# Patient Record
Sex: MALE | Race: WHITE | NOT HISPANIC OR LATINO | Employment: UNEMPLOYED | URBAN - METROPOLITAN AREA
[De-identification: names, ages, dates, MRNs, and addresses within clinical notes are randomized per-mention and may not be internally consistent; named-entity substitution may affect disease eponyms.]

---

## 2018-10-01 ENCOUNTER — HOSPITAL ENCOUNTER (EMERGENCY)
Facility: HOSPITAL | Age: 43
Discharge: HOME | End: 2018-10-01
Attending: FAMILY MEDICINE | Admitting: FAMILY MEDICINE
Payer: COMMERCIAL

## 2018-10-01 VITALS
RESPIRATION RATE: 20 BRPM | HEIGHT: 65 IN | HEART RATE: 93 BPM | TEMPERATURE: 96.9 F | WEIGHT: 140 LBS | DIASTOLIC BLOOD PRESSURE: 97 MMHG | OXYGEN SATURATION: 97 % | SYSTOLIC BLOOD PRESSURE: 143 MMHG | BODY MASS INDEX: 23.32 KG/M2

## 2018-10-01 DIAGNOSIS — E87.6 HYPOKALEMIA: ICD-10-CM

## 2018-10-01 DIAGNOSIS — R73.9 HYPERGLYCEMIA: Primary | ICD-10-CM

## 2018-10-01 LAB
ANION GAP SERPL CALCULATED.3IONS-SCNC: 7 MMOL/L (ref 4–13)
BASOPHILS # BLD AUTO: 0 THOUSANDS/ΜL (ref 0–0.1)
BASOPHILS NFR BLD AUTO: 1 % (ref 0–2)
BUN SERPL-MCNC: 11 MG/DL (ref 7–25)
CALCIUM SERPL-MCNC: 9.2 MG/DL (ref 8.6–10.5)
CHLORIDE SERPL-SCNC: 100 MMOL/L (ref 98–107)
CO2 SERPL-SCNC: 28 MMOL/L (ref 21–31)
CREAT SERPL-MCNC: 0.77 MG/DL (ref 0.7–1.3)
EOSINOPHIL # BLD AUTO: 0.1 THOUSAND/ΜL (ref 0–0.61)
EOSINOPHIL NFR BLD AUTO: 1 % (ref 0–5)
ERYTHROCYTE [DISTWIDTH] IN BLOOD BY AUTOMATED COUNT: 12.3 % (ref 11.5–14.5)
GFR SERPL CREATININE-BSD FRML MDRD: 112 ML/MIN/1.73SQ M
GLUCOSE SERPL-MCNC: 269 MG/DL (ref 65–140)
GLUCOSE SERPL-MCNC: 324 MG/DL (ref 65–140)
GLUCOSE SERPL-MCNC: 349 MG/DL (ref 65–99)
GLUCOSE SERPL-MCNC: >500 MG/DL (ref 65–140)
HCT VFR BLD AUTO: 45.1 % (ref 36.5–49.3)
HGB BLD-MCNC: 15.6 G/DL (ref 14–18)
LYMPHOCYTES # BLD AUTO: 2.6 THOUSANDS/ΜL (ref 0.6–4.47)
LYMPHOCYTES NFR BLD AUTO: 26 % (ref 21–51)
MCH RBC QN AUTO: 30.3 PG (ref 26–34)
MCHC RBC AUTO-ENTMCNC: 34.6 G/DL (ref 31–37)
MCV RBC AUTO: 87 FL (ref 81–99)
MONOCYTES # BLD AUTO: 1 THOUSAND/ΜL (ref 0.17–1.22)
MONOCYTES NFR BLD AUTO: 11 % (ref 2–12)
NEUTROPHILS # BLD AUTO: 6 THOUSANDS/ΜL (ref 1.4–6.5)
NEUTS SEG NFR BLD AUTO: 62 % (ref 42–75)
NRBC BLD AUTO-RTO: 0 /100 WBCS
PLATELET # BLD AUTO: 220 THOUSANDS/UL (ref 149–390)
PMV BLD AUTO: 8.6 FL (ref 8.6–11.7)
POTASSIUM SERPL-SCNC: 3.3 MMOL/L (ref 3.5–5.5)
RBC # BLD AUTO: 5.16 MILLION/UL (ref 4.3–5.9)
SODIUM SERPL-SCNC: 135 MMOL/L (ref 134–143)
WBC # BLD AUTO: 9.7 THOUSAND/UL (ref 4.8–10.8)

## 2018-10-01 PROCEDURE — 85025 COMPLETE CBC W/AUTO DIFF WBC: CPT | Performed by: FAMILY MEDICINE

## 2018-10-01 PROCEDURE — 96376 TX/PRO/DX INJ SAME DRUG ADON: CPT

## 2018-10-01 PROCEDURE — 36415 COLL VENOUS BLD VENIPUNCTURE: CPT | Performed by: FAMILY MEDICINE

## 2018-10-01 PROCEDURE — 99284 EMERGENCY DEPT VISIT MOD MDM: CPT

## 2018-10-01 PROCEDURE — 96374 THER/PROPH/DIAG INJ IV PUSH: CPT

## 2018-10-01 PROCEDURE — 80048 BASIC METABOLIC PNL TOTAL CA: CPT | Performed by: FAMILY MEDICINE

## 2018-10-01 PROCEDURE — 96361 HYDRATE IV INFUSION ADD-ON: CPT

## 2018-10-01 PROCEDURE — 82948 REAGENT STRIP/BLOOD GLUCOSE: CPT

## 2018-10-01 RX ORDER — GABAPENTIN 100 MG/1
100 CAPSULE ORAL 3 TIMES DAILY
COMMUNITY
End: 2021-03-09 | Stop reason: HOSPADM

## 2018-10-01 RX ORDER — POTASSIUM CHLORIDE 20 MEQ/1
40 TABLET, EXTENDED RELEASE ORAL ONCE
Status: COMPLETED | OUTPATIENT
Start: 2018-10-01 | End: 2018-10-01

## 2018-10-01 RX ADMIN — SODIUM CHLORIDE 1000 ML: 0.9 INJECTION, SOLUTION INTRAVENOUS at 13:18

## 2018-10-01 RX ADMIN — INSULIN HUMAN 5 UNITS: 100 INJECTION, SOLUTION PARENTERAL at 13:24

## 2018-10-01 RX ADMIN — INSULIN HUMAN 15 UNITS: 100 INJECTION, SOLUTION PARENTERAL at 12:03

## 2018-10-01 RX ADMIN — SODIUM CHLORIDE 1000 ML: 0.9 INJECTION, SOLUTION INTRAVENOUS at 12:03

## 2018-10-01 RX ADMIN — POTASSIUM CHLORIDE 40 MEQ: 1500 TABLET, EXTENDED RELEASE ORAL at 13:30

## 2018-10-01 NOTE — ED PROVIDER NOTES
History  Chief Complaint   Patient presents with    Dizziness     blood sugar >500       History provided by:  Patient and EMS personnel   used: No    Hyperglycemia - no symptoms   Severity:  Moderate  Onset quality:  Gradual  Duration:  1 week  Timing:  Constant  Progression:  Worsening  Chronicity:  Recurrent  Diabetes status:  Controlled with oral medications  Current diabetic therapy:  Metformin  Time since last antidiabetic medication:  2 hours  Context: not change in medication, not new diabetes diagnosis, not noncompliance, not recent change in diet and not recent illness    Relieved by:  Oral agents  Ineffective treatments:  Oral agents  Associated symptoms: dehydration and increased thirst    Associated symptoms: no abdominal pain, no blurred vision, no confusion, no dizziness, no fever, no increased appetite, no nausea, no vomiting and no weakness        Prior to Admission Medications   Prescriptions Last Dose Informant Patient Reported? Taking?   gabapentin (NEURONTIN) 100 mg capsule   Yes Yes   Sig: Take 100 mg by mouth 3 (three) times a day   metFORMIN (GLUCOPHAGE) 1000 MG tablet   Yes Yes   Sig: Take 1,000 mg by mouth 2 (two) times a day with meals      Facility-Administered Medications: None       Past Medical History:   Diagnosis Date    [facility]     type 2    Hypertension     Psychiatric disorder     depression       History reviewed  No pertinent surgical history  History reviewed  No pertinent family history  I have reviewed and agree with the history as documented  Social History   Substance Use Topics    Smoking status: Current Every Day Smoker     Packs/day: 0 50     Types: Cigarettes    Smokeless tobacco: Never Used    Alcohol use Yes      Comment: occasionally        Review of Systems   Constitutional: Negative for fever  HENT: Negative  Eyes: Negative for blurred vision  Respiratory: Negative      Gastrointestinal: Negative for abdominal pain, nausea and vomiting  Endocrine: Positive for polydipsia  Hyperglycemia   Genitourinary: Negative  Musculoskeletal: Negative  Neurological: Negative for dizziness and weakness  Psychiatric/Behavioral: Negative for confusion  Physical Exam  Physical Exam   Constitutional: He is oriented to person, place, and time  He appears well-developed and well-nourished  HENT:   Nose: Nose normal    Mouth/Throat: Oropharynx is clear and moist  No oropharyngeal exudate  Eyes: Pupils are equal, round, and reactive to light  Conjunctivae and EOM are normal  No scleral icterus  Neck: Normal range of motion  Neck supple  No JVD present  No tracheal deviation present  Cardiovascular: Normal rate, regular rhythm and normal heart sounds  No murmur heard  Pulmonary/Chest: Effort normal and breath sounds normal  No respiratory distress  He has no wheezes  He has no rales  Abdominal: Soft  Bowel sounds are normal  There is no tenderness  There is no guarding  Musculoskeletal: Normal range of motion  He exhibits no edema or tenderness  Neurological: He is alert and oriented to person, place, and time  No cranial nerve deficit or sensory deficit  He exhibits normal muscle tone  5/5 motor, nl sens   Skin: Skin is warm and dry  Psychiatric: He has a normal mood and affect  His behavior is normal    Nursing note and vitals reviewed        Vital Signs  ED Triage Vitals [10/01/18 1158]   Temperature Pulse Respirations Blood Pressure SpO2   98 °F (36 7 °C) (!) 113 20 147/97 97 %      Temp Source Heart Rate Source Patient Position - Orthostatic VS BP Location FiO2 (%)   Temporal Monitor Sitting Left arm --      Pain Score       No Pain           Vitals:    10/01/18 1158 10/01/18 1215   BP: 147/97    Pulse: (!) 113 102   Patient Position - Orthostatic VS: Sitting        Visual Acuity  Visual Acuity      Most Recent Value   L Pupil Size (mm)  3   R Pupil Size (mm)  3          ED Medications  Medications   sodium chloride 0 9 % bolus 1,000 mL (1,000 mL Intravenous New Bag 10/1/18 1318)   insulin regular (HumuLIN R,NovoLIN R) injection 15 Units (15 Units Intravenous Given 10/1/18 1203)   sodium chloride 0 9 % bolus 1,000 mL (0 mL Intravenous Stopped 10/1/18 1257)   insulin regular (HumuLIN R,NovoLIN R) injection 5 Units (5 Units Intravenous Given 10/1/18 1324)   potassium chloride (K-DUR,KLOR-CON) CR tablet 40 mEq (40 mEq Oral Given 10/1/18 1330)       Diagnostic Studies  Results Reviewed     Procedure Component Value Units Date/Time    Fingerstick Glucose (POCT) [[id number]]  (Abnormal) Collected:  10/01/18 1358    Lab Status:  Final result Updated:  10/01/18 1359     POC Glucose 269 (H) mg/dl     Basic metabolic panel [[id number]]  (Abnormal) Collected:  10/01/18 1253    Lab Status:  Final result Specimen:  Blood from Arm, Left Updated:  10/01/18 1317     Sodium 135 mmol/L      Potassium 3 3 (L) mmol/L      Chloride 100 mmol/L      CO2 28 mmol/L      ANION GAP 7 mmol/L      BUN 11 mg/dL      Creatinine 0 77 mg/dL      Glucose 349 (H) mg/dL      Calcium 9 2 mg/dL      eGFR 112 ml/min/1 73sq m     Narrative:       [address] [facility] Kidney Disease Education Program recommendations are as follows:  GFR calculation is accurate only with a steady state creatinine  Chronic Kidney disease less than 60 ml/min/1 73 sq  meters  Kidney failure less than 15 ml/min/1 73 sq  meters      CBC and differential [[id number]] Collected:  10/01/18 1253    Lab Status:  Final result Specimen:  Blood from Arm, Left Updated:  10/01/18 1316     WBC 9 70 Thousand/uL      RBC 5 16 Million/uL      Hemoglobin 15 6 g/dL      Hematocrit 45 1 %      MCV 87 fL      MCH 30 3 pg      MCHC 34 6 g/dL      RDW 12 3 %      MPV 8 6 fL      Platelets 119 Thousands/uL      nRBC 0 /100 WBCs      Neutrophils Relative 62 %      Lymphocytes Relative 26 %      Monocytes Relative 11 %      Eosinophils Relative 1 %      Basophils Relative 1 %      Neutrophils Absolute 6 00 Thousands/µL      Lymphocytes Absolute 2 60 Thousands/µL      Monocytes Absolute 1 00 Thousand/µL      Eosinophils Absolute 0 10 Thousand/µL      Basophils Absolute 0 00 Thousands/µL     Fingerstick Glucose (POCT) [[id number]]  (Abnormal) Collected:  10/01/18 1247    Lab Status:  Final result Updated:  10/01/18 1248     POC Glucose 324 (H) mg/dl     Fingerstick Glucose (POCT) [[id number]]  (Abnormal) Collected:  10/01/18 1152    Lab Status:  Final result Updated:  10/01/18 1153     POC Glucose >500 (HH) mg/dl                  No orders to display              Procedures  Procedures       Phone Contacts  ED Phone Contact    ED Course      patient advised to follow up with his PCP treat just his diabetic medication  Increase fluid intake and watch your diet and exercise  MDM  CritCare Time    Disposition  Final diagnoses:   Hyperglycemia   Hypokalemia     Time reflects when diagnosis was documented in both MDM as applicable and the Disposition within this note     Time User Action Codes Description Comment    10/1/2018  1:54 PM Jaiden Garcia [R73 9] Hyperglycemia     10/1/2018  1:54 PM Jaiden Garcia [E87 6] Hypokalemia       ED Disposition     ED Disposition Condition Comment    Discharge  Peter Arellano discharge to home/self care  Condition at discharge: Stable        Follow-up Information     Follow up With Specialties Details Why [address], DO Family Medicine In 2 days If symptoms worsen [address] [facility]            Patient's Medications   Discharge Prescriptions    No medications on file     No discharge procedures on file      ED Provider  Electronically Signed by           Elsy Jaime MD  10/01/18 8086

## 2018-10-01 NOTE — ED TRIAGE NOTES
Pt states that he has had dizziness, tingling in distal extremities, lightheadedness, Blood glucose level at home was 530, EMS was 549

## 2018-10-01 NOTE — DISCHARGE INSTRUCTIONS
Hypokalemia   WHAT YOU NEED TO KNOW:   Hypokalemia is a low level of potassium in your blood  Potassium helps control how your muscles, heart, and digestive system work  Hypokalemia occurs when your body loses too much potassium or does not absorb enough from food  DISCHARGE INSTRUCTIONS:   Seek care immediately if:   · You cannot move your arm or leg  · You have a fast or irregular heartbeat  · You are too tired or weak to stand up  Contact your healthcare provider if:   · You are vomiting, or you have diarrhea  · You have numbness or tingling in your arms or legs  · Your symptoms do not go away or they get worse  · You have questions or concerns about your condition or care  Medicines:   · Potassium  will be given to bring your potassium levels back to normal     · Take your medicine as directed  Contact your healthcare provider if you think your medicine is not helping or if you have side effects  Tell him of her if you are allergic to any medicine  Keep a list of the medicines, vitamins, and herbs you take  Include the amounts, and when and why you take them  Bring the list or the pill bottles to follow-up visits  Carry your medicine list with you in case of an emergency  Eat foods that are high in potassium:  Foods that are high in potassium include bananas, oranges, tomatoes, potatoes, and avocado  [place] beans, turkey, salmon, lean beef, yogurt, and milk are also high in potassium  Ask your healthcare provider or dietitian for more information about foods that are high in potassium  Follow up with your healthcare provider as directed:  Write down your questions so you remember to ask them during your visits  © 2017 [facility] Information is for End User's use only and may not be sold, redistributed or otherwise used for commercial purposes   All illustrations and images included in CareNotes® are the copyrighted property of A D A M , Inc  or [facility] [employer]  The above information is an  only  It is not intended as medical advice for individual conditions or treatments  Talk to your doctor, nurse or pharmacist before following any medical regimen to see if it is safe and effective for you  Hyperosmolar Hyperglycemic State   WHAT YOU NEED TO KNOW:   Hyperosmolar hyperglycemic state (HHS) is a serious medical condition that develops if you have diabetes and your blood sugar levels get very high  Your body gets rid of the extra sugar through your urine  This leads to severe dehydration  You can develop HHS at any age and whether you have type 1 or type 2 diabetes  DISCHARGE INSTRUCTIONS:   Medicines:   · Insulin:  You may need to take insulin if you have type 2 diabetes that cannot be controlled with diet, exercise, or other diabetes medicine  You may need 1 or more doses of insulin every day to decrease the amount of sugar in your blood  Ask your healthcare provider for more information about insulin  · Take your medicine as directed  Contact your healthcare provider if you think your medicine is not helping or if you have side effects  Tell him of her if you are allergic to any medicine  Keep a list of the medicines, vitamins, and herbs you take  Include the amounts, and when and why you take them  Bring the list or the pill bottles to follow-up visits  Carry your medicine list with you in case of an emergency  Follow up with your diabetes specialist or healthcare provider as directed:  Write down your questions so you remember to ask them during your visits  Prevent HHS:   · Check your blood sugar level regularly: You will need to check your blood sugar levels at least 3 times each day if you use an insulin pump or take multiple doses of insulin  Ask your healthcare provider for information on how to check your blood sugar level with a glucose meter   He will tell you what your target level should be and how often you should check            · Take your insulin or diabetes medicine: Take your insulin or diabetes medicines regularly and in the amount prescribed by your healthcare provider  This will help you to control your blood sugar levels  Tell your healthcare provider if the medicines are causing side effects or are not working well  Do not stop taking your insulin or medicines before talking to your healthcare provider  · Get help from others:  Older people are at increased risk of HHS  Have someone visit you regularly if you live alone  The visitor should watch for signs and symptoms of high blood sugar  The visitor should also remind you to drink enough liquids  It may be helpful to write down the amount of liquids you drink each day  · Prepare for sick days: Your blood sugar levels increase when you are sick and it can be hard to keep them under control  It is important to plan for sick days so that you can keep your blood sugar levels from getting too high  Talk to your healthcare provider about a sick day plan that will work best for you  Your healthcare provider may suggest some of the following:    ¨ Check your blood sugar more often than usual:  You may need to check your blood sugar level at least 4 times each day if you have type 2 diabetes  You may need to check even more often if you have type 1 diabetes  ¨ Check for ketones: You can check for ketones in your urine or blood at home  Ketone test kits are sold in pharmacies and some stores  Ask your healthcare provider which type of ketone testing is best for you  Your healthcare provider will tell you when and how often to check ketones  ¨ Take your insulin or diabetes medicine as directed: Take your insulin and diabetes medicine, even if you do not feel well and are eating less than usual  They help to keep your blood sugar under control   Talk to your healthcare provider before you make any changes to your dose of insulin or diabetes medicine  ¨ Continue your normal meal plan if you can:  Eat your regular meals and drink plenty of calorie-free drinks such as water and diet drinks  If you cannot continue your meal plan, eat other foods that are easier for your body to digest  These foods include apple sauce, gelatin, crackers, soup, pudding, and yogurt  If you cannot eat these foods, drink liquids with calories in them instead  Some liquids that have calories include juice, broth, and regular soft drinks  Medical alert identification:  Wear medical alert jewelry or carry a card that says you have diabetes  Ask your healthcare provider where you can get these items  For support and more information:   · American Diabetes Association  [address] , [address] [place] [address]  Phone: 7- 762 - 677-5045  Web Address: https://[employer]/  org   Contact your healthcare provider if:   · Your blood sugar levels are higher than you were told they should be  · You have blurred vision  · You are urinating more often than usual     · You are more thirsty than usual     · You have a fever  Return to the emergency department if:   · You are more drowsy than usual     · You begin to breathe fast or are short of breath  · You become weak and confused  · You have a seizure  © 2017 [address] [employer]  Information is for End User's use only and may not be sold, redistributed or otherwise used for commercial purposes  All illustrations and images included in CareNotes® are the copyrighted property of A D A M , Inc  or [employer]  The above information is an  only  It is not intended as medical advice for individual conditions or treatments  Talk to your doctor, nurse or pharmacist before following any medical regimen to see if it is safe and effective for you

## 2019-11-15 ENCOUNTER — OFFICE VISIT (OUTPATIENT)
Dept: URGENT CARE | Facility: CLINIC | Age: 44
End: 2019-11-15
Payer: COMMERCIAL

## 2019-11-15 VITALS
HEART RATE: 92 BPM | HEIGHT: 65 IN | RESPIRATION RATE: 18 BRPM | DIASTOLIC BLOOD PRESSURE: 83 MMHG | WEIGHT: 130 LBS | OXYGEN SATURATION: 99 % | BODY MASS INDEX: 21.66 KG/M2 | TEMPERATURE: 98.2 F | SYSTOLIC BLOOD PRESSURE: 153 MMHG

## 2019-11-15 DIAGNOSIS — L03.116 CELLULITIS OF LEFT HEEL: Primary | ICD-10-CM

## 2019-11-15 DIAGNOSIS — E11.42 DIABETIC POLYNEUROPATHY ASSOCIATED WITH TYPE 2 DIABETES MELLITUS (HCC): ICD-10-CM

## 2019-11-15 DIAGNOSIS — I89.1 ASCENDING LYMPHANGITIS: ICD-10-CM

## 2019-11-15 PROCEDURE — G0381 LEV 2 HOSP TYPE B ED VISIT: HCPCS | Performed by: PHYSICIAN ASSISTANT

## 2019-11-15 PROCEDURE — 99282 EMERGENCY DEPT VISIT SF MDM: CPT | Performed by: PHYSICIAN ASSISTANT

## 2019-11-15 PROCEDURE — 99202 OFFICE O/P NEW SF 15 MIN: CPT | Performed by: PHYSICIAN ASSISTANT

## 2019-11-15 RX ORDER — INSULIN GLARGINE 100 [IU]/ML
INJECTION, SOLUTION SUBCUTANEOUS
COMMUNITY
Start: 2019-10-31 | End: 2021-03-09 | Stop reason: HOSPADM

## 2019-11-15 RX ORDER — GABAPENTIN 600 MG/1
TABLET ORAL
COMMUNITY
Start: 2019-10-26 | End: 2021-03-09 | Stop reason: HOSPADM

## 2019-11-15 RX ORDER — BLOOD SUGAR DIAGNOSTIC
STRIP MISCELLANEOUS
COMMUNITY
Start: 2019-10-31 | End: 2021-03-09 | Stop reason: HOSPADM

## 2019-11-15 RX ORDER — ATORVASTATIN CALCIUM 20 MG/1
TABLET, FILM COATED ORAL
COMMUNITY
Start: 2019-10-26 | End: 2021-03-09 | Stop reason: HOSPADM

## 2019-11-15 RX ORDER — PEN NEEDLE, DIABETIC 31 GX5/16"
NEEDLE, DISPOSABLE MISCELLANEOUS
COMMUNITY
Start: 2019-10-22

## 2019-11-15 RX ORDER — BLOOD-GLUCOSE METER
EACH MISCELLANEOUS
COMMUNITY
Start: 2019-10-31

## 2019-11-15 RX ORDER — GLIMEPIRIDE 2 MG/1
TABLET ORAL
COMMUNITY
Start: 2019-10-26 | End: 2021-03-09 | Stop reason: HOSPADM

## 2019-11-15 RX ORDER — INSULIN DETEMIR 100 [IU]/ML
INJECTION, SOLUTION SUBCUTANEOUS
COMMUNITY
Start: 2019-10-21 | End: 2021-03-09 | Stop reason: HOSPADM

## 2019-11-15 RX ORDER — PEN NEEDLE, DIABETIC 31 GX5/16"
NEEDLE, DISPOSABLE MISCELLANEOUS
COMMUNITY
Start: 2019-10-23

## 2019-11-15 RX ORDER — CITALOPRAM 20 MG/1
TABLET ORAL
COMMUNITY
Start: 2019-10-26

## 2019-11-15 NOTE — PATIENT INSTRUCTIONS
Due to patient's presentation and poorly controlled diabetes recommend further evaluation at the emergency room    Patient choosing to go to [facility]

## 2019-11-15 NOTE — PROGRESS NOTES
[address]    NAME: Ti Yo is a 38 y o  male  : 1975    MRN: [id number]  DATE: November 15, 2019  TIME: 4:06 PM    Assessment and Plan   Cellulitis of left heel [L03 [id number]]  1  Cellulitis of left heel  Transfer to other facility   2  Ascending lymphangitis  Transfer to other facility   3  Diabetic polyneuropathy associated with [facility] ([facility]  Transfer to other facility       Patient Instructions   Patient Instructions   Due to patient's presentation and poorly controlled diabetes recommend further evaluation at the emergency room  Patient choosing to go to [facility]       Chief Complaint     Chief Complaint   Patient presents with   Beni Clayton     Started two nights ago with blister to left heel area  Since then, increased pain to area and now traveling up left leg all the way to groin area       History of Present Illness   Ti Yo presents to the clinic c/o  43-year-old male with pain, drainage left heel that he noted yesterday morning  Pain is now going up his leg into his groin area  He has a red streak on his leg  Denies any acute fever or chills  He has recently been started on insulin due to poorly controlled diabetes  Said his most recent A1c was over 14  He has diabetic neuropathy and cannot feel his feet very well  He stopped in at his primary care doctor's office today but they did not have an appointment and recommended he be seen at a walk-in clinic  Review of Systems   Review of Systems   Constitutional: Positive for activity change  Negative for appetite change, chills and fever  Respiratory: Negative  Cardiovascular: Negative  Musculoskeletal: Positive for joint swelling  Pain left heel radiating up leg   Skin: Positive for color change and wound         Current Medications     Long-Term Medications   Medication Sig Dispense Refill    atorvastatin (LIPITOR) 20 mg tablet       B-D UF III MINI PEN NEEDLES 31G X 5 MM MISC       B-D ULTRAFINE III SHORT PEN 31G X 8 MM MISC       BASAGLAR KWIKPEN 100 units/mL injection pen       citalopram (CeleXA) 20 mg tablet       gabapentin (NEURONTIN) 600 MG tablet       glimepiride (AMARYL) 2 mg tablet       LEVEMIR FLEXTOUCH 100 units/mL injection pen       metFORMIN (GLUCOPHAGE) 1000 MG tablet Take 1,000 mg by mouth 2 (two) times a day with meals      gabapentin (NEURONTIN) 100 mg capsule Take 100 mg by mouth 3 (three) times a day         Current Allergies     Allergies as of 11/15/2019 - Reviewed 11/15/2019   Allergen Reaction Noted    Penicillins            The following portions of the patient's history were reviewed and updated as appropriate: allergies, current medications, past family history, past medical history, past social history, past surgical history and problem list   Past Medical History:   Diagnosis Date    [facility] )     type 2    Hypertension     Psychiatric disorder     depression     History reviewed  No pertinent surgical history  Family History   Problem Relation Age of Onset    Diabetes Mother     Diabetes Maternal Grandmother        Objective   /83 (BP Location: Right arm, Patient Position: Sitting)   Pulse 92   Temp 98 2 °F (36 8 °C) (Tympanic)   Resp 18   Ht 5' 5" (1 651 m)   Wt 59 kg (130 lb)   SpO2 99%   BMI 21 63 kg/m²   No LMP for male patient  Physical Exam     Physical Exam   Constitutional: He is oriented to person, place, and time  He appears well-developed and well-nourished  No distress  Cardiovascular: Normal rate and regular rhythm  Pulmonary/Chest: Effort normal    Neurological: He is alert and oriented to person, place, and time  A sensory deficit is present  Skin: Skin is warm and dry  He is not diaphoretic  Ulceration posterior aspect left heel with surrounding redness  Trained due date noted  No obvious exudate  Redness swelling tenderness extending up leg into thigh     Psychiatric: He has a normal mood and affect  Nursing note and vitals reviewed

## 2019-12-27 ENCOUNTER — OFFICE VISIT (OUTPATIENT)
Dept: URGENT CARE | Facility: CLINIC | Age: 44
End: 2019-12-27
Payer: COMMERCIAL

## 2019-12-27 VITALS
SYSTOLIC BLOOD PRESSURE: 136 MMHG | HEART RATE: 100 BPM | RESPIRATION RATE: 18 BRPM | TEMPERATURE: 98.5 F | OXYGEN SATURATION: 98 % | DIASTOLIC BLOOD PRESSURE: 84 MMHG

## 2019-12-27 DIAGNOSIS — S61.310A LACERATION OF RIGHT INDEX FINGER WITHOUT FOREIGN BODY WITH DAMAGE TO NAIL, INITIAL ENCOUNTER: Primary | ICD-10-CM

## 2019-12-27 PROCEDURE — 99212 OFFICE O/P EST SF 10 MIN: CPT | Performed by: PHYSICIAN ASSISTANT

## 2019-12-27 PROCEDURE — 90715 TDAP VACCINE 7 YRS/> IM: CPT

## 2019-12-27 PROCEDURE — G0381 LEV 2 HOSP TYPE B ED VISIT: HCPCS | Performed by: PHYSICIAN ASSISTANT

## 2019-12-27 PROCEDURE — 99282 EMERGENCY DEPT VISIT SF MDM: CPT | Performed by: PHYSICIAN ASSISTANT

## 2019-12-27 PROCEDURE — 90471 IMMUNIZATION ADMIN: CPT | Performed by: PHYSICIAN ASSISTANT

## 2019-12-27 PROCEDURE — 12001 RPR S/N/AX/GEN/TRNK 2.5CM/<: CPT | Performed by: PHYSICIAN ASSISTANT

## 2019-12-27 RX ORDER — LIDOCAINE HYDROCHLORIDE 10 MG/ML
4 INJECTION, SOLUTION EPIDURAL; INFILTRATION; INTRACAUDAL; PERINEURAL ONCE
Status: DISCONTINUED | OUTPATIENT
Start: 2019-12-27 | End: 2021-03-09 | Stop reason: HOSPADM

## 2019-12-27 NOTE — PROGRESS NOTES
[facility]    NAME: Zachary Piper is a 38 y o  male  : 1975    MRN: [id number]  DATE: 2019  TIME: 6:32 PM    Assessment and Plan   Laceration of right index finger without foreign body with damage to nail, initial encounter [S61 310A]  1  Laceration of right index finger without foreign body with damage to nail, initial encounter  TDAP VACCINE GREATER THAN OR EQUAL TO 6YO IM    Laceration repair    Splint    lidocaine (PF) (XYLOCAINE-MPF) 1 % injection 4 mL     Nurse administered Tdap vaccine  Finger splint given to patient to use for protection as needed  It was not placed on the patient at the time of the visit  Nurse did apply bulky dressing to patient's right index finger  Patient Instructions     Patient Instructions   1  Keep initial dressing on and dry for approx  24 hours  If bleeds through, add additional dressing over initial dressing; elevate wound site if able and apply cold compress to lessen bleeding  2   After approx  24 hours may remove initial dressing  If dressing is stuck to wound, you may moisten dressing and gently work it off  Avoid ripping dressing off  3   Avoid submersing wound in water (tub, sink, pool, ocean, lake)  May shower and get area wet after 24 hours  4  Gently wash area with plain water and small amount of soap  Do not use peroxide for cleansing  After cleaning, pat dry and apply small amount of topical antibiotic ointment over wound site and clean dressing  5  Change dressing daily (also change dressing if it becomes soiled or wet )    6  May leave wound open to air after 48 hours if not longer oozing and when relaxing at home  May want to keep finger nail covered just due to help avoiding catching and snacking on anything that might cause further injury  7  If you develop any pain, swelling at wound site, you may try to elevate site if possble and use cold compresses over area    If concerned about possible infection (see below), seek further medical evaluation  8  Call your PCP today or tomorrow (or as soon as possible) to make appoint to have sutures removed in 9-10 days  9  Watch for signs of infection which would include increased pain at wound site, swelling, redness and purulent drainage  Seek further evaluation as soon as possible  Chief Complaint     Chief Complaint   Patient presents with    Finger Laceration     Laceration to right index finger happening approx 1300 today while removing nails from radiator  Last tetanus unknown  History of Present Illness   Shailesh Mcmillan presents to the clinic c/o  67-year-old right-hand-dominant male comes in with laceration distal aspect right index finger and fingernail  Was removing nails with small new crowbar and lacerated finger  This happened around 11:00 a m  this morning  Patient is diabetic  He does have some neuropathy in his feet  Possibly in his fingers  He is a smoker  Unknown last tetanus vaccine  Review of Systems   Review of Systems   Constitutional: Negative  Skin: Positive for wound         Current Medications     Long-Term Medications   Medication Sig Dispense Refill    atorvastatin (LIPITOR) 20 mg tablet       BASAGLAR KWIKPEN 100 units/mL injection pen       citalopram (CeleXA) 20 mg tablet       gabapentin (NEURONTIN) 100 mg capsule Take 100 mg by mouth 3 (three) times a day      glimepiride (AMARYL) 2 mg tablet       metFORMIN (GLUCOPHAGE) 1000 MG tablet Take 1,000 mg by mouth 2 (two) times a day with meals      B-D UF III MINI PEN NEEDLES 31G X 5 MM MISC       B-D ULTRAFINE III SHORT PEN 31G X 8 MM MISC       gabapentin (NEURONTIN) 600 MG tablet       LEVEMIR FLEXTOUCH 100 units/mL injection pen          Current Allergies     Allergies as of 12/27/2019 - Reviewed 12/27/2019   Allergen Reaction Noted    Penicillins            The following portions of the patient's history were reviewed and updated as appropriate: allergies, current medications, past family history, past medical history, past social history, past surgical history and problem list   Past Medical History:   Diagnosis Date    Diabetes mellitus ([facility] [address] )     type 2    Hypertension     Psychiatric disorder     depression     History reviewed  No pertinent surgical history  Family History   Problem Relation Age of Onset    Diabetes Mother     Diabetes Maternal Grandmother        Objective   /84 (BP Location: Left arm, Patient Position: Sitting)   Pulse 100   Temp 98 5 °F (36 9 °C) (Tympanic Core)   Resp 18   SpO2 98%   No LMP for male patient  Physical Exam     Physical Exam   Constitutional: He is oriented to person, place, and time  He appears well-developed and well-nourished  No distress  Cardiovascular: Normal rate  Pulmonary/Chest: Effort normal    Neurological: He is alert and oriented to person, place, and time  Skin: He is not diaphoretic  Wound distal aspect right index finger over tip and slightly involving distal aspect finger nail  Linear laceration total length 1 cm   Psychiatric: He has a normal mood and affect  Nursing note and vitals reviewed  Laceration repair  Date/Time: 12/27/2019 6:21 PM  Performed by: Natalia Shirley PA-C  Authorized by: Natalia Shirley PA-C   Consent: Verbal consent obtained    Consent given by: patient  Patient understanding: patient states understanding of the procedure being performed  Patient consent: the patient's understanding of the procedure matches consent given  Patient identity confirmed: verbally with patient  Body area: upper extremity  Location details: right index finger  Laceration length: 1 cm  Tendon involvement: none  Nerve involvement: none  Vascular damage: no  Anesthesia: digital block    Anesthesia:  Local Anesthetic: lidocaine 1% without epinephrine  Anesthetic total: 4 mL    Sedation:  Patient sedated: no        Procedure Details:  Preparation: Patient was prepped and draped in the usual sterile fashion    Irrigation solution: saline  Amount of cleaning: standard  Debridement: none  Degree of undermining: none  Skin closure: 4-0 nylon, Ethilon and glue  Number of sutures: 3  Technique: simple  Approximation: close  Dressing: antibiotic ointment, pressure dressing and gauze roll  Patient tolerance: Patient tolerated the procedure well with no immediate complications  Comments: Finger nail glued

## 2019-12-27 NOTE — PATIENT INSTRUCTIONS
1  Keep initial dressing on and dry for approx  24 hours  If bleeds through, add additional dressing over initial dressing; elevate wound site if able and apply cold compress to lessen bleeding  2   After approx  24 hours may remove initial dressing  If dressing is stuck to wound, you may moisten dressing and gently work it off  Avoid ripping dressing off  3   Avoid submersing wound in water (tub, sink, pool, ocean, lake)  May shower and get area wet after 24 hours  4  Gently wash area with plain water and small amount of soap  Do not use peroxide for cleansing  After cleaning, pat dry and apply small amount of topical antibiotic ointment over wound site and clean dressing  5  Change dressing daily (also change dressing if it becomes soiled or wet )    6  May leave wound open to air after 48 hours if not longer oozing and when relaxing at home  May want to keep finger nail covered just due to help avoiding catching and snacking on anything that might cause further injury  7  If you develop any pain, swelling at wound site, you may try to elevate site if possble and use cold compresses over area  If concerned about possible infection (see below), seek further medical evaluation  8  Call your PCP today or tomorrow (or as soon as possible) to make appoint to have sutures removed in 9-10 days  9  Watch for signs of infection which would include increased pain at wound site, swelling, redness and purulent drainage  Seek further evaluation as soon as possible

## 2021-02-28 ENCOUNTER — APPOINTMENT (EMERGENCY)
Dept: RADIOLOGY | Facility: HOSPITAL | Age: 46
DRG: 710 | End: 2021-02-28
Payer: COMMERCIAL

## 2021-02-28 ENCOUNTER — HOSPITAL ENCOUNTER (INPATIENT)
Facility: HOSPITAL | Age: 46
LOS: 9 days | Discharge: HOME | DRG: 710 | End: 2021-03-09
Attending: EMERGENCY MEDICINE | Admitting: INTERNAL MEDICINE
Payer: COMMERCIAL

## 2021-02-28 DIAGNOSIS — N15.1 RENAL ABSCESS: ICD-10-CM

## 2021-02-28 DIAGNOSIS — E11.65 TYPE 2 DIABETES MELLITUS WITH HYPERGLYCEMIA, WITH LONG-TERM CURRENT USE OF INSULIN (HCC): ICD-10-CM

## 2021-02-28 DIAGNOSIS — L02.212 ABSCESS OF SCAPULAR REGION: ICD-10-CM

## 2021-02-28 DIAGNOSIS — A41.9 SEPSIS (HCC): ICD-10-CM

## 2021-02-28 DIAGNOSIS — N12 PYELONEPHRITIS: ICD-10-CM

## 2021-02-28 DIAGNOSIS — E11.10 DKA (DIABETIC KETOACIDOSES): ICD-10-CM

## 2021-02-28 DIAGNOSIS — A49.01 MSSA (METHICILLIN SUSCEPTIBLE STAPHYLOCOCCUS AUREUS) INFECTION: ICD-10-CM

## 2021-02-28 DIAGNOSIS — M25.511 ACUTE PAIN OF RIGHT SHOULDER: ICD-10-CM

## 2021-02-28 DIAGNOSIS — N41.2 PROSTATE ABSCESS: ICD-10-CM

## 2021-02-28 DIAGNOSIS — Z72.0 TOBACCO ABUSE: ICD-10-CM

## 2021-02-28 DIAGNOSIS — N41.2 ABSCESS OF PROSTATE: Primary | ICD-10-CM

## 2021-02-28 DIAGNOSIS — Z79.4 TYPE 2 DIABETES MELLITUS WITH HYPERGLYCEMIA, WITH LONG-TERM CURRENT USE OF INSULIN (HCC): ICD-10-CM

## 2021-02-28 PROBLEM — E87.1 HYPONATREMIA: Status: ACTIVE | Noted: 2021-02-28

## 2021-02-28 LAB
ALBUMIN SERPL BCP-MCNC: 2.7 G/DL (ref 3.5–5)
ALP SERPL-CCNC: 148 U/L (ref 46–116)
ALT SERPL W P-5'-P-CCNC: 9 U/L (ref 12–78)
ANION GAP SERPL CALCULATED.3IONS-SCNC: 7 MMOL/L (ref 4–13)
ANION GAP SERPL CALCULATED.3IONS-SCNC: 8 MMOL/L (ref 4–13)
APTT PPP: 32 SECONDS (ref 23–37)
AST SERPL W P-5'-P-CCNC: 5 U/L (ref 5–45)
ATRIAL RATE: 118 BPM
BACTERIA UR QL AUTO: ABNORMAL /HPF
BASOPHILS # BLD MANUAL: 0 THOUSAND/UL (ref 0–0.1)
BASOPHILS NFR MAR MANUAL: 0 % (ref 0–1)
BETA-HYDROXYBUTYRATE: 1.7 MMOL/L
BILIRUB SERPL-MCNC: 0.68 MG/DL (ref 0.2–1)
BILIRUB UR QL STRIP: NEGATIVE
BUN SERPL-MCNC: 17 MG/DL (ref 5–25)
BUN SERPL-MCNC: 19 MG/DL (ref 5–25)
CALCIUM ALBUM COR SERPL-MCNC: 10.6 MG/DL (ref 8.3–10.1)
CALCIUM SERPL-MCNC: 8.4 MG/DL (ref 8.3–10.1)
CALCIUM SERPL-MCNC: 9.6 MG/DL (ref 8.3–10.1)
CHLORIDE SERPL-SCNC: 85 MMOL/L (ref 100–108)
CHLORIDE SERPL-SCNC: 90 MMOL/L (ref 100–108)
CLARITY UR: CLEAR
CO2 SERPL-SCNC: 29 MMOL/L (ref 21–32)
CO2 SERPL-SCNC: 32 MMOL/L (ref 21–32)
COLOR UR: YELLOW
CREAT SERPL-MCNC: 0.68 MG/DL (ref 0.6–1.3)
CREAT SERPL-MCNC: 0.86 MG/DL (ref 0.6–1.3)
EOSINOPHIL # BLD MANUAL: 0.54 THOUSAND/UL (ref 0–0.4)
EOSINOPHIL NFR BLD MANUAL: 2 % (ref 0–6)
ERYTHROCYTE [DISTWIDTH] IN BLOOD BY AUTOMATED COUNT: 11.5 % (ref 11.6–15.1)
FLUAV RNA RESP QL NAA+PROBE: NEGATIVE
FLUBV RNA RESP QL NAA+PROBE: NEGATIVE
GFR SERPL CREATININE-BSD FRML MDRD: 105 ML/MIN/1.73SQ M
GFR SERPL CREATININE-BSD FRML MDRD: 115 ML/MIN/1.73SQ M
GLUCOSE SERPL-MCNC: 294 MG/DL (ref 65–140)
GLUCOSE SERPL-MCNC: 319 MG/DL (ref 65–140)
GLUCOSE SERPL-MCNC: 320 MG/DL (ref 65–140)
GLUCOSE SERPL-MCNC: 334 MG/DL (ref 65–140)
GLUCOSE SERPL-MCNC: 376 MG/DL (ref 65–140)
GLUCOSE SERPL-MCNC: 547 MG/DL (ref 65–140)
GLUCOSE SERPL-MCNC: >500 MG/DL (ref 65–140)
GLUCOSE UR STRIP-MCNC: ABNORMAL MG/DL
HCT VFR BLD AUTO: 41.7 % (ref 36.5–49.3)
HGB BLD-MCNC: 14.5 G/DL (ref 12–17)
HGB UR QL STRIP.AUTO: ABNORMAL
INR PPP: 1.02 (ref 0.84–1.19)
KETONES UR STRIP-MCNC: ABNORMAL MG/DL
LACTATE SERPL-SCNC: 1.5 MMOL/L (ref 0.5–2)
LEUKOCYTE ESTERASE UR QL STRIP: ABNORMAL
LYMPHOCYTES # BLD AUTO: 0.54 THOUSAND/UL (ref 0.6–4.47)
LYMPHOCYTES # BLD AUTO: 2 % (ref 14–44)
MCH RBC QN AUTO: 28.9 PG (ref 26.8–34.3)
MCHC RBC AUTO-ENTMCNC: 34.8 G/DL (ref 31.4–37.4)
MCV RBC AUTO: 83 FL (ref 82–98)
MONOCYTES # BLD AUTO: 2.16 THOUSAND/UL (ref 0–1.22)
MONOCYTES NFR BLD: 8 % (ref 4–12)
NEUTROPHILS # BLD MANUAL: 22.14 THOUSAND/UL (ref 1.85–7.62)
NEUTS BAND NFR BLD MANUAL: 3 % (ref 0–8)
NEUTS SEG NFR BLD AUTO: 79 % (ref 43–75)
NITRITE UR QL STRIP: POSITIVE
NON-SQ EPI CELLS URNS QL MICRO: ABNORMAL /HPF
NRBC BLD AUTO-RTO: 0 /100 WBCS
P AXIS: 69 DEGREES
PH UR STRIP.AUTO: 6 [PH] (ref 4.5–8)
PLATELET # BLD AUTO: 562 THOUSANDS/UL (ref 149–390)
PLATELET BLD QL SMEAR: ABNORMAL
PMV BLD AUTO: 9 FL (ref 8.9–12.7)
POTASSIUM SERPL-SCNC: 3.9 MMOL/L (ref 3.5–5.3)
POTASSIUM SERPL-SCNC: 4.2 MMOL/L (ref 3.5–5.3)
PR INTERVAL: 138 MS
PROCALCITONIN SERPL-MCNC: 0.35 NG/ML
PROT SERPL-MCNC: 7.6 G/DL (ref 6.4–8.2)
PROT UR STRIP-MCNC: NEGATIVE MG/DL
PROTHROMBIN TIME: 13.4 SECONDS (ref 11.6–14.5)
QRS AXIS: 96 DEGREES
QRSD INTERVAL: 86 MS
QT INTERVAL: 302 MS
QTC INTERVAL: 423 MS
RBC # BLD AUTO: 5.01 MILLION/UL (ref 3.88–5.62)
RBC #/AREA URNS AUTO: ABNORMAL /HPF
RBC MORPH BLD: NORMAL
RSV RNA RESP QL NAA+PROBE: NEGATIVE
SARS-COV-2 RNA RESP QL NAA+PROBE: NEGATIVE
SODIUM SERPL-SCNC: 124 MMOL/L (ref 136–145)
SODIUM SERPL-SCNC: 127 MMOL/L (ref 136–145)
SP GR UR STRIP.AUTO: <=1.005 (ref 1–1.03)
T WAVE AXIS: 39 DEGREES
UROBILINOGEN UR QL STRIP.AUTO: 0.2 E.U./DL
VARIANT LYMPHS # BLD AUTO: 6 %
VENTRICULAR RATE: 118 BPM
WBC # BLD AUTO: 27 THOUSAND/UL (ref 4.31–10.16)
WBC #/AREA URNS AUTO: ABNORMAL /HPF
WBC CLUMPS # UR AUTO: ABNORMAL /UL

## 2021-02-28 PROCEDURE — 99223 1ST HOSP IP/OBS HIGH 75: CPT | Performed by: INTERNAL MEDICINE

## 2021-02-28 PROCEDURE — 85610 PROTHROMBIN TIME: CPT | Performed by: EMERGENCY MEDICINE

## 2021-02-28 PROCEDURE — 87040 BLOOD CULTURE FOR BACTERIA: CPT | Performed by: EMERGENCY MEDICINE

## 2021-02-28 PROCEDURE — 74177 CT ABD & PELVIS W/CONTRAST: CPT

## 2021-02-28 PROCEDURE — 93005 ELECTROCARDIOGRAM TRACING: CPT

## 2021-02-28 PROCEDURE — 87186 SC STD MICRODIL/AGAR DIL: CPT

## 2021-02-28 PROCEDURE — 36415 COLL VENOUS BLD VENIPUNCTURE: CPT | Performed by: EMERGENCY MEDICINE

## 2021-02-28 PROCEDURE — 85027 COMPLETE CBC AUTOMATED: CPT | Performed by: EMERGENCY MEDICINE

## 2021-02-28 PROCEDURE — 96367 TX/PROPH/DG ADDL SEQ IV INF: CPT

## 2021-02-28 PROCEDURE — 71045 X-RAY EXAM CHEST 1 VIEW: CPT

## 2021-02-28 PROCEDURE — 82010 KETONE BODYS QUAN: CPT | Performed by: EMERGENCY MEDICINE

## 2021-02-28 PROCEDURE — 99291 CRITICAL CARE FIRST HOUR: CPT | Performed by: EMERGENCY MEDICINE

## 2021-02-28 PROCEDURE — 84145 PROCALCITONIN (PCT): CPT | Performed by: EMERGENCY MEDICINE

## 2021-02-28 PROCEDURE — 99285 EMERGENCY DEPT VISIT HI MDM: CPT

## 2021-02-28 PROCEDURE — 81001 URINALYSIS AUTO W/SCOPE: CPT

## 2021-02-28 PROCEDURE — G1004 CDSM NDSC: HCPCS

## 2021-02-28 PROCEDURE — 96376 TX/PRO/DX INJ SAME DRUG ADON: CPT

## 2021-02-28 PROCEDURE — 80053 COMPREHEN METABOLIC PANEL: CPT | Performed by: EMERGENCY MEDICINE

## 2021-02-28 PROCEDURE — 85730 THROMBOPLASTIN TIME PARTIAL: CPT | Performed by: EMERGENCY MEDICINE

## 2021-02-28 PROCEDURE — 0241U HB NFCT DS VIR RESP RNA 4 TRGT: CPT | Performed by: EMERGENCY MEDICINE

## 2021-02-28 PROCEDURE — 93010 ELECTROCARDIOGRAM REPORT: CPT | Performed by: INTERNAL MEDICINE

## 2021-02-28 PROCEDURE — 82948 REAGENT STRIP/BLOOD GLUCOSE: CPT

## 2021-02-28 PROCEDURE — 87077 CULTURE AEROBIC IDENTIFY: CPT

## 2021-02-28 PROCEDURE — 87086 URINE CULTURE/COLONY COUNT: CPT

## 2021-02-28 PROCEDURE — 83605 ASSAY OF LACTIC ACID: CPT | Performed by: EMERGENCY MEDICINE

## 2021-02-28 PROCEDURE — 85007 BL SMEAR W/DIFF WBC COUNT: CPT | Performed by: EMERGENCY MEDICINE

## 2021-02-28 PROCEDURE — 99254 IP/OBS CNSLTJ NEW/EST MOD 60: CPT | Performed by: UROLOGY

## 2021-02-28 PROCEDURE — 80048 BASIC METABOLIC PNL TOTAL CA: CPT | Performed by: EMERGENCY MEDICINE

## 2021-02-28 PROCEDURE — 96365 THER/PROPH/DIAG IV INF INIT: CPT

## 2021-02-28 RX ORDER — HEPARIN SODIUM 5000 [USP'U]/ML
5000 INJECTION, SOLUTION INTRAVENOUS; SUBCUTANEOUS EVERY 8 HOURS SCHEDULED
Status: DISCONTINUED | OUTPATIENT
Start: 2021-02-28 | End: 2021-03-09 | Stop reason: HOSPADM

## 2021-02-28 RX ORDER — SODIUM CHLORIDE, SODIUM GLUCONATE, SODIUM ACETATE, POTASSIUM CHLORIDE, MAGNESIUM CHLORIDE, SODIUM PHOSPHATE, DIBASIC, AND POTASSIUM PHOSPHATE .53; .5; .37; .037; .03; .012; .00082 G/100ML; G/100ML; G/100ML; G/100ML; G/100ML; G/100ML; G/100ML
1000 INJECTION, SOLUTION INTRAVENOUS ONCE
Status: COMPLETED | OUTPATIENT
Start: 2021-02-28 | End: 2021-02-28

## 2021-02-28 RX ORDER — SODIUM CHLORIDE, SODIUM GLUCONATE, SODIUM ACETATE, POTASSIUM CHLORIDE, MAGNESIUM CHLORIDE, SODIUM PHOSPHATE, DIBASIC, AND POTASSIUM PHOSPHATE .53; .5; .37; .037; .03; .012; .00082 G/100ML; G/100ML; G/100ML; G/100ML; G/100ML; G/100ML; G/100ML
500 INJECTION, SOLUTION INTRAVENOUS ONCE
Status: DISCONTINUED | OUTPATIENT
Start: 2021-02-28 | End: 2021-02-28

## 2021-02-28 RX ORDER — HYDROMORPHONE HCL/PF 1 MG/ML
1 SYRINGE (ML) INJECTION EVERY 4 HOURS PRN
Status: DISCONTINUED | OUTPATIENT
Start: 2021-02-28 | End: 2021-03-04

## 2021-02-28 RX ORDER — ACETAMINOPHEN 325 MG/1
975 TABLET ORAL EVERY 8 HOURS SCHEDULED
Status: DISCONTINUED | OUTPATIENT
Start: 2021-02-28 | End: 2021-03-09 | Stop reason: HOSPADM

## 2021-02-28 RX ORDER — VANCOMYCIN HYDROCHLORIDE 1 G/200ML
20 INJECTION, SOLUTION INTRAVENOUS ONCE
Status: COMPLETED | OUTPATIENT
Start: 2021-02-28 | End: 2021-02-28

## 2021-02-28 RX ORDER — OXYCODONE HYDROCHLORIDE 5 MG/1
5 TABLET ORAL EVERY 4 HOURS PRN
Status: DISCONTINUED | OUTPATIENT
Start: 2021-02-28 | End: 2021-03-04

## 2021-02-28 RX ORDER — ACETAMINOPHEN 325 MG/1
975 TABLET ORAL ONCE
Status: COMPLETED | OUTPATIENT
Start: 2021-02-28 | End: 2021-02-28

## 2021-02-28 RX ORDER — DEXTROSE AND SODIUM CHLORIDE 5; .9 G/100ML; G/100ML
250 INJECTION, SOLUTION INTRAVENOUS CONTINUOUS
Status: DISCONTINUED | OUTPATIENT
Start: 2021-02-28 | End: 2021-02-28

## 2021-02-28 RX ORDER — POTASSIUM CHLORIDE 20 MEQ/1
40 TABLET, EXTENDED RELEASE ORAL ONCE
Status: COMPLETED | OUTPATIENT
Start: 2021-02-28 | End: 2021-02-28

## 2021-02-28 RX ORDER — SODIUM CHLORIDE 9 MG/ML
125 INJECTION, SOLUTION INTRAVENOUS CONTINUOUS
Status: DISCONTINUED | OUTPATIENT
Start: 2021-02-28 | End: 2021-03-04

## 2021-02-28 RX ADMIN — SODIUM CHLORIDE, SODIUM GLUCONATE, SODIUM ACETATE, POTASSIUM CHLORIDE, MAGNESIUM CHLORIDE, SODIUM PHOSPHATE, DIBASIC, AND POTASSIUM PHOSPHATE 1000 ML: .53; .5; .37; .037; .03; .012; .00082 INJECTION, SOLUTION INTRAVENOUS at 16:09

## 2021-02-28 RX ADMIN — POTASSIUM CHLORIDE 40 MEQ: 1500 TABLET, EXTENDED RELEASE ORAL at 13:50

## 2021-02-28 RX ADMIN — INSULIN HUMAN 10 UNITS: 100 INJECTION, SOLUTION PARENTERAL at 13:51

## 2021-02-28 RX ADMIN — SODIUM CHLORIDE, SODIUM GLUCONATE, SODIUM ACETATE, POTASSIUM CHLORIDE, MAGNESIUM CHLORIDE, SODIUM PHOSPHATE, DIBASIC, AND POTASSIUM PHOSPHATE 1000 ML: .53; .5; .37; .037; .03; .012; .00082 INJECTION, SOLUTION INTRAVENOUS at 12:40

## 2021-02-28 RX ADMIN — SODIUM CHLORIDE 5.2 UNITS/HR: 9 INJECTION, SOLUTION INTRAVENOUS at 14:16

## 2021-02-28 RX ADMIN — SODIUM CHLORIDE 125 ML/HR: 0.9 INJECTION, SOLUTION INTRAVENOUS at 18:21

## 2021-02-28 RX ADMIN — IOHEXOL 100 ML: 350 INJECTION, SOLUTION INTRAVENOUS at 14:06

## 2021-02-28 RX ADMIN — HEPARIN SODIUM 5000 UNITS: 5000 INJECTION INTRAVENOUS; SUBCUTANEOUS at 22:36

## 2021-02-28 RX ADMIN — OXYCODONE HYDROCHLORIDE 5 MG: 5 TABLET ORAL at 20:30

## 2021-02-28 RX ADMIN — ACETAMINOPHEN 975 MG: 325 TABLET ORAL at 22:36

## 2021-02-28 RX ADMIN — CEFTRIAXONE SODIUM 2000 MG: 10 INJECTION, POWDER, FOR SOLUTION INTRAVENOUS at 13:51

## 2021-02-28 RX ADMIN — SODIUM CHLORIDE 8 UNITS/HR: 9 INJECTION, SOLUTION INTRAVENOUS at 20:21

## 2021-02-28 RX ADMIN — VANCOMYCIN HYDROCHLORIDE 1000 MG: 1 INJECTION, SOLUTION INTRAVENOUS at 16:28

## 2021-02-28 RX ADMIN — ACETAMINOPHEN 975 MG: 325 TABLET, FILM COATED ORAL at 12:40

## 2021-02-28 RX ADMIN — OXYCODONE HYDROCHLORIDE 5 MG: 5 TABLET ORAL at 16:27

## 2021-02-28 NOTE — SEPSIS NOTE
Sepsis Note   Sarah Kruger 40 y o  male MRN: [id number]  Unit/Bed#: ED 02 Encounter: [id number]      qSOFA     Row Name 02/28/21 1500 02/28/21 1415 02/28/21 1300 02/28/21 1245 02/28/21 1205    Altered mental status GCS < 15  --  --  --  --  0    Respiratory Rate > / =22  1  0  0  1  --    Systolic BP < / =137  0  --  0  0  --    Q Sofa Score  1  0  0  1  0    Row Name 02/28/21 1150                Altered mental status GCS < 15  --        Respiratory Rate > / =47  0        Systolic BP < / =629  0        Q Sofa Score  0            Initial Sepsis Screening     Row Name 02/28/21 1200                Is the patient's history suggestive of a new or worsening infection? (!) Yes (Proceed)  -MARY        Suspected source of infection  urinary tract infection  -MARY        Are two or more of the following signs & symptoms of infection both present and new to the patient? (!) Yes (Proceed)  -MARY        Indicate SIRS criteria  Hyperthemia > 38 3C (100 9F); Tachycardia > 90 bpm  -MARY        If the answer is yes to both questions, suspicion of sepsis is present  --        If severe sepsis is present AND tissue hypoperfusion perists in the hour after fluid resuscitation or lactate > 4, the patient meets criteria for SEPTIC SHOCK  --        Are any of the following organ dysfunction criteria present within 6 hours of suspected infection and SIRS criteria that are NOT considered to be chronic conditions?   No  -MARY        Organ dysfunction  --        Date of presentation of severe sepsis  --        Time of presentation of severe sepsis  --        Tissue hypoperfusion persists in the hour after crystalloid fluid administration, evidenced, by either:  --        Was hypotension present within one hour of the conclusion of crystalloid fluid administration?  --        Date of presentation of septic shock  --        Time of presentation of septic shock  --          User Key  (r) = Recorded By, (t) = Taken By, (c) = Cosigned By    Initials Name Provider Type    Mary Davis MD Resident               Default Flowsheet Data (last 720 hours)      Sepsis Reassess     Row Name 02/28/21 1142                   Repeat Volume Status and Tissue Perfusion Assessment Performed    Repeat Volume Status and Tissue Perfusion Assessment Performed  Yes  -MARY           Volume Status and Tissue Perfusion Post Fluid Resuscitation * Must Document All *    Vital Signs Reviewed (HR, RR, BP, T)  --        Shock Index Reviewed  --        Arterial Oxygen Saturation Reviewed (POx, SaO2 or SpO2)  --        Cardio  --        Pulmonary  --        Capillary Refill  --        Peripheral Pulses  --        Skin  --        Urine output assessed  --           *OR*   Intensive Monitoring- Must Document One of the Following Four *:    Vital Signs Reviewed  Yes  -MARY        * Central Venous Pressure (CVP or RAP)  --        * Central Venous Oxygen (SVO2, ScvO2 or Oxygen saturation via central catheter)  --        * Bedside Cardiovascular US in IVC diameter and % collapse  --        * Passive Leg Raise OR Crystalloid Challenge  Passive leg exam  -MARY        Passive leg exam  Negative  -MARY        Crystalloid fluid challenge completed  --          User Key  (r) = Recorded By, (t) = Taken By, (c) = Cosigned By    [address] Name Provider Type    Mary Davis MD Resident

## 2021-02-28 NOTE — ASSESSMENT & PLAN NOTE
Secondary to above and decreasing oral intake  Corrected sodium for hyperglycemia is 134 7  Continue with IV fluid for hydration  Continue to monitor

## 2021-02-28 NOTE — ASSESSMENT & PLAN NOTE
POA, secondary to above  Patient presented with fever, tachycardia, leukocytosis  Continue with IV antibiotic and IV fluid  Continue with above treatment  Monitor

## 2021-02-28 NOTE — H&P
H&P- Maria Antonia Santiago 1975, 40 y o  male MRN: [id number]    Unit/Bed#: ED 02 Encounter: [id number]    Primary Care Provider: Brittnee Stapleton DO   Date and time admitted to hospital: 2/28/2021 11:44 AM        * Pyelonephritis  Assessment & Plan  Patient with underlying poorly-controlled diabetes, poor insulin compliance, presented with bilateral back pain, fatigue, dysuria, and hematuria    CT scan with Bilateral pyelonephritis with suspected superinfected infected cyst exophytic from the left lower pole measuring 4 9 x 2 5 cm  Small enhancing abscesses in both lobes of the prostate gland with adjacent cystitis, findings consistent with complicated prostatitis with secondary ascending infection      Abnormal urinalysis  Start broad-spectrum IV antibiotic  Urology is planning for cystoscopy tomorrow  Follow on urine and blood cultures  Pain control    Prostate abscess  Assessment & Plan  Management as above    Sepsis ([facility] [address] )  Assessment & Plan  POA, secondary to above  Patient presented with fever, tachycardia, leukocytosis  Continue with IV antibiotic and IV fluid  Continue with above treatment  Monitor    Hyponatremia  Assessment & Plan  Secondary to above and decreasing oral intake  Corrected sodium for hyperglycemia is 134 7  Continue with IV fluid for hydration  Continue to monitor    Tobacco abuse  Assessment & Plan  Patient refusing nicotine patch    Type 2 diabetes mellitus with hyperglycemia, with long-term current use of insulin ([facility])  Assessment & Plan  No results found for: HGBA1C    Recent Labs     02/28/21  1150   POCGLU >500*       Blood Sugar Average: Last 72 hrs:     Poorly-controlled  Noncompliance  Continue with insulin drip for now  Check A1c  Consult endocrine for further management    VTE Prophylaxis: Heparin  / sequential compression device   Code Status: full code  POLST: There is no POLST form on file for this patient (pre-hospital)      Anticipated Length of Stay:  Patient will be admitted on an Inpatient basis with an anticipated length of stay of  >2 midnights  Justification for Hospital Stay:  Above    Total Time for Visit, including Counseling / Coordination of Care: 1 hour  Greater than 50% of this total time spent on direct patient counseling and coordination of care  Chief Complaint:     Back pain    History of Present Illness    Ba Simon is a 40 y o  male who presents with bilateral back pain and abdominal pain for the past couple of weeks  Patient reports dysuria, urinary frequency weakness,  fatigue and hematuria with clot  He also reported fever last night  No nausea vomiting or diarrhea  Poor oral intake    Patient with underlying history of diabetes on insulin, with poor control and poor compliance with insulin    He was evaluated in the emergency room he found to be febrile with tachycardia and significant leukocytosis and hyperglycemia with blood sugar 547  Hyponatremia  Abdominal CT scan with bilateral pyelonephritis and prostate abscess  Abnormal urinalysis  Urology is planning for cystoscopy tomorrow    Review of Systems:    Review of Systems   Constitutional: Positive for appetite change, fatigue and fever  Negative for chills  HENT: Negative for sore throat  Respiratory: Negative for cough, chest tightness and shortness of breath  Cardiovascular: Negative for chest pain, palpitations and leg swelling  Gastrointestinal: Positive for abdominal pain  Negative for blood in stool, diarrhea, nausea and vomiting  Endocrine: Negative for polyuria  Genitourinary: Positive for dysuria, frequency and hematuria  Negative for difficulty urinating  Musculoskeletal: Positive for back pain  Neurological: Positive for weakness  Negative for dizziness, speech difficulty and headaches  All other systems reviewed and are negative        Past Medical and Surgical History:     Past Medical History:   Diagnosis Date    Diabetes mellitus ([facility] [address] )     type 2    Hypertension     Psychiatric disorder     depression       History reviewed  No pertinent surgical history  Meds/Allergies:    Prior to Admission medications    Medication Sig Start Date End Date Taking? Authorizing Provider   Jose Juan Harbert 100 units/mL injection pen  10/31/19  Yes Historical Provider, MD   citalopram (CeleXA) 20 mg tablet  10/26/19  Yes Historical Provider, MD   gabapentin (NEURONTIN) 100 mg capsule Take 100 mg by mouth 3 (three) times a day   Yes Historical Provider, MD   metFORMIN (GLUCOPHAGE) 1000 MG tablet Take 1,000 mg by mouth 2 (two) times a day with meals   Yes Historical Provider, MD   ACCU-CHEK GUIDE test strip  10/31/19   Historical Provider, MD   atorvastatin (LIPITOR) 20 mg tablet  10/26/19   Historical Provider, MD CHIN UF III MINI PEN NEEDLES 31G X 5 MM MISC  10/23/19   Historical Provider, MD CHIN ULTRAFINE III SHORT PEN 31G X 8 MM MISC  10/22/19   Historical Provider, MD   Blood Glucose Monitoring Suppl (ACCU-CHEK GUIDE ME) w/Device KIT  10/31/19   Historical Provider, MD   gabapentin (NEURONTIN) 600 MG tablet  10/26/19   Historical Provider, MD   glimepiride (AMARYL) 2 mg tablet  10/26/19   Historical Provider, MD   LEVEMIR FLEXTOUCH 100 units/mL injection pen  10/21/19   Historical Provider, MD     I have reviewed home medications with patient personally  Allergies:    Allergies   Allergen Reactions    Penicillins        Social History:     Marital Status: Single     Substance Use History:   Social History     Substance and Sexual Activity   Alcohol Use Not Currently    Comment: occasionally     Social History     Tobacco Use   Smoking Status Current Every Day Smoker    Packs/day: 0 50    Types: Cigarettes   Smokeless Tobacco Never Used     Social History     Substance and Sexual Activity   Drug Use No       Family History:      Family History   Problem Relation Age of Onset    Diabetes Mother     Diabetes Maternal Grandmother      Physical Exam:     Vitals:   Blood Pressure: 129/77 (02/28/21 1500)  Pulse: 103 (02/28/21 1500)  Temperature: 98 8 °F (37 1 °C) (02/28/21 1415)  Temp Source: Oral (02/28/21 1415)  Respirations: (!) 23 (02/28/21 1500)  Height: 5' 5" (165 1 cm) (02/28/21 1150)  Weight - Scale: 52 2 kg (115 lb) (02/28/21 1150)  SpO2: 98 % (02/28/21 1500)    Physical Exam  Vitals signs reviewed  Constitutional:       Appearance: Normal appearance  He is ill-appearing  HENT:      Head: Normocephalic and atraumatic  Mouth/Throat:      Mouth: Mucous membranes are dry  Pharynx: No oropharyngeal exudate  Eyes:      General: No scleral icterus  Extraocular Movements: Extraocular movements intact  Neck:      Musculoskeletal: Normal range of motion and neck supple  Cardiovascular:      Rate and Rhythm: Normal rate and regular rhythm  Pulses: Normal pulses  Heart sounds: Normal heart sounds  No murmur  Pulmonary:      Effort: Pulmonary effort is normal  No respiratory distress  Breath sounds: Normal breath sounds  No wheezing  Abdominal:      General: Bowel sounds are normal  There is no distension  Palpations: Abdomen is soft  Tenderness: There is abdominal tenderness  There is right CVA tenderness and left CVA tenderness  There is no guarding or rebound  Musculoskeletal: Normal range of motion  Right lower leg: No edema  Left lower leg: No edema  Skin:     General: Skin is warm and dry  Findings: No rash  Neurological:      General: No focal deficit present  Mental Status: He is alert and oriented to person, place, and time  Cranial Nerves: No cranial nerve deficit  Psychiatric:         Mood and Affect: Mood normal              Additional Data:     Lab Results: I have personally reviewed pertinent reports        Results from last 7 days   Lab Units 02/28/21  1234   WBC Thousand/uL 27 00*   HEMOGLOBIN g/dL 14 5   HEMATOCRIT % 41 7   PLATELETS Thousands/uL 562*   BANDS PCT % 3   LYMPHO PCT % 2*   MONO PCT % 8   EOS PCT % 2     Results from last 7 days   Lab Units 02/28/21  1234   SODIUM mmol/L 124*   POTASSIUM mmol/L 4 2   CHLORIDE mmol/L 85*   CO2 mmol/L 32   BUN mg/dL 19   CREATININE mg/dL 0 86   ANION GAP mmol/L 7   CALCIUM mg/dL 9 6   ALBUMIN g/dL 2 7*   TOTAL BILIRUBIN mg/dL 0 68   ALK PHOS U/L 148*   ALT U/L 9*   AST U/L 5   GLUCOSE RANDOM mg/dL 547*     Results from last 7 days   Lab Units 02/28/21  1234   INR  1 02     Results from last 7 days   Lab Units 02/28/21  1610 02/28/21  1150   POC GLUCOSE mg/dl 334* >500*         Results from last 7 days   Lab Units 02/28/21  1234   LACTIC ACID mmol/L 1 5   PROCALCITONIN ng/ml 0 35*       Imaging: I have personally reviewed pertinent reports  CT abdomen pelvis with contrast   Final Result by Nancy Yepez MD (02/28 1191)         1  Bilateral pyelonephritis with suspected superinfected infected cyst exophytic from the left lower pole measuring 4 9 x 2 5 cm    2   Small enhancing abscesses in both lobes of the prostate gland with adjacent cystitis, findings consistent with complicated prostatitis with secondary ascending infection  I personally discussed this study with NELSON METZGER on 2/28/2021 at 2:47 PM                      Workstation performed: [id number]         XR chest portable   Final Result by Henok Chance DO (02/28 1435)      No acute cardiopulmonary disease  Workstation performed: [id number]             EKG, Pathology, and Other Studies Reviewed on Admission:   · yes    Allscripts / Epic Records Reviewed: Yes     ** Please Note: This note has been constructed using a voice recognition system   **

## 2021-02-28 NOTE — ASSESSMENT & PLAN NOTE
Patient with underlying poorly-controlled diabetes, poor insulin compliance, presented with bilateral back pain, fatigue, dysuria, and hematuria    CT scan with Bilateral pyelonephritis with suspected superinfected infected cyst exophytic from the left lower pole measuring 4 9 x 2 5 cm  Small enhancing abscesses in both lobes of the prostate gland with adjacent cystitis, findings consistent with complicated prostatitis with secondary ascending infection      Abnormal urinalysis  Start broad-spectrum IV antibiotic  Urology is planning for cystoscopy tomorrow  Follow on urine and blood cultures  Pain control

## 2021-02-28 NOTE — ED NOTES
Pt states he hasn't been out of the house in 3 weeks due to worsening depression  Hasn't been able to refill medications either  Pt states "I just get so depressed and anxious I stop taking my meds, I get sick of it"        Sheridan Newton, TRUPTI  02/28/21 0212

## 2021-02-28 NOTE — QUICK NOTE
Contacted by ED regarding patient  Presented with uncontrolled Diabetic in DKA with sepsis 2/2 to prostate abscesses and bl pyelonephritis  Recommend managing DKA to optimize patient for cysto/TURP un salima of abscesses  Plan tentatively tomorrow  Would recommend broadening antibiotics to Cefepime/Vanc  Check PVR to ensure patient is not in urinary retention given above  Formal consult to follow tomorrow 3/1/21

## 2021-02-28 NOTE — SEPSIS NOTE
Sepsis Note   Brittanie Officer 40 y o  male MRN: [id number]  Unit/Bed#: ED 02 Encounter: [id number]      qSOFA     Row Name 02/28/21 1300 02/28/21 1245 02/28/21 1205 02/28/21 1150       Altered mental status GCS < 15  --  --  0  --     Respiratory Rate > / =22  0  1  --  0     Systolic BP < / =375  0  0  --  0     Q Sofa Score  0  1  0  0         Initial Sepsis Screening     Row Name 02/28/21 1200                Is the patient's history suggestive of a new or worsening infection? (!) Yes (Proceed)  -MARY        Suspected source of infection  urinary tract infection  -MARY        Are two or more of the following signs & symptoms of infection both present and new to the patient? (!) Yes (Proceed)  -MARY        Indicate SIRS criteria  Hyperthemia > 38 3C (100 9F); Tachycardia > 90 bpm  -MARY        If the answer is yes to both questions, suspicion of sepsis is present  --        If severe sepsis is present AND tissue hypoperfusion perists in the hour after fluid resuscitation or lactate > 4, the patient meets criteria for SEPTIC SHOCK  --        Are any of the following organ dysfunction criteria present within 6 hours of suspected infection and SIRS criteria that are NOT considered to be chronic conditions?   No  -MARY        Organ dysfunction  --        Date of presentation of severe sepsis  --        Time of presentation of severe sepsis  --        Tissue hypoperfusion persists in the hour after crystalloid fluid administration, evidenced, by either:  --        Was hypotension present within one hour of the conclusion of crystalloid fluid administration?  --        Date of presentation of septic shock  --        Time of presentation of septic shock  --          User Key  (r) = Recorded By, (t) = Taken By, (c) = Cosigned By    [address] Name Provider Stephon Yap MD Resident

## 2021-02-28 NOTE — ED ATTENDING ATTESTATION
2/28/2021  I, Braeden Jeong DO, saw and evaluated the patient  I have discussed the patient with the resident/non-physician practitioner and agree with the resident's/non-physician practitioner's findings, Plan of Care, and MDM as documented in the resident's/non-physician practitioner's note, except where noted  All available labs and Radiology studies were reviewed  I was present for key portions of any procedure(s) performed by the resident/non-physician practitioner and I was immediately available to provide assistance  At this point I agree with the current assessment done in the Emergency Department  I have conducted an independent evaluation of this patient a history and physical is as follows:    Patient presents for ongoing diffuse pain in his back and abdomen, myalgias, chills, or hematuria with clots, urinary frequency, fatigue yet insomnia in general malaise  Symptoms have been ongoing for at least 2 weeks, recently worsening  He has a h/o diabetes but has not been taking his medications as prescribed  He has been unaware if he has a fever but has felt chills and has taken ibuprofen for that  He ibuprofen has not helped his pains  Reports feeling general despair and depression but no discrete SI  No known h/o DKA  No recent travel or similar sick contacts  ROS: Denies HA, CP, SOB, n/v/d  12 system ROS o/w negative  PE:  Moderate distress, ill-appearing, alert; PERRL, EOMI; mucous membranes moderately dry, no posterior oropharyngeal exudate, edema or erythema; HRR, tachycardic, no murmur, monitor shows sinus tachycardia at 125 bpm; lungs CTA w/o w/r/r, POx 99% on RA (nl); abdomen s/nd, diffusely TTP without r/g/r, nl BS in all 4 quadrant; diffuse back tenderness except in the midline, (+) CVA TTP bilaterally; (-) LE edema or calf TTP, FROM extremities x4; skin p/w/d; CNs GI/NF, oriented       DDx:  Fever/back pain/abdominal pain/hematuria - ureteral stone/infected stone, UTI/pyelonephritis, sepsis, DKA, doubt meningitis or transverse myelitis  A/P: Will check septic and diabetic workup, CT abdomen and pelvis, treat symptoms, reevaluate for further work up and admit  ED Course         Critical Care Time  CriticalCare Time  Performed by: Sun Zimmer DO  Authorized by:  Sun Zimmer DO     Critical care provider statement:     Critical care time (minutes):  30    Critical care time was exclusive of:  Separately billable procedures and treating other patients and teaching time    Critical care was necessary to treat or prevent imminent or life-threatening deterioration of the following conditions:  Sepsis and endocrine crisis    Critical care was time spent personally by me on the following activities:  Obtaining history from patient or surrogate, development of treatment plan with patient or surrogate, discussions with consultants, evaluation of patient's response to treatment, examination of patient, ordering and performing treatments and interventions, ordering and review of laboratory studies, ordering and review of radiographic studies, re-evaluation of patient's condition and review of old charts    I assumed direction of critical care for this patient from another provider in my specialty: no

## 2021-02-28 NOTE — ASSESSMENT & PLAN NOTE
No results found for: HGBA1C    Recent Labs     02/28/21  1150   POCGLU >500*       Blood Sugar Average: Last 72 hrs:     Poorly-controlled  Noncompliance  Continue with insulin drip for now  Check A1c  Consult endocrine for further management

## 2021-02-28 NOTE — ED PROVIDER NOTES
Final Diagnosis:  1  Abscess of prostate    2  DKA (diabetic ketoacidoses) ([facility] )    3  Sepsis ([facility] )    4  Pyelonephritis        Chief Complaint   Patient presents with    Back Pain     Pt c/o diffuse back pain, increased urination, fatigue and insomnia x2 weeks  HPI  History of diabetes and psychiatric presents with 1 month of uncontrolled blood sugar in the setting depression  No suicidality but caring for herself  Today he presents because he has worsening abdominal pain  One week ago he had blood clots in his urine  Currently is having no trouble urinating  He is not having suprapubic pain that radiates into bilateral flanks  His glucose being greater than 500 on multiple measurements  He is tachycardic to about 120  Nontoxic appearing  He is fatigued he is cachectic  He has a fever      - No language barrier    - History obtained from patient  - There are no limitations to the history obtained  - Previous charting underwent limited review with attention to labs, ekgs, and prior imaging  PMH:   has a past medical history of Diabetes mellitus ([facility] ), Hypertension, and Psychiatric disorder  PSH:   has no past surgical history on file  Social History:  Presents with his daughter who is tearful about his condition  ROS:  Review of Systems   Constitutional: Positive for fatigue  Negative for activity change, chills, diaphoresis and fever  HENT: Negative for congestion, postnasal drip, rhinorrhea, sneezing, sore throat and trouble swallowing  Eyes: Negative for visual disturbance  Respiratory: Negative for cough, chest tightness, shortness of breath and wheezing  Cardiovascular: Negative for chest pain and leg swelling  Gastrointestinal: Positive for abdominal pain and nausea  Negative for abdominal distention, blood in stool, constipation, diarrhea and vomiting  Genitourinary: Negative for decreased urine volume, dysuria, flank pain, frequency, hematuria and urgency  Skin: Negative for color change and rash  Neurological: Negative for syncope, weakness, light-headedness and headaches  Psychiatric/Behavioral: Positive for dysphoric mood  Negative for confusion and sleep disturbance  All other systems reviewed and are negative  PE:   Vitals:    02/28/21 1245 02/28/21 1300 02/28/21 1415 02/28/21 1500   BP: 164/90 150/81  129/77   BP Location: Left arm Left arm  Left arm   Pulse: (!) 122 (!) 120 (!) 107 103   Resp: (!) 24 16 21 (!) 23   Temp:   98 8 °F (37 1 °C)    TempSrc:   Oral    SpO2: 97% 97% 97% 98%   Weight:       Height:         Vitals reviewed by me  Physical Exam  Vitals signs and nursing note reviewed  Constitutional:       General: He is not in acute distress  Appearance: He is well-developed  He is not diaphoretic  HENT:      Head: Normocephalic and atraumatic  Nose: Nose normal    Eyes:      General: No scleral icterus  Conjunctiva/sclera: Conjunctivae normal       Pupils: Pupils are equal, round, and reactive to light  Neck:      Musculoskeletal: Normal range of motion and neck supple  Trachea: No tracheal deviation  Cardiovascular:      Rate and Rhythm: Regular rhythm  Tachycardia present  Heart sounds: Normal heart sounds  No murmur  Pulmonary:      Effort: Pulmonary effort is normal  No respiratory distress  Breath sounds: Normal breath sounds  No stridor  No wheezing  Abdominal:      General: Bowel sounds are normal  There is no distension  Palpations: Abdomen is soft  Tenderness: There is abdominal tenderness  There is right CVA tenderness, left CVA tenderness and guarding  Musculoskeletal: Normal range of motion  General: No tenderness or deformity  Skin:     General: Skin is warm and dry  Capillary Refill: Capillary refill takes less than 2 seconds  Neurological:      Mental Status: He is alert and oriented to person, place, and time        Cranial Nerves: No cranial nerve deficit  Sensory: No sensory deficit  Motor: No abnormal muscle tone  Psychiatric:         Behavior: Behavior normal          Thought Content: Thought content normal          Judgment: Judgment normal           A:  - Nursing note reviewed  Differential includes but not limited to UTI, pyelo, sepsis  Will give ceftriaxone and CT given abd tenderness  Blood cultures, urine cultures, lactic acid, fluid    See sepsis notes    DKA - give 10 regular insulin - set up insulin infusion  2L fluid bolus, potassium repletion  urology consult  MICU consult                  CT abdomen pelvis with contrast   Final Result         1  Bilateral pyelonephritis with suspected superinfected infected cyst exophytic from the left lower pole measuring 4 9 x 2 5 cm    2   Small enhancing abscesses in both lobes of the prostate gland with adjacent cystitis, findings consistent with complicated prostatitis with secondary ascending infection  I personally discussed this study with NELSON METZGER on 2/28/2021 at 2:47 PM                      Workstation performed: [id number]         XR chest portable   Final Result      No acute cardiopulmonary disease  Workstation performed: [id number]           Orders Placed This Encounter   Procedures    Critical Care    Blood culture #1    Blood culture #2    COVID19, Influenza A/B, RSV PCR, [facility]    Urine culture    XR chest portable    CT abdomen pelvis with contrast    CBC and differential    Comprehensive metabolic panel    Lactic acid    Procalcitonin with AM Reflex    Protime-INR    APTT    Beta Hydroxybutyrate    Urine Microscopic    BMP Q8 hours X 3 (Hyponatremia monitoring)    Procalcitonin Reflex    Diet NPO    Urine dip analyzer    Insert peripheral IV    Measure post void residual    Nursing communication Continue IV as ordered     Gill Rose Notify admitting physician    Notify admitting physician on arrival    Contact and Airborne isolation status    ECG 12 lead    Inpatient Admission     Labs Reviewed   CBC AND DIFFERENTIAL - Abnormal       Result Value Ref Range Status    WBC 27 00 (*) 4 31 - 10 16 Thousand/uL Final    RBC 5 01  3 88 - 5 62 Million/uL Final    Hemoglobin 14 5  12 0 - 17 0 g/dL Final    Hematocrit 41 7  36 5 - 49 3 % Final    MCV 83  82 - 98 fL Final    MCH 28 9  26 8 - 34 3 pg Final    MCHC 34 8  31 4 - 37 4 g/dL Final    RDW 11 5 (*) 11 6 - 15 1 % Final    MPV 9 0  8 9 - 12 7 fL Final    Platelets 175 (*) 186 - 390 Thousands/uL Final    nRBC 0  /100 WBCs Final    Comment: This is an appended report  These results have been appended to a previously preliminary verified report  Narrative: This is an appended report  These results have been appended to a previously verified report  COMPREHENSIVE METABOLIC PANEL - Abnormal    Sodium 124 (*) 136 - 145 mmol/L Final    Potassium 4 2  3 5 - 5 3 mmol/L Final    Chloride 85 (*) 100 - 108 mmol/L Final    CO2 32  21 - 32 mmol/L Final    ANION GAP 7  4 - 13 mmol/L Final    BUN 19  5 - 25 mg/dL Final    Creatinine 0 86  0 60 - 1 30 mg/dL Final    Comment: Standardized to IDMS reference method    Glucose 547 (*) 65 - 140 mg/dL Final    Comment: If the patient is fasting, the ADA then defines impaired fasting glucose as > 100 mg/dL and diabetes as > or equal to 123 mg/dL  Specimen collection should occur prior to Sulfasalazine administration due to the potential for falsely depressed results  Specimen collection should occur prior to Sulfapyridine administration due to the potential for falsely elevated results  Calcium 9 6  8 3 - 10 1 mg/dL Final    Corrected Calcium 10 6 (*) 8 3 - 10 1 mg/dL Final    AST 5  5 - 45 U/L Final    Comment: Specimen collection should occur prior to Sulfasalazine administration due to the potential for falsely depressed results       ALT 9 (*) 12 - 78 U/L Final    Comment: Specimen collection should occur prior to Sulfasalazine and/or Sulfapyridine administration due to the potential for falsely depressed results  Alkaline Phosphatase 148 (*) 46 - 116 U/L Final    Total Protein 7 6  6 4 - 8 2 g/dL Final    Albumin 2 7 (*) 3 5 - 5 0 g/dL Final    Total Bilirubin 0 68  0 20 - 1 00 mg/dL Final    Comment: Use of this assay is not recommended for patients undergoing treatment with eltrombopag due to the potential for falsely elevated results  eGFR 105  ml/min/1 73sq m Final    Narrative:     [place] guidelines for Chronic Kidney Disease (CKD):     Stage 1 with normal or high GFR (GFR > 90 mL/min/1 73 square meters)    Stage 2 Mild CKD (GFR = 60-89 mL/min/1 73 square meters)    Stage 3A Moderate CKD (GFR = 45-59 mL/min/1 73 square meters)    Stage 3B Moderate CKD (GFR = 30-44 mL/min/1 73 square meters)    Stage 4 Severe CKD (GFR = 15-29 mL/min/1 73 square meters)    Stage 5 End Stage CKD (GFR <15 mL/min/1 73 square meters)  Note: GFR calculation is accurate only with a steady state creatinine   PROCALCITONIN TEST - Abnormal    Procalcitonin 0 35 (*) <=0 25 ng/ml Final    Comment: Suspected Lower Respiratory Tract Infection (LRTI):  - LESS than or EQUAL to 0 25 ng/mL:   low likelihood for bacterial LRTI; antibiotics DISCOURAGED   - GREATER than 0 25 ng/mL:   increased likelihood for bacterial LRTI; antibiotics ENCOURAGED  Suspected Sepsis:  - Strongly consider initiating antibiotics in ALL UNSTABLE patients  - LESS than or EQUAL to 0 5 ng/mL:   low likelihood for bacterial sepsis; antibiotics DISCOURAGED   - GREATER than 0 5 ng/mL:   increased likelihood for bacterial sepsis; antibiotics ENCOURAGED   - GREATER than 2 ng/mL:   high risk for severe sepsis / septic shock; antibiotics strongly ENCOURAGED  Decisions on antibiotic use should not be based solely on Procalcitonin (PCT) levels  If PCT is low but uncertainty exists with stopping antibiotics, repeat PCT in 6-24 hours to confirm the low level   If antibiotics are administered (regardless if initial PCT was high or low), repeat PCT every 1-2 days to consider early antibiotic cessation (when GREATER than 80% decrease from the peak OR when PCT drops below designated cutoffs, whichever comes first), so long as the infection is NOT one that typically requires prolonged treatment durations (e g , bone/joint infections, endocarditis, Staph  aureus bacteremia)      Situations of FALSE-POSITIVE Procalcitonin values:  1) Newborns < 67 hours old  2) Massive stress from severe trauma / burns, major surgery, acute pancreatitis, cardiogenic / hemorrhagic shock, sickle cell crisis, or other organ perfusion abnormalities  3) Malaria and some Candidal infections  4) Treatment with agents that stimulate cytokines (e g , OKT3, anti-lymphocyte globulins, alemtuzumab, IL-2, granulocyte transfusion [NOT GCSFs])  5) Chronic renal disease causes elevated baseline levels (consider GREATER than 0 75 ng/mL as an abnormal cut-off); initiating HD/CRRT may cause transient decreases  6) Paraneoplastic syndromes from medullary thyroid or SCLC, some forms of vasculitis, and acute [id number] disease    Situations of FALSE-NEGATIVE Procalcitonin values:  1) Too early in clinical course for PCT to have reached its peak (may repeat in 6-24 hours to confirm low level)  2) Localized infection WITHOUT systemic (SIRS / sepsis) response (e g , an abscess, osteomyelitis, cystitis)  3) Mycobacteria (e g , Tuberculosis, MAC)  4) Cystic fibrosis exacerbations     BETA HYDROXYBUTYRATE - Abnormal    BETA-HYDROXYBUTYRATE 1 7 (*) <0 6 mmol/L Final   URINE MICROSCOPIC - Abnormal    RBC, UA None Seen  None Seen, 2-4 /hpf Final    WBC, UA 30-50 (*) None Seen, 2-4 /hpf Final    Epithelial Cells None Seen  None Seen, Occasional /hpf Final    Bacteria, UA Moderate (*) None Seen, Occasional /hpf Final    WBC Clumps Clumped WBC's seen   Final   POCT GLUCOSE - Abnormal    POC Glucose >500 (*) 65 - 140 mg/dl Final Comment: CRITICAL VALUE NOTED   URINE MACROSCOPIC, POC - Abnormal    Color, UA Yellow   Final    Clarity, UA Clear   Final    pH, UA 6 0  4 5 - 8 0 Final    Leukocytes, UA Trace (*) Negative Final    Nitrite, UA Positive (*) Negative Final    Protein, UA Negative  Negative mg/dl Final    Glucose, UA >=1000 (1%) (*) Negative mg/dl Final    Ketones, UA 40 (2+) (*) Negative mg/dl Final    Urobilinogen, UA 0 2  0 2, 1 0 E U /dl E U /dl Final    Bilirubin, UA Negative  Negative Final    Blood, UA Small (*) Negative Final    Specific Gravity, UA <=1 005  1 003 - 1 030 Final    Narrative:     CLINITEK RESULT   MANUAL DIFFERENTIAL(PHLEBS DO NOT ORDER) - Abnormal    Segmented % 79 (*) 43 - 75 % Final    Bands % 3  0 - 8 % Final    Lymphocytes % 2 (*) 14 - 44 % Final    Monocytes % 8  4 - 12 % Final    Eosinophils, % 2  0 - 6 % Final    Basophils % 0  0 - 1 % Final    Atypical Lymphocytes % 6 (*) <=0 % Final    Absolute Neutrophils 22 14 (*) 1 85 - 7 62 Thousand/uL Final    Lymphocytes Absolute 0 54 (*) 0 60 - 4 47 Thousand/uL Final    Monocytes Absolute 2 16 (*) 0 00 - 1 22 Thousand/uL Final    Eosinophils Absolute 0 54 (*) 0 00 - 0 40 Thousand/uL Final    Basophils Absolute 0 00  0 00 - 0 10 Thousand/uL Final    Total Counted     Final    RBC Morphology Normal   Final    Platelet Estimate Increased (*) Adequate Final   COVID19, INFLUENZA A/B, RSV PCR, [facility] - Normal    SARS-CoV-2 Negative  Negative Final    INFLUENZA A PCR Negative  Negative Final    INFLUENZA B PCR Negative  Negative Final    RSV PCR Negative  Negative Final    Narrative: This test has been authorized by FDA under an EUA (Emergency Use Assay) for use by authorized laboratories  Clinical caution and judgement should be used with the interpretation of these results with consideration of the clinical impression and other laboratory testing    Testing reported as "Positive" or "Negative" has been proven to be accurate according to standard laboratory validation requirements  All testing is performed with control materials showing appropriate reactivity at standard intervals  LACTIC ACID, PLASMA - Normal    LACTIC ACID 1 5  0 5 - 2 0 mmol/L Final    Narrative:     Result may be elevated if tourniquet was used during collection  PROTIME-INR - Normal    Protime 13 4  11 6 - 14 5 seconds Final    INR 1 02  0 84 - 1 19 Final   APTT - Normal    PTT 32  23 - 37 seconds Final    Comment: Therapeutic Heparin Range =  60-90 seconds   BLOOD CULTURE   BLOOD CULTURE   URINE CULTURE   BASIC METABOLIC PANEL   PROCALCITONIN REFLEX       Final Diagnosis:  1  Abscess of prostate    2  DKA (diabetic ketoacidoses) ([facility] )    3  Sepsis ([facility] )    4   Pyelonephritis        P:  - broad abx  F/u cultures inpatient  Urology to OR tomorrow  NPO at midnight  Admitted to [address] [place] [address]   dextrose 5 % and sodium chloride 0 9 % infusion (0 mL/hr Intravenous Hold 2/28/21 1419)   insulin regular (HumuLIN R,NovoLIN R) 1 Units/mL in sodium chloride 0 9 % 100 mL infusion (5 2 Units/hr Intravenous New Bag 2/28/21 1416)   multi-electrolyte (ISOLYTE-S PH 7 4) bolus 1,000 mL (has no administration in time range)   vancomycin (VANCOCIN) IVPB (premix in dextrose) 1,000 mg 200 mL (has no administration in time range)   cefTRIAXone (ROCEPHIN) 2,000 mg in dextrose 5 % 50 mL IVPB (0 mg Intravenous Stopped 2/28/21 1419)   multi-electrolyte (ISOLYTE-S PH 7 4) bolus 1,000 mL (0 mL Intravenous Stopped 2/28/21 1354)   acetaminophen (TYLENOL) tablet 975 mg (975 mg Oral Given 2/28/21 1240)   insulin regular (HumuLIN R,NovoLIN R) injection 10 Units (10 Units Intravenous Given 2/28/21 1351)   potassium chloride (K-DUR,KLOR-CON) CR tablet 40 mEq (40 mEq Oral Given 2/28/21 1350)   iohexol (OMNIPAQUE) 350 MG/ML injection (MULTI-DOSE) 100 mL (100 mL Intravenous Given 2/28/21 1406)     Time reflects when diagnosis was documented in both MDM as applicable and the Disposition within this note     Time User Action Codes Description Comment    2/28/2021  3:21 PM Christal Campos Add [N41 2] Abscess of prostate     2/28/2021  3:58 PM Annemarie Carlos Add [E11 10] DKA (diabetic ketoacidoses) ([facility])     2/28/2021  3:58 PM Annemarie Gonzalez Add [A41 9] Sepsis ([facility])     2/28/2021  3:58 PM Annemarie Gonzalez Add [N12] Pyelonephritis       ED Disposition     ED Disposition Condition Date/Time Comment    Admit Stable Sun Feb 28, 2021  3:58 PM Case was discussed with MARRY and the patient's admission status was agreed to be Admission Status: inpatient status to the service of Dr Nighat Roberts   Follow-up Information    None       Patient's Medications   Discharge Prescriptions    No medications on file     No discharge procedures on file  Prior to Admission Medications   Prescriptions Last Dose Informant Patient Reported? Taking? ACCU-CHEK GUIDE test strip   Yes No   B-D UF III MINI PEN NEEDLES 31G X 5 MM MISC   Yes No   B-D ULTRAFINE III SHORT PEN 31G X 8 MM MISC   Yes No   BASAGLAR KWIKPEN 100 units/mL injection pen Past Week at Unknown time  Yes Yes   Blood Glucose Monitoring Suppl (ACCU-CHEK GUIDE ME) w/Device KIT   Yes No   LEVEMIR FLEXTOUCH 100 units/mL injection pen   Yes No   atorvastatin (LIPITOR) 20 mg tablet   Yes No   citalopram (CeleXA) 20 mg tablet Past Week at Unknown time  Yes Yes   gabapentin (NEURONTIN) 100 mg capsule Past Week at Unknown time  Yes Yes   Sig: Take 100 mg by mouth 3 (three) times a day   gabapentin (NEURONTIN) 600 MG tablet   Yes No   glimepiride (AMARYL) 2 mg tablet   Yes No   metFORMIN (GLUCOPHAGE) 1000 MG tablet Past Week at Unknown time  Yes Yes   Sig: Take 1,000 mg by mouth 2 (two) times a day with meals      Facility-Administered Medications Last Administration Doses Remaining   lidocaine (PF) (XYLOCAINE-MPF) 1 % injection 4 mL None recorded 1          Portions of the record may have been created with voice recognition software   Occasional wrong word or "sound a like" substitutions may have occurred due to the inherent limitations of voice recognition software  Read the chart carefully and recognize, using context, where substitutions have occurred      Electronically signed by:  Trinity Bai, PGY 2, MD Luca Carter MD  02/28/21 [id number]

## 2021-03-01 ENCOUNTER — ANESTHESIA EVENT (INPATIENT)
Dept: PERIOP | Facility: HOSPITAL | Age: 46
DRG: 710 | End: 2021-03-01
Payer: COMMERCIAL

## 2021-03-01 ENCOUNTER — ANESTHESIA (INPATIENT)
Dept: PERIOP | Facility: HOSPITAL | Age: 46
DRG: 710 | End: 2021-03-01
Payer: COMMERCIAL

## 2021-03-01 LAB
ALBUMIN SERPL BCP-MCNC: 2 G/DL (ref 3.5–5)
ALP SERPL-CCNC: 157 U/L (ref 46–116)
ALT SERPL W P-5'-P-CCNC: <6 U/L (ref 12–78)
ANION GAP SERPL CALCULATED.3IONS-SCNC: 3 MMOL/L (ref 4–13)
ANION GAP SERPL CALCULATED.3IONS-SCNC: 3 MMOL/L (ref 4–13)
ANION GAP SERPL CALCULATED.3IONS-SCNC: 5 MMOL/L (ref 4–13)
AST SERPL W P-5'-P-CCNC: 7 U/L (ref 5–45)
BASOPHILS # BLD AUTO: 0.07 THOUSANDS/ΜL (ref 0–0.1)
BASOPHILS NFR BLD AUTO: 0 % (ref 0–1)
BILIRUB SERPL-MCNC: 0.16 MG/DL (ref 0.2–1)
BUN SERPL-MCNC: 10 MG/DL (ref 5–25)
BUN SERPL-MCNC: 7 MG/DL (ref 5–25)
BUN SERPL-MCNC: 8 MG/DL (ref 5–25)
CALCIUM ALBUM COR SERPL-MCNC: 10.7 MG/DL (ref 8.3–10.1)
CALCIUM SERPL-MCNC: 8.4 MG/DL (ref 8.3–10.1)
CALCIUM SERPL-MCNC: 8.7 MG/DL (ref 8.3–10.1)
CALCIUM SERPL-MCNC: 9.1 MG/DL (ref 8.3–10.1)
CHLORIDE SERPL-SCNC: 103 MMOL/L (ref 100–108)
CHLORIDE SERPL-SCNC: 105 MMOL/L (ref 100–108)
CHLORIDE SERPL-SCNC: 99 MMOL/L (ref 100–108)
CO2 SERPL-SCNC: 29 MMOL/L (ref 21–32)
CO2 SERPL-SCNC: 31 MMOL/L (ref 21–32)
CO2 SERPL-SCNC: 32 MMOL/L (ref 21–32)
CREAT SERPL-MCNC: 0.41 MG/DL (ref 0.6–1.3)
CREAT SERPL-MCNC: 0.42 MG/DL (ref 0.6–1.3)
CREAT SERPL-MCNC: 0.48 MG/DL (ref 0.6–1.3)
EOSINOPHIL # BLD AUTO: 0.09 THOUSAND/ΜL (ref 0–0.61)
EOSINOPHIL NFR BLD AUTO: 0 % (ref 0–6)
ERYTHROCYTE [DISTWIDTH] IN BLOOD BY AUTOMATED COUNT: 11.6 % (ref 11.6–15.1)
EST. AVERAGE GLUCOSE BLD GHB EST-MCNC: >355 MG/DL
GFR SERPL CREATININE-BSD FRML MDRD: 133 ML/MIN/1.73SQ M
GFR SERPL CREATININE-BSD FRML MDRD: 141 ML/MIN/1.73SQ M
GFR SERPL CREATININE-BSD FRML MDRD: 142 ML/MIN/1.73SQ M
GLUCOSE SERPL-MCNC: 112 MG/DL (ref 65–140)
GLUCOSE SERPL-MCNC: 116 MG/DL (ref 65–140)
GLUCOSE SERPL-MCNC: 123 MG/DL (ref 65–140)
GLUCOSE SERPL-MCNC: 124 MG/DL (ref 65–140)
GLUCOSE SERPL-MCNC: 128 MG/DL (ref 65–140)
GLUCOSE SERPL-MCNC: 128 MG/DL (ref 65–140)
GLUCOSE SERPL-MCNC: 132 MG/DL (ref 65–140)
GLUCOSE SERPL-MCNC: 133 MG/DL (ref 65–140)
GLUCOSE SERPL-MCNC: 134 MG/DL (ref 65–140)
GLUCOSE SERPL-MCNC: 162 MG/DL (ref 65–140)
GLUCOSE SERPL-MCNC: 169 MG/DL (ref 65–140)
GLUCOSE SERPL-MCNC: 176 MG/DL (ref 65–140)
GLUCOSE SERPL-MCNC: 188 MG/DL (ref 65–140)
GLUCOSE SERPL-MCNC: 228 MG/DL (ref 65–140)
GLUCOSE SERPL-MCNC: 237 MG/DL (ref 65–140)
GLUCOSE SERPL-MCNC: 317 MG/DL (ref 65–140)
HBA1C MFR BLD: >14 %
HCT VFR BLD AUTO: 35.1 % (ref 36.5–49.3)
HGB BLD-MCNC: 11.8 G/DL (ref 12–17)
IMM GRANULOCYTES # BLD AUTO: 0.45 THOUSAND/UL (ref 0–0.2)
IMM GRANULOCYTES NFR BLD AUTO: 2 % (ref 0–2)
LYMPHOCYTES # BLD AUTO: 2.45 THOUSANDS/ΜL (ref 0.6–4.47)
LYMPHOCYTES NFR BLD AUTO: 11 % (ref 14–44)
MAGNESIUM SERPL-MCNC: 1.9 MG/DL (ref 1.6–2.6)
MCH RBC QN AUTO: 28.6 PG (ref 26.8–34.3)
MCHC RBC AUTO-ENTMCNC: 33.6 G/DL (ref 31.4–37.4)
MCV RBC AUTO: 85 FL (ref 82–98)
MONOCYTES # BLD AUTO: 2.86 THOUSAND/ΜL (ref 0.17–1.22)
MONOCYTES NFR BLD AUTO: 12 % (ref 4–12)
NEUTROPHILS # BLD AUTO: 17.51 THOUSANDS/ΜL (ref 1.85–7.62)
NEUTS SEG NFR BLD AUTO: 75 % (ref 43–75)
NRBC BLD AUTO-RTO: 0 /100 WBCS
PHOSPHATE SERPL-MCNC: 1.9 MG/DL (ref 2.7–4.5)
PLATELET # BLD AUTO: 405 THOUSANDS/UL (ref 149–390)
PLATELET # BLD AUTO: 483 THOUSANDS/UL (ref 149–390)
PMV BLD AUTO: 8.4 FL (ref 8.9–12.7)
PMV BLD AUTO: 8.8 FL (ref 8.9–12.7)
POTASSIUM SERPL-SCNC: 3.5 MMOL/L (ref 3.5–5.3)
POTASSIUM SERPL-SCNC: 3.7 MMOL/L (ref 3.5–5.3)
POTASSIUM SERPL-SCNC: 3.8 MMOL/L (ref 3.5–5.3)
PROCALCITONIN SERPL-MCNC: 0.5 NG/ML
PROT SERPL-MCNC: 6.2 G/DL (ref 6.4–8.2)
RBC # BLD AUTO: 4.13 MILLION/UL (ref 3.88–5.62)
SODIUM SERPL-SCNC: 134 MMOL/L (ref 136–145)
SODIUM SERPL-SCNC: 137 MMOL/L (ref 136–145)
SODIUM SERPL-SCNC: 139 MMOL/L (ref 136–145)
WBC # BLD AUTO: 23.43 THOUSAND/UL (ref 4.31–10.16)

## 2021-03-01 PROCEDURE — 80048 BASIC METABOLIC PNL TOTAL CA: CPT | Performed by: INTERNAL MEDICINE

## 2021-03-01 PROCEDURE — 87077 CULTURE AEROBIC IDENTIFY: CPT | Performed by: UROLOGY

## 2021-03-01 PROCEDURE — 85025 COMPLETE CBC W/AUTO DIFF WBC: CPT | Performed by: INTERNAL MEDICINE

## 2021-03-01 PROCEDURE — 52601 PROSTATECTOMY (TURP): CPT | Performed by: UROLOGY

## 2021-03-01 PROCEDURE — 88341 IMHCHEM/IMCYTCHM EA ADD ANTB: CPT | Performed by: PATHOLOGY

## 2021-03-01 PROCEDURE — 88305 TISSUE EXAM BY PATHOLOGIST: CPT | Performed by: PATHOLOGY

## 2021-03-01 PROCEDURE — 88342 IMHCHEM/IMCYTCHM 1ST ANTB: CPT | Performed by: PATHOLOGY

## 2021-03-01 PROCEDURE — 83735 ASSAY OF MAGNESIUM: CPT | Performed by: INTERNAL MEDICINE

## 2021-03-01 PROCEDURE — 85049 AUTOMATED PLATELET COUNT: CPT | Performed by: INTERNAL MEDICINE

## 2021-03-01 PROCEDURE — 87186 SC STD MICRODIL/AGAR DIL: CPT | Performed by: UROLOGY

## 2021-03-01 PROCEDURE — 82948 REAGENT STRIP/BLOOD GLUCOSE: CPT

## 2021-03-01 PROCEDURE — 83036 HEMOGLOBIN GLYCOSYLATED A1C: CPT | Performed by: INTERNAL MEDICINE

## 2021-03-01 PROCEDURE — 99232 SBSQ HOSP IP/OBS MODERATE 35: CPT | Performed by: UROLOGY

## 2021-03-01 PROCEDURE — 87086 URINE CULTURE/COLONY COUNT: CPT | Performed by: UROLOGY

## 2021-03-01 PROCEDURE — 80053 COMPREHEN METABOLIC PANEL: CPT | Performed by: INTERNAL MEDICINE

## 2021-03-01 PROCEDURE — 84145 PROCALCITONIN (PCT): CPT | Performed by: INTERNAL MEDICINE

## 2021-03-01 PROCEDURE — 99232 SBSQ HOSP IP/OBS MODERATE 35: CPT | Performed by: INTERNAL MEDICINE

## 2021-03-01 PROCEDURE — 84100 ASSAY OF PHOSPHORUS: CPT | Performed by: INTERNAL MEDICINE

## 2021-03-01 PROCEDURE — 99254 IP/OBS CNSLTJ NEW/EST MOD 60: CPT | Performed by: INTERNAL MEDICINE

## 2021-03-01 PROCEDURE — 88344 IMHCHEM/IMCYTCHM EA MLT ANTB: CPT | Performed by: PATHOLOGY

## 2021-03-01 PROCEDURE — 0VT08ZZ RESECTION OF PROSTATE, VIA NATURAL OR ARTIFICIAL OPENING ENDOSCOPIC: ICD-10-PCS | Performed by: UROLOGY

## 2021-03-01 PROCEDURE — 90686 IIV4 VACC NO PRSV 0.5 ML IM: CPT | Performed by: UROLOGY

## 2021-03-01 PROCEDURE — 36415 COLL VENOUS BLD VENIPUNCTURE: CPT | Performed by: INTERNAL MEDICINE

## 2021-03-01 PROCEDURE — 90471 IMMUNIZATION ADMIN: CPT | Performed by: UROLOGY

## 2021-03-01 PROCEDURE — 0TBC8ZZ EXCISION OF BLADDER NECK, VIA NATURAL OR ARTIFICIAL OPENING ENDOSCOPIC: ICD-10-PCS | Performed by: UROLOGY

## 2021-03-01 RX ORDER — LIDOCAINE HYDROCHLORIDE 20 MG/ML
JELLY TOPICAL AS NEEDED
Status: DISCONTINUED | OUTPATIENT
Start: 2021-03-01 | End: 2021-03-01 | Stop reason: HOSPADM

## 2021-03-01 RX ORDER — MIDAZOLAM HYDROCHLORIDE 2 MG/2ML
INJECTION, SOLUTION INTRAMUSCULAR; INTRAVENOUS AS NEEDED
Status: DISCONTINUED | OUTPATIENT
Start: 2021-03-01 | End: 2021-03-01

## 2021-03-01 RX ORDER — FENTANYL CITRATE/PF 50 MCG/ML
50 SYRINGE (ML) INJECTION
Status: DISCONTINUED | OUTPATIENT
Start: 2021-03-01 | End: 2021-03-01 | Stop reason: HOSPADM

## 2021-03-01 RX ORDER — PROPOFOL 10 MG/ML
INJECTION, EMULSION INTRAVENOUS AS NEEDED
Status: DISCONTINUED | OUTPATIENT
Start: 2021-03-01 | End: 2021-03-01

## 2021-03-01 RX ORDER — INSULIN GLARGINE 100 [IU]/ML
15 INJECTION, SOLUTION SUBCUTANEOUS
Status: DISCONTINUED | OUTPATIENT
Start: 2021-03-01 | End: 2021-03-02

## 2021-03-01 RX ORDER — FENTANYL CITRATE 50 UG/ML
INJECTION, SOLUTION INTRAMUSCULAR; INTRAVENOUS AS NEEDED
Status: DISCONTINUED | OUTPATIENT
Start: 2021-03-01 | End: 2021-03-01

## 2021-03-01 RX ORDER — ONDANSETRON 2 MG/ML
INJECTION INTRAMUSCULAR; INTRAVENOUS AS NEEDED
Status: DISCONTINUED | OUTPATIENT
Start: 2021-03-01 | End: 2021-03-01

## 2021-03-01 RX ORDER — ONDANSETRON 2 MG/ML
4 INJECTION INTRAMUSCULAR; INTRAVENOUS ONCE AS NEEDED
Status: DISCONTINUED | OUTPATIENT
Start: 2021-03-01 | End: 2021-03-01 | Stop reason: HOSPADM

## 2021-03-01 RX ORDER — GLYCINE 1.5 G/100ML
SOLUTION IRRIGATION AS NEEDED
Status: DISCONTINUED | OUTPATIENT
Start: 2021-03-01 | End: 2021-03-01 | Stop reason: HOSPADM

## 2021-03-01 RX ORDER — ATROPA BELLADONNA AND OPIUM 16.2; 3 MG/1; MG/1
SUPPOSITORY RECTAL AS NEEDED
Status: DISCONTINUED | OUTPATIENT
Start: 2021-03-01 | End: 2021-03-01 | Stop reason: HOSPADM

## 2021-03-01 RX ORDER — LIDOCAINE HYDROCHLORIDE 10 MG/ML
INJECTION, SOLUTION EPIDURAL; INFILTRATION; INTRACAUDAL; PERINEURAL AS NEEDED
Status: DISCONTINUED | OUTPATIENT
Start: 2021-03-01 | End: 2021-03-01

## 2021-03-01 RX ADMIN — CEFEPIME HYDROCHLORIDE 2000 MG: 2 INJECTION, POWDER, FOR SOLUTION INTRAVENOUS at 10:31

## 2021-03-01 RX ADMIN — OXYCODONE HYDROCHLORIDE 5 MG: 5 TABLET ORAL at 04:43

## 2021-03-01 RX ADMIN — ONDANSETRON 4 MG: 2 INJECTION INTRAMUSCULAR; INTRAVENOUS at 10:44

## 2021-03-01 RX ADMIN — ACETAMINOPHEN 975 MG: 325 TABLET ORAL at 22:28

## 2021-03-01 RX ADMIN — FENTANYL CITRATE 50 MCG: 50 INJECTION INTRAMUSCULAR; INTRAVENOUS at 10:41

## 2021-03-01 RX ADMIN — SODIUM CHLORIDE 125 ML/HR: 0.9 INJECTION, SOLUTION INTRAVENOUS at 20:31

## 2021-03-01 RX ADMIN — LIDOCAINE HYDROCHLORIDE 30 MG: 10 INJECTION, SOLUTION EPIDURAL; INFILTRATION; INTRACAUDAL; PERINEURAL at 10:41

## 2021-03-01 RX ADMIN — FENTANYL CITRATE 25 MCG: 50 INJECTION INTRAMUSCULAR; INTRAVENOUS at 11:00

## 2021-03-01 RX ADMIN — INFLUENZA VIRUS VACCINE 0.5 ML: 15; 15; 15; 15 SUSPENSION INTRAMUSCULAR at 15:05

## 2021-03-01 RX ADMIN — PHENYLEPHRINE HYDROCHLORIDE 400 MCG: 10 INJECTION INTRAVENOUS at 10:41

## 2021-03-01 RX ADMIN — PHENYLEPHRINE HYDROCHLORIDE 20 MCG/MIN: 10 INJECTION INTRAVENOUS at 10:57

## 2021-03-01 RX ADMIN — SODIUM CHLORIDE 125 ML/HR: 0.9 INJECTION, SOLUTION INTRAVENOUS at 12:16

## 2021-03-01 RX ADMIN — ACETAMINOPHEN 975 MG: 325 TABLET ORAL at 13:24

## 2021-03-01 RX ADMIN — SODIUM CHLORIDE 125 ML/HR: 0.9 INJECTION, SOLUTION INTRAVENOUS at 02:33

## 2021-03-01 RX ADMIN — OXYCODONE HYDROCHLORIDE 5 MG: 5 TABLET ORAL at 13:23

## 2021-03-01 RX ADMIN — INSULIN LISPRO 1 UNITS: 100 INJECTION, SOLUTION INTRAVENOUS; SUBCUTANEOUS at 22:33

## 2021-03-01 RX ADMIN — ACETAMINOPHEN 975 MG: 325 TABLET ORAL at 05:09

## 2021-03-01 RX ADMIN — PROPOFOL 120 MG: 10 INJECTION, EMULSION INTRAVENOUS at 10:41

## 2021-03-01 RX ADMIN — MIDAZOLAM 1 MG: 1 INJECTION INTRAMUSCULAR; INTRAVENOUS at 10:35

## 2021-03-01 RX ADMIN — FENTANYL CITRATE 25 MCG: 50 INJECTION INTRAMUSCULAR; INTRAVENOUS at 11:13

## 2021-03-01 RX ADMIN — Medication 50 MCG: at 11:44

## 2021-03-01 RX ADMIN — MIDAZOLAM 1 MG: 1 INJECTION INTRAMUSCULAR; INTRAVENOUS at 10:38

## 2021-03-01 RX ADMIN — INSULIN GLARGINE 15 UNITS: 100 INJECTION, SOLUTION SUBCUTANEOUS at 22:27

## 2021-03-01 RX ADMIN — SODIUM CHLORIDE: 0.9 INJECTION, SOLUTION INTRAVENOUS at 10:39

## 2021-03-01 RX ADMIN — HEPARIN SODIUM 5000 UNITS: 5000 INJECTION INTRAVENOUS; SUBCUTANEOUS at 13:28

## 2021-03-01 RX ADMIN — PHENYLEPHRINE HYDROCHLORIDE 100 MCG: 10 INJECTION INTRAVENOUS at 10:53

## 2021-03-01 RX ADMIN — INSULIN LISPRO 5 UNITS: 100 INJECTION, SOLUTION INTRAVENOUS; SUBCUTANEOUS at 17:22

## 2021-03-01 RX ADMIN — SODIUM CHLORIDE 1.5 UNITS/HR: 9 INJECTION, SOLUTION INTRAVENOUS at 10:01

## 2021-03-01 RX ADMIN — HEPARIN SODIUM 5000 UNITS: 5000 INJECTION INTRAVENOUS; SUBCUTANEOUS at 22:27

## 2021-03-01 NOTE — OP NOTE
OPERATIVE REPORT  PATIENT NAME: Mary Arias    :  1975  MRN: [id number]  Pt Location: [facility] 01    SURGERY DATE: 3/1/2021    Surgeon(s) and Role:     * Shavon Erickson MD - Primary    Preop Diagnosis:  Abscess of prostate [N41 2]    Post-Op Diagnosis Codes:     * Abscess of prostate [N41 2]    Procedure(s) (LRB):  CYSTOSCOPY (N/A)  TRANSURETHRAL RESECTION OF URETHERAL PROSTATE ABSCESS(TURP) (N/A)    Specimen(s):  ID Type Source Tests Collected by Time Destination   1 : prostate chips Tissue Prostate TISSUE EXAM Shavno Erickson MD 3/1/2021 1116    A :  Urine Urinary Bladder URINE CULTURE Shavon Erickson MD 3/1/2021 1100        Estimated Blood Loss:   2 mL    Drains:  Urethral Catheter Three way;Coude 24 Fr  (Active)   Number of days: 0       Anesthesia Type:   General    Operative Indications:  Abscess of prostate [N41 2]      Operative Findings:  1  Resection of the prosthetic urethra at the 5 and 7 o'clock position demonstrated 2 separate abscess cavities as demonstrated on the CT scan with expression of purulence  2  Urine culture sent after resection  3  Prostate chips sent for pathology  4  Digital rectal exam demonstrated an indurated prostate with no bogginess or concerns for acute prostatitis  5  [de-identified] Western Cecilia coude tip 3 way Matthews catheter placed in the patient's bladder with the inflow port clamped given relatively clear urine, BNO suppository placed at completion of surgery    Complications:   None    Procedure and Technique:  Mary Arias is a 40 y o  male with poorly controlled diabetes and presentation with diabetic ketoacidosis  Patient was found to have bilobar prostatic abscesses and a potential infected renal cyst     The patient was counseled regarding their options and ultimately opted to proceed with cystoscopy with trans urethral resection of the prostate and unroofing of prostatic abscess   Risks and benefits of the procedure were described and the patient signed an informed consent  On 3/1/2021 , the patient proceeded to the operating room  They were laid supine on the operating room table and a pre-procedure time out was performed with all parties present and in agreement of the procedure planned and laterality  Patient received intravenous antibiotics in the form of vancomycin and cephalosporin on the floor was given a dose of cefepime in the operating room and sequential compression devices were placed on bilateral lower extremities  They were then induced with a LMA anesthetic  Patient placed in dorsal lithotomy with care to pad all pressure points  Perineum and genitalia were prepped and draped  Continuous flow resectoscope sheath was entered into the patient's urethra and bladder, once inside the prosthetic urethra, we evaluated for any cystic areas of concern  There were none  We identified the verumontanum and bladder neck  Once inside the bladder, pan cystoscopy was performed at 360°  No diverticular lesions or stones  Carefully identified the ureteral orifices in the orthotopic location  Monopolar resection of the bladder neck and prostate was performed to the area of the verumontanum  Five and 7 o'clock position, we unroofed the bilateral abscesses drained purulent material   Continued resection was performed until these areas were widely open and draining  All prostate tissue was removed and sent off as prostate chips  Urine culture was sent after resection to evaluate the drained collections  Fulguration was performed of the prostatic urethra to control any bleeding and hemostasis  Careful inspection of the bladder was performed the ureteral orifices were unharmed  Bladder was left full the resectoscope was removed  Twenty-four Western Cecilia 3 way coude tip hematuria catheter was placed into the bladder with 30 cc in the balloon  This was irrigated and was noted to be light pink    It was hooked to gravity drainage the inflow port was clamped  Belladonna and opium suppositories placed per rectum a careful rectal exam was performed  At the completion of the procedure, the patient was extubated and transferred to PACU in good condition        I was present for the entire procedure    Patient Disposition:  PACU     SIGNATURE: Calli Rooney MD  DATE: March 1, 2021  TIME: 11:30 AM

## 2021-03-01 NOTE — ASSESSMENT & PLAN NOTE
Lab Results   Component Value Date    HGBA1C >14 0 (H) 03/01/2021       Recent Labs     03/01/21  0412 03/01/21  0615 03/01/21  0819 03/01/21  0959   POCGLU 128 134 112 116     Uncontrolled with most recent A1c of greater than than 14%  The goal is less than 7%  Home regimen:  Basaglar 10 units q h s  And metformin 2000 mg twice daily however he is not compliant with his regimen  Currently on Lantus 28 units subcutaneously at bedtime and Humalog 10 units with meals 3 times a day plus correctional sliding scale insulin   However patient blood sugars are above target goal for admitted patient  Will increase Lantus to 30  and Humalog to 12 and continue correctional sliding scale  Continue to monitor blood sugar over time and make adjustments to the regimen if necessary

## 2021-03-01 NOTE — PROGRESS NOTES
UROLOGY PROGRESS NOTE   Patient Identifiers: Rhett Conway (MRN [id number])  Date of Service: 3/1/2021        Assessment:   61-year-old male with uncontrolled diabetes, diabetic ketoacidosis, prostate abscess and likely infected left renal cyst     Plan:     Appreciate medical optimization  Anion gap has closed and electrolytes are within normal limits  Patient's vitals are stable  Has been cleared to proceed to the operating room today  Discussed with patient plan for cystoscopy with transurethral resection or unroofing of the prosthetic abscess  Risks and benefits discussed and described including ejaculatory changes and incontinence  Patient understands these risks  We will continue to follow for worsening sepsis and consider IR drainage of the left cyst if needed  Subjective:     24 HR EVENTS:   no significant events  Patient has  no complaints        Objective:     VITALS:    Vitals:    03/01/21 0700   BP: 120/71   Pulse: 93   Resp: 18   Temp: 99 9 °F (37 7 °C)   SpO2: 96%       INS & OUTS:  2050cc UOP    LABS:  Lab Results   Component Value Date    HGB 11 8 (L) 03/01/2021    HCT 35 1 (L) 03/01/2021    WBC 23 43 (H) 03/01/2021     (H) 03/01/2021   ]    Lab Results   Component Value Date    K 3 5 03/01/2021     03/01/2021    CO2 31 03/01/2021    BUN 7 03/01/2021    CREATININE 0 41 (L) 03/01/2021    CALCIUM 8 7 03/01/2021   ]    INPATIENT MEDS:    Current Facility-Administered Medications:     [MAR Hold] acetaminophen (TYLENOL) tablet 975 mg, 975 mg, Oral, Q8H [address], Marvelyn Bamberger, DO, 975 mg at 03/01/21 0509    [MAR Hold] cefepime (MAXIPIME) 2 g/50 mL dextrose IVPB, 2,000 mg, Intravenous, Q12H, Marvelyn Bamberger, DO    [facility] Hold] heparin (porcine) subcutaneous injection 5,000 Units, 5,000 Units, Subcutaneous, Q8H [address], Stopped at 03/01/21 0600 **AND** [COMPLETED] Platelet count, , , Once, Marvelyn Bamberger, DO    [facility] Hold] HYDROmorphone (DILAUDID) injection 1 mg, 1 mg, Intravenous, Q4H PRN, Rafael Veras DO    [facility] Hold] influenza vaccine, quadrivalent (FLUARIX) IM injection 0 5 mL, 0 5 mL, Intramuscular, Once, Rafael Veras DO    insulin regular (HumuLIN R,NovoLIN R) 1 Units/mL in sodium chloride 0 9 % 100 mL infusion, 0 3-21 Units/hr, Intravenous, Titrated, Rafael Veras DO, Last Rate: 1 5 mL/hr at 03/01/21 1001, 1 5 Units/hr at 03/01/21 1001    [MAR Hold] oxyCODONE (ROXICODONE) IR tablet 5 mg, 5 mg, Oral, Q4H PRN, Rafael Veras DO, 5 mg at 03/01/21 0443    sodium chloride 0 9 % infusion, 125 mL/hr, Intravenous, Continuous, Rafael Veras DO, Last Rate: 125 mL/hr at 03/01/21 0233, 125 mL/hr at 03/01/21 0233      Physical Exam:   /71 (BP Location: Left arm)   Pulse 93   Temp 99 9 °F (37 7 °C) (Oral)   Resp 18   Ht 5' 5" (1 651 m)   Wt 52 2 kg (115 lb)   SpO2 96%   BMI 19 14 kg/m²   GEN: No acute distress  RESP: breathing comfortably with no accessory muscle use  CV: no significant peripheral edema  ABD: soft, non-tender, non-distended  EISENBERG: Clear purulent urine

## 2021-03-01 NOTE — ANESTHESIA POSTPROCEDURE EVALUATION
Post-Op Assessment Note    CV Status:  Stable    Pain management: satisfactory to patient     Mental Status:  Alert and awake   Hydration Status:  Euvolemic   PONV Controlled:  Controlled   Airway Patency:  Patent      Post Op Vitals Reviewed: Yes      Staff: CRNA         No complications documented      BP   119/77   Temp   97 7   Pulse  88   Resp   10   SpO2   100 4L

## 2021-03-01 NOTE — ASSESSMENT & PLAN NOTE
Lab Results   Component Value Date    HGBA1C >14 0 (H) 03/01/2021       Recent Labs     03/01/21  0220 03/01/21  0412 03/01/21  0615 03/01/21  0819   POCGLU 133 128 134 112       Blood Sugar Average: Last 72 hrs:  (P) 229 [id number]   Poorly-controlled  Noncompliance  Continue with insulin drip for now  Check A1c  Consult endocrine for further management

## 2021-03-01 NOTE — CASE MANAGEMENT
LOS: 1 day  Bundle: no  Readmission: no    Explained role of CM to pt  CM obtained the following information from pt  Home:Lives in an apartment with a room mate  There is one flight of stairs to enter apartment  DME: Denies  Ambulation: Independent  Drives: yes  Pharmacy: [facility] [place]  PCP: [facility] History: depression  Pt denies ever having inpatient Psychiatric treatment  Substance Use/Abuse History: denies  [facility] History: denies  Work/Retired: unemployed  Rehab History: denies  POA/LW: No  Cm provided pt with information on POA and LW  Transport at [employer]:  daughter Eran Jasso     CM reviewed d/c planning process including the following: identifying help at home, patient preference for d/c planning needs, [facility] Meds to Bed program     CM asked pt who he would want to make decisions for him if he was unable to make decisions for himself  He did not want to answer  CM asked pt if he would like CM to call anyone for him, he declined  Cm team will continue to follow through discharge

## 2021-03-01 NOTE — UTILIZATION REVIEW
Notification of Inpatient Admission/Inpatient Authorization Request   This is a Notification of Inpatient Admission for [address]  Be advised that this patient was admitted to our facility under Inpatient Status  Contact Scot Client at [phone] for additional admission information  [facility] DEPT  DEDICATED -[phone]  Patient Name:   Shailesh Mcmillan   YOB: 1975       [address]   [address]:   [place]  Tax ID: [id number]  NPI: [id number] Attending Provider/NPI:  Phone:  Address: Nikolas Lang [[id number]]  [phone]  Same as Facility   Place of Service Code: 24 Place of Service Name:  [address] [place] [address]   Start Date: 2/28/21 1545 Discharge Date & Time: No discharge date for patient encounter  Type of Admission: Inpatient Status Discharge Disposition (if discharged): Home/Self Care   Patient Diagnoses: Abscess of prostate [N41 2]  Back pain [M54 9]  DKA (diabetic ketoacidoses) ([address] ) [E11 10]  Pyelonephritis [N12]  Sepsis ([address] ) [A41 9]  Type 2 diabetes mellitus with hyperglycemia, with long-term current use of insulin ([address] ) [E11 65, Z79 4]     Orders: Admission Orders (From admission, onward)     Ordered        02/28/21 1545  Inpatient Admission  Once                    Assigned Utilization Review Contact: Scot Client  Utilization ,   Network Utilization Review Department  Phone: [phone]; Fax [phone]   Email: [email or web address]   ATTENTION PAYERS: Please call the assigned Utilization  directly with any questions or concerns ALL voicemails in the department are confidential  Send all requests for admission clinical reviews, approved or denied determinations and any other requests to dedicated fax number belonging to the campus where the patient is receiving treatment

## 2021-03-01 NOTE — PROGRESS NOTES
Progress Note - Nito Gomez 1975, 40 y o  male MRN: [id number]    Unit/Bed#: [facility] 522-01 Encounter: [id number]    Primary Care Provider: Milagros Torres DO   Date and time admitted to hospital: 2/28/2021 11:44 AM        * Pyelonephritis  Assessment & Plan  Patient with underlying poorly-controlled diabetes, poor insulin compliance, presented with bilateral back pain, fatigue, dysuria, and hematuria    CT scan with Bilateral pyelonephritis with suspected superinfected infected cyst exophytic from the left lower pole measuring 4 9 x 2 5 cm  Small enhancing abscesses in both lobes of the prostate gland with adjacent cystitis, findings consistent with complicated prostatitis with secondary ascending infection      Abnormal urinalysis  Start broad-spectrum IV antibiotic  Urology is planning for cystoscopy today  Follow on urine and blood cultures  Pain control    Hyponatremia  Assessment & Plan  Secondary to above and decreasing oral intake  Corrected sodium for hyperglycemia is 134 7  Continue with IV fluid for hydration  Continue to monitor  3/1-resolved    Tobacco abuse  Assessment & Plan  Patient refusing nicotine patch    Type 2 diabetes mellitus with hyperglycemia, with long-term current use of insulin [facility])  Assessment & Plan  Lab Results   Component Value Date    HGBA1C >14 0 (H) 03/01/2021       Recent Labs     03/01/21  0220 03/01/21  0412 03/01/21  0615 03/01/21  0819   POCGLU 133 128 134 112       Blood Sugar Average: Last 72 hrs:  (P) 229 [id number]   Poorly-controlled  Noncompliance  Continue with insulin drip for now  Check A1c  Consult endocrine for further management    Sepsis [facility])  Assessment & Plan  POA, secondary to above  Patient presented with fever, tachycardia, leukocytosis  Continue with IV antibiotic and IV fluid  Continue with above treatment  Monitor    Prostate abscess  Assessment & Plan  Management as above      VTE Pharmacologic Prophylaxis:   Pharmacologic: Heparin  Mechanical VTE Prophylaxis in Place: Yes    Patient Centered Rounds: I have performed bedside rounds with nursing staff today  Discussions with Specialists or Other Care Team Provider:  Urology    Education and Discussions with Family / Patient: Care plan discussed with patient who voiced understanding and agrees with recommendations  Time Spent for Care: 30 minutes  More than 50% of total time spent on counseling and coordination of care as described above  Current Length of Stay: 1 day(s)    Current Patient Status: Inpatient   Certification Statement: The patient will continue to require additional inpatient hospital stay due to Treatment of bilateral hydronephrosis and prostatitis    Discharge Plan: To be determined    Code Status: Level 1 - Full Code      Subjective:   Patient seen examined bedside, no acute distress noted  States that overall he feels better and labs and vitals stable  Hyponatremia has resolved with blood glucose within normal limits today  Kidney function improving and white count trending down  Patient started on broad-spectrum antibiotics and will continue that until appropriate source control is achieved via surgery; likely narrow antibiotic therapy tomorrow based on blood and urine cultures  Blood glucose controlled by Endocrinology will likely start on long-acting insulin following surgery  Patient with intermediate predictors going for low risk procedure with > 4 METs physical capacity; appropriate to proceed with procedure at this time period ECG with no ischemic findings; patient denies chest pain or shortness of breath  Objective:     Vitals:   Temp (24hrs), Av 3 °F (37 4 °C), Min:98 °F (36 7 °C), Max:101 1 °F (38 4 °C)    Temp:  [98 °F (36 7 °C)-101 1 °F (38 4 °C)] 99 9 °F (37 7 °C)  HR:  [] 93  Resp:  [16-24] 18  BP: (116-164)/(71-91) 120/71  SpO2:  [96 %-99 %] 96 %  Body mass index is 19 14 kg/m²       Input and Output Summary (last 24 hours): Intake/Output Summary (Last 24 hours) at 3/1/2021 1007  Last data filed at 3/1/2021 0900  Gross per 24 hour   Intake 5078 34 ml   Output 2500 ml   Net 2578 34 ml       Physical Exam:     Physical Exam  Vitals signs and nursing note reviewed  Constitutional:       Appearance: He is well-developed  HENT:      Head: Normocephalic and atraumatic  Eyes:      Conjunctiva/sclera: Conjunctivae normal    Neck:      Musculoskeletal: Neck supple  Cardiovascular:      Rate and Rhythm: Normal rate and regular rhythm  Heart sounds: No murmur  Pulmonary:      Effort: Pulmonary effort is normal  No respiratory distress  Breath sounds: Normal breath sounds  Abdominal:      Palpations: Abdomen is soft  Tenderness: There is no abdominal tenderness  Genitourinary:     Comments: Bilateral CVA tenderness  Skin:     General: Skin is warm and dry  Neurological:      Mental Status: He is alert  Additional Data:     Labs:    Results from last 7 days   Lab Units 03/01/21  0445  02/28/21  1234   WBC Thousand/uL 23 43*  --  27 00*   HEMOGLOBIN g/dL 11 8*  --  14 5   HEMATOCRIT % 35 1*  --  41 7   PLATELETS Thousands/uL 483*   < > 562*   BANDS PCT %  --   --  3   NEUTROS PCT % 75  --   --    LYMPHS PCT % 11*  --   --    LYMPHO PCT %  --   --  2*   MONOS PCT % 12  --   --    MONO PCT %  --   --  8   EOS PCT % 0  --  2    < > = values in this interval not displayed       Results from last 7 days   Lab Units 03/01/21  0819 03/01/21  0445   SODIUM mmol/L 139 137   POTASSIUM mmol/L 3 5 3 8   CHLORIDE mmol/L 105 103   CO2 mmol/L 31 29   BUN mg/dL 7 8   CREATININE mg/dL 0 41* 0 42*   ANION GAP mmol/L 3* 5   CALCIUM mg/dL 8 7 9 1   ALBUMIN g/dL  --  2 0*   TOTAL BILIRUBIN mg/dL  --  0 16*   ALK PHOS U/L  --  157*   ALT U/L  --  <6*   AST U/L  --  7   GLUCOSE RANDOM mg/dL 128 124     Results from last 7 days   Lab Units 02/28/21  1234   INR  1 02     Results from last 7 days   Lab Units 03/01/21  0819 03/01/21  0615 03/01/21  0535 03/01/21  0220 03/01/21  0006 02/28/21  2213 02/28/21  1958 02/28/21  1758 02/28/21  1610 02/28/21  1150   POC GLUCOSE mg/dl 112 134 128 133 237* 294* 376* 320* 334* >500*     Results from last 7 days   Lab Units 03/01/21  0445   HEMOGLOBIN A1C % >14 0*     Results from last 7 days   Lab Units 03/01/21  0024 02/28/21  1234   LACTIC ACID mmol/L  --  1 5   PROCALCITONIN ng/ml 0 50* 0 35*           * I Have Reviewed All Lab Data Listed Above  * Additional Pertinent Lab Tests Reviewed: All Labs Within Last 24 Hours Reviewed    Imaging:    Imaging Reports Reviewed Today Include:  CT abdomen pelvis  Imaging Personally Reviewed by Myself Includes:  None    Recent Cultures (last 7 days):     Results from last 7 days   Lab Units 02/28/21  1235 02/28/21  1234   BLOOD CULTURE  Received in Microbiology Lab  Culture in Progress  Received in Microbiology Lab  Culture in Progress  Last 24 Hours Medication List:   Current Facility-Administered Medications   Medication Dose Route Frequency Provider Last Rate    acetaminophen  975 mg Oral [facility] Jeralyn Ricardos, DO      cefepime  2,000 mg Intravenous Q12H Bakarialyn Ricardos, DO      heparin (porcine)  5,000 Units Subcutaneous [facility] Jeralyjo Silvas, DO      HYDROmorphone  1 mg Intravenous Q4H PRN Jeralyn Chiles, DO      influenza vaccine  0 5 mL Intramuscular Once Jeralyn Ricardos, DO      insulin regular (HumuLIN R,NovoLIN R) infusion  0 3-21 Units/hr Intravenous Titrated Jeralyn Chiles, DO 1 5 Units/hr (03/01/21 3561)    oxyCODONE  5 mg Oral Q4H PRN Jeralyn Chiles, DO      sodium chloride  125 mL/hr Intravenous Continuous Jeralyn Chiles,  mL/hr (03/01/21 1445)        Today, Patient Was Seen By: Mirela Shields MD    ** Please Note: Dictation voice to text software may have been used in the creation of this document   **

## 2021-03-01 NOTE — ASSESSMENT & PLAN NOTE
Secondary to above and decreasing oral intake  Corrected sodium for hyperglycemia is 134 7  Continue with IV fluid for hydration  Continue to monitor  3/1-resolved

## 2021-03-01 NOTE — ASSESSMENT & PLAN NOTE
Patient with underlying poorly-controlled diabetes, poor insulin compliance, presented with bilateral back pain, fatigue, dysuria, and hematuria    CT scan with Bilateral pyelonephritis with suspected superinfected infected cyst exophytic from the left lower pole measuring 4 9 x 2 5 cm  Small enhancing abscesses in both lobes of the prostate gland with adjacent cystitis, findings consistent with complicated prostatitis with secondary ascending infection      Abnormal urinalysis  Start broad-spectrum IV antibiotic  Urology is planning for cystoscopy today  Follow on urine and blood cultures  Pain control

## 2021-03-01 NOTE — ANESTHESIA PREPROCEDURE EVALUATION
Procedure:  CYSTOSCOPY (N/A Bladder)  TRANSURETHRAL RESECTION OF PROSTATE (TURP) (N/A Abdomen)    Relevant Problems   ENDO   (+) Type 2 diabetes mellitus with hyperglycemia, with long-term current use of insulin (HCC)      /RENAL   (+) Prostate abscess        Physical Exam    Airway    Mallampati score: II  TM Distance: >3 FB  Neck ROM: full     Dental   upper dentures and lower dentures,     Cardiovascular  Rhythm: regular, Rate: normal, No murmur,     Pulmonary  Breath sounds clear to auscultation, No rhonchi, No wheezes,     Other Findings        Anesthesia Plan  ASA Score- 4     Anesthesia Type- general with ASA Monitors  Additional Monitors:   Airway Plan: ETT and LMA  Plan Factors-Exercise tolerance (METS): >4 METS  Chart reviewed  EKG reviewed  Imaging results reviewed  Existing labs reviewed  Patient summary reviewed  Induction- intravenous  Postoperative Plan- Plan for postoperative opioid use  Planned trial extubation    Informed Consent- Anesthetic plan and risks discussed with patient  I personally reviewed this patient with the CRNA  Discussed and agreed on the Anesthesia Plan with the CRNA  Nicholas Girard

## 2021-03-01 NOTE — UTILIZATION REVIEW
Initial Clinical Review    Admission: Date/Time/Statement:   Admission Orders (From admission, onward)     Ordered        02/28/21 1545  Inpatient Admission  Once                   Orders Placed This Encounter   Procedures    Inpatient Admission     Standing Status:   Standing     Number of Occurrences:   1     Order Specific Question:   Level of Care     Answer:   Level 2 Stepdown / HOT [14]     Order Specific Question:   Estimated length of stay     Answer:   More than 2 Midnights     Order Specific Question:   Certification     Answer:   I certify that inpatient services are medically necessary for this patient for a duration of greater than two midnights  See H&P and MD Progress Notes for additional information about the patient's course of treatment  ED Arrival Information     Expected Arrival Acuity Means of Arrival Escorted By Service Admission Type    - 2/28/2021 11:40 Emergent Walk-In Self General Medicine Emergency    Arrival Complaint    hyperglycemic        Chief Complaint   Patient presents with    Back Pain     Pt c/o diffuse back pain, increased urination, fatigue and insomnia x2 weeks  Assessment/Plan:  41 yo m with a pmh of depression and  poorly controlled DM on insulin  He presents to the Ed with complaints of bilateral back pain and abdominal pain the past couple weeks  He reports dysuria, urinary frequency, weakness, fatigue and hematuria with a clot  He was found to be febrile, tachy with significant leukocytosis and hyperglycemia ( 547), hyponatremia  CT showed b/l pyelonephritis and a prostate abscess  He admits to not taking his medications as prescribed  He is admitted inpatient for pyelonephritis, and prostate abscess, Sepsis, hyponatremia and DKA  Urology - 2/28 -  41 yo m in DKA with prostate abscess and super infected renal cyst  Plan for transurethral resection of his prostate and unroofing of the prostate abscess  3/1 Pt cleared  To proceed with OR, cysto and TURP  Plan to continue to follow  For worsening sepis and to consider IR drainage of the left cyst if needed  OP report  3/1 -  Procedure:CYSTOSCOPY (N/A)  TRANSURETHRAL RESECTION OF URETHERAL PROSTATE ABSCESS(TURP) (N/A)   Operative Findings:  1  Resection of the prosthetic urethra at the 5 and 7 o'clock position demonstrated 2 separate abscess cavities as demonstrated on the CT scan with expression of purulence  2  Urine culture sent after resection  3  Prostate chips sent for pathology  4  Digital rectal exam demonstrated an indurated prostate with no bogginess or concerns for acute prostatitis  5  [de-identified] [place] Ceciila coude tip 3 way Matthews catheter placed in the patient's bladder with the inflow port clamped given relatively clear urine, BNO suppository placed at completion of surgery    3/1 -  Pt in no acute distress noted  Hyponatremia resolved,  blood glucose WNL, Kidney function improved , WBC count trending down  Pt on a broad - spectrum antibiotics, plan to continue until appropriate source control is achieved via surgery,  With a plan to narrow therapy based on blood and urine cultures        ED Triage Vitals   Temperature Pulse Respirations Blood Pressure SpO2   02/28/21 1159 02/28/21 1150 02/28/21 1150 02/28/21 1150 02/28/21 1150   (!) 101 1 °F (38 4 °C) (!) 125 20 137/91 99 %      Temp Source Heart Rate Source Patient Position - Orthostatic VS BP Location FiO2 (%)   02/28/21 1159 02/28/21 1150 02/28/21 1150 02/28/21 1150 --   Oral Monitor Lying Left arm       Pain Score       02/28/21 1150       Worst Possible Pain          Wt Readings from Last 1 Encounters:   02/28/21 52 2 kg (115 lb)     Additional Vital Signs:   Date/Time  Temp  Pulse  Resp  BP  MAP (mmHg)  SpO2  O2 Device  Cardiac (WDL)  Patient Position - Orthostatic VS   03/01/21 1230  99 1 °F (37 3 °C)  104  18  144/66  --  96 %  None (Room air)  --  Lying   03/01/21 1200  98 °F (36 7 °C)  92  16  131/71  --  94 %  None (Room air)  WDL  -- 03/01/21 1145  --  100  16  153/91  --  96 %  --  --  --   03/01/21 1130  97 7 °F (36 5 °C)  90  20  126/83  --  94 %  None (Room air)  WDL  --         Date/Time  Temp  Pulse  Resp  BP  MAP (mmHg)  SpO2  O2 Device  Patient Position - Orthostatic VS   03/01/21 0700  99 9 °F (37 7 °C)  93  18  120/71  --  96 %  None (Room air)  Lying   02/28/21 2300  98 °F (36 7 °C)  104  18  134/81  --  97 %  None (Room air)  Lying   02/28/21 1810  --  --  --  --  --  --  None (Room air)  --   02/28/21 1732  98 9 °F (37 2 °C)  90  16  116/72  89  --  --  Sitting   02/28/21 1500  --  103  23Abnormal   129/77  97  98 %  None (Room air)  --   02/28/21 1415  98 8 °F (37 1 °C)  107Abnormal   21  --  --  97 %  None (Room air)  --   02/28/21 1300  --  120Abnormal   16  150/81  109  97 %  None (Room air)  Lying   02/28/21 1245  --  122Abnormal   24Abnormal   164/90  118  97 %  None (Room air)  Lying         Pertinent Labs/Diagnostic Test Results:   Results from last 7 days   Lab Units 02/28/21  1234   SARS-COV-2  Negative     Results from last 7 days   Lab Units 03/01/21  0445 03/01/21  0018 02/28/21  1234   WBC Thousand/uL 23 43*  --  27 00*   HEMOGLOBIN g/dL 11 8*  --  14 5   HEMATOCRIT % 35 1*  --  41 7   PLATELETS Thousands/uL 483* 405* 562*   NEUTROS ABS Thousands/µL 17 51*  --   --    BANDS PCT %  --   --  3         Results from last 7 days   Lab Units 03/01/21  0819 03/01/21  0445 03/01/21  0018 02/28/21  1607 02/28/21  1234   SODIUM mmol/L 139 137 134* 127* 124*   POTASSIUM mmol/L 3 5 3 8 3 7 3 9 4 2   CHLORIDE mmol/L 105 103 99* 90* 85*   CO2 mmol/L 31 29 32 29 32   ANION GAP mmol/L 3* 5 3* 8 7   BUN mg/dL 7 8 10 17 19   CREATININE mg/dL 0 41* 0 42* 0 48* 0 68 0 86   EGFR ml/min/1 73sq m 142 141 133 115 105   CALCIUM mg/dL 8 7 9 1 8 4 8 4 9 6   MAGNESIUM mg/dL  --  1 9  --   --   --    PHOSPHORUS mg/dL  --  1 9*  --   --   --      Results from last 7 days   Lab Units 03/01/21  0445 02/28/21  1234   AST U/L 7 5   ALT U/L <6* 9* ALK PHOS U/L 157* 148*   TOTAL PROTEIN g/dL 6 2* 7 6   ALBUMIN g/dL 2 0* 2 7*   TOTAL BILIRUBIN mg/dL 0 16* 0 68     Results from last 7 days   Lab Units 03/01/21  0819 03/01/21  0615 03/01/21  0412 03/01/21  0220 03/01/21  0006 02/28/21  2213 02/28/21  1958 02/28/21  1758 02/28/21  1610 02/28/21  1150   POC GLUCOSE mg/dl 112 134 128 133 237* 294* 376* 320* 334* >500*     Results from last 7 days   Lab Units 03/01/21  0819 03/01/21  0445 03/01/21  0018 02/28/21  1607 02/28/21  1234   GLUCOSE RANDOM mg/dL 128 124 228* 319* 547*         Results from last 7 days   Lab Units 03/01/21  0445   HEMOGLOBIN A1C % >14 0*   EAG mg/dl >355     BETA-HYDROXYBUTYRATE   Date Value Ref Range Status   02/28/2021 1 7 (H) <0 6 mmol/L Final      Results from last 7 days   Lab Units 02/28/21  1234   PROTIME seconds 13 4   INR  1 02   PTT seconds 32     Results from last 7 days   Lab Units 03/01/21  0024 02/28/21  1234   PROCALCITONIN ng/ml 0 50* 0 35*     Results from last 7 days   Lab Units 02/28/21  1234   LACTIC ACID mmol/L 1 5       Results from last 7 days   Lab Units 02/28/21  1244   CLARITY UA  Clear   COLOR UA  Yellow   SPEC GRAV UA  <=1 005   PH UA  6 0   GLUCOSE UA mg/dl >=1000 (1%)*   KETONES UA mg/dl 40 (2+)*   BLOOD UA  Small*   PROTEIN UA mg/dl Negative   NITRITE UA  Positive*   BILIRUBIN UA  Negative   UROBILINOGEN UA E U /dl 0 2   LEUKOCYTES UA  Trace*   WBC UA /hpf 30-50*   RBC UA /hpf None Seen   BACTERIA UA /hpf Moderate*   EPITHELIAL CELLS WET PREP /hpf None Seen     Results from last 7 days   Lab Units 02/28/21  1234   INFLUENZA A PCR  Negative   INFLUENZA B PCR  Negative   RSV PCR  Negative     Results from last 7 days   Lab Units 02/28/21  1235 02/28/21  1234   BLOOD CULTURE  Received in Microbiology Lab  Culture in Progress  Received in Microbiology Lab  Culture in Progress         CT A & P - 2/28 - 1   Bilateral pyelonephritis with suspected superinfected infected cyst exophytic from the left lower pole measuring 4 9 x 2 5 cm  2   Small enhancing abscesses in both lobes of the prostate gland with adjacent cystitis, findings consistent with complicated prostatitis with secondary ascending infection  CXR 2/28 -  No acute    ECG  Collection Time Result Time Vent R Atrial R NV Int  QRSD Int  QT Int  QTC Int   P Axis QRS Axis T Wave Ax    02/28/21 11:59:06 02/28/21 22:13:37 118 118 138 86 302 423 69 96 39      Sinus tachycardia  - Rightward axis   Borderline ECG  - No previous ECGs available               ED Treatment:   Medication Administration from 02/28/2021 1139 to 02/28/2021 1723       Date/Time Order Dose Route Action Comments     02/28/2021 1351 cefTRIAXone (ROCEPHIN) 2,000 mg in dextrose 5 % 50 mL IVPB 2,000 mg Intravenous New Bag      02/28/2021 1240 multi-electrolyte (ISOLYTE-S PH 7 4) bolus 1,000 mL 1,000 mL Intravenous New Bag      02/28/2021 1240 acetaminophen (TYLENOL) tablet 975 mg 975 mg Oral Given      02/28/2021 1351 insulin regular (HumuLIN R,NovoLIN R) injection 10 Units 10 Units Intravenous Given      02/28/2021 1350 potassium chloride (K-DUR,KLOR-CON) CR tablet 40 mEq 40 mEq Oral Given      02/28/2021 1419 dextrose 5 % and sodium chloride 0 9 % infusion 0 mL/hr Intravenous Hold To start when pt blood sugar is less than 250 per protocol     02/28/2021 1611 insulin regular (HumuLIN R,NovoLIN R) 1 Units/mL in sodium chloride 0 9 % 100 mL infusion 2 6 Units/hr Intravenous Rate/Dose Change      02/28/2021 1416 insulin regular (HumuLIN R,NovoLIN R) 1 Units/mL in sodium chloride 0 9 % 100 mL infusion 5 2 Units/hr Intravenous New Bag      02/28/2021 1406 iohexol (OMNIPAQUE) 350 MG/ML injection (MULTI-DOSE) 100 mL 100 mL Intravenous Given      02/28/2021 1609 multi-electrolyte (ISOLYTE-S PH 7 4) bolus 1,000 mL 1,000 mL Intravenous New Bag      02/28/2021 1628 vancomycin (VANCOCIN) IVPB (premix in dextrose) 1,000 mg 200 mL 1,000 mg Intravenous New Bag      02/28/2021 1627 oxyCODONE (ROXICODONE) IR tablet 5 mg 5 mg Oral Given         Past Medical History:   Diagnosis Date    Diabetes mellitus ([email or web address] 75 )     type 2    Hypertension     Psychiatric disorder     depression     Present on Admission:  **None**      Admitting Diagnosis: Abscess of prostate [N41 2]  Back pain [M54 9]  DKA (diabetic ketoacidoses) ([facility]) [E11 10]  Pyelonephritis [N12]  [facility] ) [A41 9]  Type 2 diabetes mellitus with hyperglycemia, with long-term current use of insulin ([place] [address] ) [E11 65, Z79 4]  Age/Sex: 40 y o  male       Admission Orders:  Scheduled Medications:  acetaminophen, 975 mg, Oral, Q8H [facility] HOME  cefepime, 2,000 mg, Intravenous, Q12H  heparin (porcine), 5,000 Units, Subcutaneous, Q8H [facility]  influenza vaccine, 0 5 mL, Intramuscular, Once      Continuous IV Infusions:  insulin regular (HumuLIN R,NovoLIN R) infusion, 0 3-21 Units/hr, Intravenous, Titrated  sodium chloride, 125 mL/hr, Intravenous, Continuous      PRN Meds:  HYDROmorphone, 1 mg, Intravenous, Q4H PRN - 2/28 x 1   oxyCODONE, 5 mg, Oral, Q4H PRN - 2/28 x 2 - 3/1 x 2  B & O suppository - 3/1 x 1    Nursing Orders - VS - scd's to le's -  Up with assistance -  Diet NPO  Advanced to cons carb  Post op  On 3/1      Network Utilization Review Department  ATTENTION: Please call with any questions or concerns to [phone] and carefully listen to the prompts so that you are directed to the right person  All voicemails are confidential   Chapito Morris all requests for admission clinical reviews, approved or denied determinations and any other requests to dedicated fax number below belonging to the campus where the patient is receiving treatment   List of dedicated fax numbers for the Facilities:  [address] DENIALS (Administrative/Medical Necessity) [phone]   [address] [place] [address] (Maternity/NICU/Pediatrics) 261 [facility],[address]  200 [facility] [place] [address]) [address] [place] [address] Hipolito Castro 1481 [phone]   [place] [address] [phone]

## 2021-03-01 NOTE — CONSULTS
Consultation - Xin Stanley 40 y o  male MRN: [id number]    [facility] Encounter: [id number]      Assessment/Plan     Type 2 diabetes mellitus with hyperglycemia, with long-term current use of insulin [facility])  Assessment & Plan  Lab Results   Component Value Date    HGBA1C >14 0 (H) 03/01/2021       Recent Labs     03/01/21  0412 03/01/21  0615 03/01/21  0819 03/01/21  0959   POCGLU 128 134 112 116     Uncontrolled with most recent A1c of greater than than 14%  The goal is less than 7%  Home regimen:  Basaglar 10 units q h s  And metformin 2000 mg twice daily however he is not compliant with his regimen  Patient is not perform home glucose monitoring  Currently on insulin infusion drip  And patient is status post TURP procedure  He is taking p o  well  Recommend transitioning to subcutaneous basal bolus insulin tonight  start Lantus 15 units subcutaneously at bedtime   start Humalog 5 units with meals 3 times a day  start correctional sliding scale insulin   Discontinue IV insulin 1hr after 1st dose of Lantus  Continue to monitor blood sugar over time and make adjustments to the regimen if necessary  We will recommend to check C-peptide, anti islet antibody and MERLENE, to rule out ROBERTO, he has BMI of 19 14  Prostate abscess  Assessment & Plan  Management as per primary team    * Pyelonephritis  Assessment & Plan  Management as per primary team        CC: Diabetes Consult    History of Present Illness     HPI: Xin Stanley is a 40y o  year old male with type 2 diabetes, hypertension who presented with bilateral flank pain and dysuria and is admitted for complicated prostatitis and bilateral pyelonephritis  He reports 10 years history of type 2 diabetes, on Basaglar 10 units q h s  And metformin 2000 mg twice daily however he reports being wound compliant with his current regimen  Is not performing home glucose monitoring  His most recent A1c is more than 14%    He reports polyuria, polydipsia nocturia  He reports 10 lb weight loss in last 2 weeks, he reports polyneuropathy, however he denies retinopathy, CAD or stroke  On admission was found to be hyperglycemic at 547 mg/dL, with normal anion gap, started on insulin infusion drip, currently NPO for or prostate abscess  Inpatient consult to Endocrinology     Performed by  Greg Sebastian MD     Authorized by Jil Jones MD              Review of Systems   Constitutional: Positive for unexpected weight change  Negative for activity change, appetite change, chills, diaphoresis, fatigue and fever  HENT: Negative for congestion, drooling, ear discharge, ear pain, trouble swallowing and voice change  Eyes: Negative for photophobia, pain, discharge, redness, itching and visual disturbance  Respiratory: Negative for cough, chest tightness, shortness of breath and wheezing  Cardiovascular: Negative for chest pain, palpitations and leg swelling  Gastrointestinal: Negative for abdominal distention, abdominal pain, blood in stool, constipation, diarrhea, nausea and vomiting  Endocrine: Positive for polydipsia and polyuria  Negative for cold intolerance, heat intolerance and polyphagia  Genitourinary: Positive for dysuria, flank pain and frequency  Negative for hematuria  Musculoskeletal: Negative for back pain, gait problem, joint swelling, myalgias, neck pain and neck stiffness  Skin: Negative for color change, pallor, rash and wound  Neurological: Negative for dizziness, tremors, seizures, syncope, speech difficulty, weakness, numbness and headaches  Psychiatric/Behavioral: Negative for agitation  Historical Information   Past Medical History:   Diagnosis Date    [facility] ([facility] )     type 2    Hypertension     Psychiatric disorder     depression     History reviewed  No pertinent surgical history    Social History   Social History     Substance and Sexual Activity   Alcohol Use Not Currently    Comment: occasionally     Social History     Substance and Sexual Activity   Drug Use No     Social History     Tobacco Use   Smoking Status Current Every Day Smoker    Packs/day: 0 50    Types: Cigarettes   Smokeless Tobacco Never Used     Family History:   Family History   Problem Relation Age of Onset    Diabetes Mother     Diabetes Maternal Grandmother        Meds/Allergies   Current Facility-Administered Medications   Medication Dose Route Frequency Provider Last Rate Last Admin    acetaminophen (TYLENOL) tablet 975 mg  975 mg Oral [facility] Rhetta Harriet, DO   975 mg at 03/01/21 0509    cefepime (MAXIPIME) 2 g/50 mL dextrose IVPB  2,000 mg Intravenous Q12H Rhetta Harriet, DO        heparin (porcine) subcutaneous injection 5,000 Units  5,000 Units Subcutaneous Q8H [address] Rhetta Harriet, DO   Stopped at 03/01/21 0600    HYDROmorphone (DILAUDID) injection 1 mg  1 mg Intravenous Q4H PRN Rhetta Harriet, DO        influenza vaccine, quadrivalent (FLUARIX) IM injection 0 5 mL  0 5 mL Intramuscular Once Rhetta Harriet, DO        insulin regular (HumuLIN R,NovoLIN R) 1 Units/mL in sodium chloride 0 9 % 100 mL infusion  0 3-21 Units/hr Intravenous Titrated Rhetta Harriet, DO 1 5 mL/hr at 03/01/21 0624 1 5 Units/hr at 03/01/21 0624    oxyCODONE (ROXICODONE) IR tablet 5 mg  5 mg Oral Q4H PRN Rhetta Harriet, DO   5 mg at 03/01/21 0443    sodium chloride 0 9 % infusion  125 mL/hr Intravenous Continuous Rhetta Harriet,  mL/hr at 03/01/21 0233 125 mL/hr at 03/01/21 0233     Allergies   Allergen Reactions    Penicillins        Objective   Vitals: Blood pressure 120/71, pulse 93, temperature 99 9 °F (37 7 °C), temperature source Oral, resp  rate 18, height 5' 5" (1 651 m), weight 52 2 kg (115 lb), SpO2 96 %      Intake/Output Summary (Last 24 hours) at 3/1/2021 0900  Last data filed at 3/1/2021 0700  Gross per 24 hour   Intake 4911 78 ml   Output 2050 ml   Net 2861 78 ml     Invasive Devices     Peripheral Intravenous Line            Peripheral IV 02/28/21 Left Arm less than 1 day    Peripheral IV 02/28/21 Right Arm less than 1 day          Drain            Urethral Catheter Latex 16 Fr  less than 1 day                Physical Exam  Vitals signs reviewed  Constitutional:       General: He is not in acute distress  Appearance: Normal appearance  He is normal weight  He is not ill-appearing, toxic-appearing or diaphoretic  HENT:      Head: Normocephalic and atraumatic  Nose: No congestion  Eyes:      General:         Right eye: No discharge  Left eye: No discharge  Extraocular Movements: Extraocular movements intact  Pupils: Pupils are equal, round, and reactive to light  Neck:      Musculoskeletal: Normal range of motion and neck supple  No neck rigidity or muscular tenderness  Vascular: No carotid bruit  Cardiovascular:      Rate and Rhythm: Normal rate and regular rhythm  Pulses: Normal pulses  Heart sounds: No murmur  Pulmonary:      Effort: No respiratory distress  Breath sounds: No wheezing, rhonchi or rales  Abdominal:      General: Abdomen is flat  There is no distension  Palpations: Abdomen is soft  There is no mass  Tenderness: There is no abdominal tenderness  Musculoskeletal:         General: No swelling or tenderness  Right lower leg: No edema  Left lower leg: No edema  Lymphadenopathy:      Cervical: No cervical adenopathy  Skin:     General: Skin is warm and dry  Capillary Refill: Capillary refill takes less than 2 seconds  Neurological:      General: No focal deficit present  Mental Status: He is alert and oriented to person, place, and time  The history was obtained from the review of the chart, patient      Lab Results:   Results from last 7 days   Lab Units 03/01/21  0445   HEMOGLOBIN A1C % >14 0*     Lab Results   Component Value Date    WBC 23 43 (H) 03/01/2021    HGB 11 8 (L) 03/01/2021    HCT 35 1 (L) 03/01/2021    MCV 85 03/01/2021     (H) 03/01/2021     Lab Results   Component Value Date/Time    BUN 7 03/01/2021 08:19 AM    K 3 5 03/01/2021 08:19 AM     03/01/2021 08:19 AM    CO2 31 03/01/2021 08:19 AM    CREATININE 0 41 (L) 03/01/2021 08:19 AM    AST 7 03/01/2021 04:45 AM    ALT <6 (L) 03/01/2021 04:45 AM    ALB 2 0 (L) 03/01/2021 04:45 AM     No results for input(s): CHOL, HDL, LDL, TRIG, VLDL in the last 72 hours  No results found for: Caputa Flattery  POC Glucose (mg/dl)   Date Value   03/01/2021 112   03/01/2021 134   03/01/2021 128   03/01/2021 133   03/01/2021 237 (H)   02/28/2021 294 (H)   02/28/2021 376 (H)   02/28/2021 320 (H)   02/28/2021 334 (H)   02/28/2021 >500 (HH)       Imaging Studies: I have personally reviewed pertinent reports  Portions of the record may have been created with voice recognition software

## 2021-03-01 NOTE — CONSULTS
UROLOGY CONSULTATION NOTE     Patient Identifiers: Zachary Piper (MRN [id number])  Service Requesting Consultation: SLIM  Service Providing Consultation:  Urology, Donna Huff MD    Date of Service: 2/28/2021    Reason for Consultation: prostate abscess, infected renal cyst, DKA      ASSESSMENT:     46-year-old male in DKA with prostate abscess and super infected renal cyst     PLAN:     I recommended the patient be admitted to the medical service for aggressive management of his DKA and medical optimization  Would recommend the patient proceed to the operating room for transurethral resection of his prostate and unroofing of the prostate abscess  Risks and benefits of the surgery were discussed described patient including sepsis, infection, damage to surrounding structures including the bladder neck and urethral sphincter mechanism, ureteral orifices and ejaculatory mechanism, need for additional treatments or procedures  Following surgery to unroof the abscess, the patient may require additional management the form of interventional radiology drainage of the left renal cyst   I reviewed this with him as well  Patient will be managed overnight and optimized for the OR  Informed consent performed for transurethral resection of the prostate and unroofing of abscess  Thank you for allowing me to participate in this patients care  Please do not hesitate to call with any additional questions  Donna Huff MD    History of Present Illness:     Zachary Piper is a 40 y o  old with a history of significant depression  Patient reports that he often does not refill his medications  This leads to precipitated diabetic ketoacidosis as the patient presents today  Patient denies that he has had drainage of purulent material from his urethra  He denies urinary symptoms of frequency urgency burning or gross hematuria  Patient is not sexually active and does have difficulty with erections    On imaging, the patient is noted to have bilobar prosthetic abscess  He also has a left lower pole renal cyst which appears to be superinfected  Upon admission to the floor, patient was noted to have higher residual volumes and a Matthews catheter was placed by the nursing staff  Past Medical, Past Surgical History:     Past Medical History:   Diagnosis Date    Diabetes mellitus ([address] )     type 2    Hypertension     Psychiatric disorder     depression   :    History reviewed  No pertinent surgical history :    Medications, Allergies:     Current Facility-Administered Medications:     acetaminophen (TYLENOL) tablet 975 mg, 975 mg, Oral, Q8H [facility], Justice Constantino DO    [START ON 3/1/2021] cefepime (MAXIPIME) 2 g/50 mL dextrose IVPB, 2,000 mg, Intravenous, Q12H, Justice Constantino DO    heparin (porcine) subcutaneous injection 5,000 Units, 5,000 Units, Subcutaneous, Q8H [facility] **AND** Platelet count, , , Once, Justice Constantino DO    HYDROmorphone (DILAUDID) injection 1 mg, 1 mg, Intravenous, Q4H PRN, Justice Constantino DO    influenza vaccine, quadrivalent (FLUARIX) IM injection 0 5 mL, 0 5 mL, Intramuscular, Once, Justice Constantino DO    insulin regular (HumuLIN R,NovoLIN R) 1 Units/mL in sodium chloride 0 9 % 100 mL infusion, 0 3-21 Units/hr, Intravenous, Titrated, Justice Constantino DO, Last Rate: 8 mL/hr at 02/28/21 2021, 8 Units/hr at 02/28/21 2021    oxyCODONE (ROXICODONE) IR tablet 5 mg, 5 mg, Oral, Q4H PRN, Justice Constantino DO, 5 mg at 02/28/21 2030    sodium chloride 0 9 % infusion, 125 mL/hr, Intravenous, Continuous, Justice Constantino DO, Last Rate: 125 mL/hr at 02/28/21 1821, 125 mL/hr at 02/28/21 1821    Allergies:   Allergies   Allergen Reactions    Penicillins    :    Social and Family History:   Social History:   Social History     Tobacco Use    Smoking status: Current Every Day Smoker     Packs/day: 0 50     Types: Cigarettes    Smokeless tobacco: Never Used   Substance Use Topics    Alcohol use: Not Currently     Comment: occasionally    Drug use: No        Social History     Tobacco Use   Smoking Status Current Every Day Smoker    Packs/day: 0 50    Types: Cigarettes   Smokeless Tobacco Never Used       Family History:  Family History   Problem Relation Age of Onset    Diabetes Mother     Diabetes Maternal Grandmother    :     Review of Systems:     General: Fever, chills, or night sweats: positive  Neurologic:  Positive for weakness   Cardiac: Negative for chest pain  Pulmonary: Negative for shortness of breath  Gastrointestinal: Abdominal pain positive  Nausea, vomiting, or diarrhea positive,  Genitourinary: See HPI above  Patient does not have hematuria  Musculoskeletal:positive for back pain  Dermatologic:  Negative for rashes or lesions  All other systems queried were negative  Physical Exam:   General: Patient is ill-appearing  /72 (BP Location: Left arm)   Pulse 90   Temp 98 9 °F (37 2 °C) (Oral)   Resp 16   Ht 5' 5" (1 651 m)   Wt 52 2 kg (115 lb)   SpO2 98%   BMI 19 14 kg/m²   Cardiac: Peripheral edema: negative  Pulmonary: Non-labored breathing  Abdomen: Soft, non-tender, non-distended  No surgical scars  No masses, tenderness, hernias noted  Genitourinary: Positive CVA tenderness, positive suprapubic tenderness  (Male): Phallus normal, circumcised, meatus patent  Testicles descended into scrotum bilaterally without masses nor tenderness  No inguinal hernias bilaterally  Matthews catheter is in place draining clear yellow urine  Digital rectal exam deferred      Musculoskeletal normal range of motion without weakness  Dermatologic: skin is warm dry without rash    Labs:     Lab Results   Component Value Date    HGB 14 5 02/28/2021    HCT 41 7 02/28/2021    WBC 27 00 (H) 02/28/2021     (H) 02/28/2021   ]    Lab Results   Component Value Date    K 3 9 02/28/2021    CL 90 (L) 02/28/2021    CO2 29 02/28/2021    BUN 17 02/28/2021    CREATININE 0 68 02/28/2021    CALCIUM 8 4 02/28/2021   ]    Imaging:   I personally reviewed the images and report of the following studies, and reviewed them with the patient:    CT ABDOMEN AND PELVIS WITH IV CONTRAST     INDICATION:   abd tenderness      COMPARISON:  None      TECHNIQUE:  CT examination of the abdomen and pelvis was performed  Axial, sagittal, and coronal 2D reformatted images were created from the source data and submitted for interpretation      Radiation dose length product (DLP) for this visit:  449 61 mGy-cm   This examination, like all CT scans performed in the [facility], was performed utilizing techniques to minimize radiation dose exposure, including the use of iterative   reconstruction and automated exposure control      IV Contrast:  100 mL of iohexol (OMNIPAQUE)  Enteric Contrast:  Enteric contrast was not administered      FINDINGS:     ABDOMEN     LOWER CHEST:  Atelectatic changes are noted at the lung bases        LIVER/BILIARY TREE:  Unremarkable      GALLBLADDER:  No calcified gallstones  No pericholecystic inflammatory change      SPLEEN:  Unremarkable      PANCREAS:  Unremarkable      ADRENAL GLANDS:  Unremarkable      KIDNEYS/URETERS:  Heterogeneous cortical enhancement of the bilateral kidneys identified with mild right-sided hydronephrosis concerning for pyelonephritis  Exophytic from the lower pole of the left kidney, a bilobed peripherally enhancing cystic lesion   measures 4 9 x 2 5 cm with inflammatory changes in the adjacent perinephric fat  Superinfected renal cyst suspected      STOMACH AND BOWEL:  Unremarkable      APPENDIX:  A normal appendix was visualized      ABDOMINOPELVIC CAVITY:  No ascites  No pneumoperitoneum    No lymphadenopathy      VESSELS:  Unremarkable for patient's age      PELVIS     REPRODUCTIVE ORGANS:  Peripherally enhancing fluid collections in both lobes of the prostate gland noted measuring 1 5 x 1 5 cm on the right and 1 6 x 1 1 cm on the left consistent with prostatic abscesses secondary to prostatitis      URINARY BLADDER:  Diffuse bladder wall enhancement with no perivesical inflammation likely sequela of ascending cystitis      ABDOMINAL WALL/INGUINAL REGIONS:  Unremarkable      OSSEOUS STRUCTURES:  No acute fracture or destructive osseous lesion      IMPRESSION:        1    Bilateral pyelonephritis with suspected superinfected infected cyst exophytic from the left lower pole measuring 4 9 x 2 5 cm   2   Small enhancing abscesses in both lobes of the prostate gland with adjacent cystitis, findings consistent with complicated prostatitis with secondary ascending infection

## 2021-03-02 LAB
ANION GAP SERPL CALCULATED.3IONS-SCNC: 6 MMOL/L (ref 4–13)
BACTERIA UR CULT: ABNORMAL
BASOPHILS # BLD AUTO: 0.07 THOUSANDS/ΜL (ref 0–0.1)
BASOPHILS NFR BLD AUTO: 0 % (ref 0–1)
BUN SERPL-MCNC: 7 MG/DL (ref 5–25)
CALCIUM SERPL-MCNC: 8.4 MG/DL (ref 8.3–10.1)
CHLORIDE SERPL-SCNC: 100 MMOL/L (ref 100–108)
CO2 SERPL-SCNC: 27 MMOL/L (ref 21–32)
CREAT SERPL-MCNC: 0.42 MG/DL (ref 0.6–1.3)
EOSINOPHIL # BLD AUTO: 0.07 THOUSAND/ΜL (ref 0–0.61)
EOSINOPHIL NFR BLD AUTO: 0 % (ref 0–6)
ERYTHROCYTE [DISTWIDTH] IN BLOOD BY AUTOMATED COUNT: 11.8 % (ref 11.6–15.1)
GFR SERPL CREATININE-BSD FRML MDRD: 141 ML/MIN/1.73SQ M
GLUCOSE SERPL-MCNC: 122 MG/DL (ref 65–140)
GLUCOSE SERPL-MCNC: 260 MG/DL (ref 65–140)
GLUCOSE SERPL-MCNC: 289 MG/DL (ref 65–140)
GLUCOSE SERPL-MCNC: 315 MG/DL (ref 65–140)
GLUCOSE SERPL-MCNC: 334 MG/DL (ref 65–140)
HCT VFR BLD AUTO: 33.3 % (ref 36.5–49.3)
HGB BLD-MCNC: 11.1 G/DL (ref 12–17)
IMM GRANULOCYTES # BLD AUTO: >0.5 THOUSAND/UL (ref 0–0.2)
IMM GRANULOCYTES NFR BLD AUTO: 2 % (ref 0–2)
LYMPHOCYTES # BLD AUTO: 1.9 THOUSANDS/ΜL (ref 0.6–4.47)
LYMPHOCYTES NFR BLD AUTO: 8 % (ref 14–44)
MCH RBC QN AUTO: 28.5 PG (ref 26.8–34.3)
MCHC RBC AUTO-ENTMCNC: 33.3 G/DL (ref 31.4–37.4)
MCV RBC AUTO: 86 FL (ref 82–98)
MONOCYTES # BLD AUTO: 2.33 THOUSAND/ΜL (ref 0.17–1.22)
MONOCYTES NFR BLD AUTO: 10 % (ref 4–12)
NEUTROPHILS # BLD AUTO: 17.91 THOUSANDS/ΜL (ref 1.85–7.62)
NEUTS SEG NFR BLD AUTO: 80 % (ref 43–75)
NRBC BLD AUTO-RTO: 0 /100 WBCS
PLATELET # BLD AUTO: 433 THOUSANDS/UL (ref 149–390)
PMV BLD AUTO: 8.5 FL (ref 8.9–12.7)
POTASSIUM SERPL-SCNC: 3.8 MMOL/L (ref 3.5–5.3)
RBC # BLD AUTO: 3.89 MILLION/UL (ref 3.88–5.62)
SODIUM SERPL-SCNC: 133 MMOL/L (ref 136–145)
WBC # BLD AUTO: 22.81 THOUSAND/UL (ref 4.31–10.16)

## 2021-03-02 PROCEDURE — 85025 COMPLETE CBC W/AUTO DIFF WBC: CPT | Performed by: INTERNAL MEDICINE

## 2021-03-02 PROCEDURE — 83519 RIA NONANTIBODY: CPT | Performed by: STUDENT IN AN ORGANIZED HEALTH CARE EDUCATION/TRAINING PROGRAM

## 2021-03-02 PROCEDURE — 99232 SBSQ HOSP IP/OBS MODERATE 35: CPT | Performed by: INTERNAL MEDICINE

## 2021-03-02 PROCEDURE — 82948 REAGENT STRIP/BLOOD GLUCOSE: CPT

## 2021-03-02 PROCEDURE — 86341 ISLET CELL ANTIBODY: CPT | Performed by: STUDENT IN AN ORGANIZED HEALTH CARE EDUCATION/TRAINING PROGRAM

## 2021-03-02 PROCEDURE — 80048 BASIC METABOLIC PNL TOTAL CA: CPT | Performed by: INTERNAL MEDICINE

## 2021-03-02 PROCEDURE — 99024 POSTOP FOLLOW-UP VISIT: CPT | Performed by: UROLOGY

## 2021-03-02 RX ORDER — INSULIN GLARGINE 100 [IU]/ML
5 INJECTION, SOLUTION SUBCUTANEOUS
Status: DISCONTINUED | OUTPATIENT
Start: 2021-03-02 | End: 2021-03-02

## 2021-03-02 RX ORDER — SENNOSIDES 8.6 MG
2 TABLET ORAL DAILY
Status: DISCONTINUED | OUTPATIENT
Start: 2021-03-02 | End: 2021-03-05

## 2021-03-02 RX ORDER — NICOTINE 21 MG/24HR
21 PATCH, TRANSDERMAL 24 HOURS TRANSDERMAL DAILY
Status: DISCONTINUED | OUTPATIENT
Start: 2021-03-02 | End: 2021-03-09 | Stop reason: HOSPADM

## 2021-03-02 RX ORDER — KETOROLAC TROMETHAMINE 30 MG/ML
15 INJECTION, SOLUTION INTRAMUSCULAR; INTRAVENOUS ONCE
Status: COMPLETED | OUTPATIENT
Start: 2021-03-02 | End: 2021-03-02

## 2021-03-02 RX ORDER — GABAPENTIN 300 MG/1
300 CAPSULE ORAL 3 TIMES DAILY
Status: DISCONTINUED | OUTPATIENT
Start: 2021-03-02 | End: 2021-03-09 | Stop reason: HOSPADM

## 2021-03-02 RX ORDER — INSULIN GLARGINE 100 [IU]/ML
20 INJECTION, SOLUTION SUBCUTANEOUS
Status: DISCONTINUED | OUTPATIENT
Start: 2021-03-02 | End: 2021-03-03

## 2021-03-02 RX ADMIN — NICOTINE 21 MG: 21 PATCH, EXTENDED RELEASE TRANSDERMAL at 17:05

## 2021-03-02 RX ADMIN — INSULIN LISPRO 5 UNITS: 100 INJECTION, SOLUTION INTRAVENOUS; SUBCUTANEOUS at 07:43

## 2021-03-02 RX ADMIN — HEPARIN SODIUM 5000 UNITS: 5000 INJECTION INTRAVENOUS; SUBCUTANEOUS at 21:22

## 2021-03-02 RX ADMIN — OXYCODONE HYDROCHLORIDE 5 MG: 5 TABLET ORAL at 21:22

## 2021-03-02 RX ADMIN — SODIUM CHLORIDE 125 ML/HR: 0.9 INJECTION, SOLUTION INTRAVENOUS at 15:45

## 2021-03-02 RX ADMIN — INSULIN LISPRO 3 UNITS: 100 INJECTION, SOLUTION INTRAVENOUS; SUBCUTANEOUS at 21:29

## 2021-03-02 RX ADMIN — CEFEPIME HYDROCHLORIDE 2000 MG: 2 INJECTION, POWDER, FOR SOLUTION INTRAVENOUS at 01:09

## 2021-03-02 RX ADMIN — INSULIN LISPRO 3 UNITS: 100 INJECTION, SOLUTION INTRAVENOUS; SUBCUTANEOUS at 07:43

## 2021-03-02 RX ADMIN — GABAPENTIN 300 MG: 300 CAPSULE ORAL at 16:42

## 2021-03-02 RX ADMIN — INSULIN LISPRO 5 UNITS: 100 INJECTION, SOLUTION INTRAVENOUS; SUBCUTANEOUS at 11:50

## 2021-03-02 RX ADMIN — SODIUM CHLORIDE 125 ML/HR: 0.9 INJECTION, SOLUTION INTRAVENOUS at 23:03

## 2021-03-02 RX ADMIN — SODIUM CHLORIDE 125 ML/HR: 0.9 INJECTION, SOLUTION INTRAVENOUS at 06:25

## 2021-03-02 RX ADMIN — INSULIN LISPRO 3 UNITS: 100 INJECTION, SOLUTION INTRAVENOUS; SUBCUTANEOUS at 16:41

## 2021-03-02 RX ADMIN — HYDROMORPHONE HYDROCHLORIDE 1 MG: 1 INJECTION, SOLUTION INTRAMUSCULAR; INTRAVENOUS; SUBCUTANEOUS at 05:02

## 2021-03-02 RX ADMIN — INSULIN GLARGINE 20 UNITS: 100 INJECTION, SOLUTION SUBCUTANEOUS at 21:22

## 2021-03-02 RX ADMIN — GABAPENTIN 300 MG: 300 CAPSULE ORAL at 21:22

## 2021-03-02 RX ADMIN — KETOROLAC TROMETHAMINE 15 MG: 30 INJECTION, SOLUTION INTRAMUSCULAR; INTRAVENOUS at 11:44

## 2021-03-02 RX ADMIN — STANDARDIZED SENNA CONCENTRATE 17.2 MG: 8.6 TABLET ORAL at 11:43

## 2021-03-02 RX ADMIN — ACETAMINOPHEN 975 MG: 325 TABLET ORAL at 21:22

## 2021-03-02 RX ADMIN — ACETAMINOPHEN 975 MG: 325 TABLET ORAL at 13:59

## 2021-03-02 RX ADMIN — HEPARIN SODIUM 5000 UNITS: 5000 INJECTION INTRAVENOUS; SUBCUTANEOUS at 13:59

## 2021-03-02 RX ADMIN — HEPARIN SODIUM 5000 UNITS: 5000 INJECTION INTRAVENOUS; SUBCUTANEOUS at 05:08

## 2021-03-02 RX ADMIN — CEFEPIME HYDROCHLORIDE 2000 MG: 2 INJECTION, POWDER, FOR SOLUTION INTRAVENOUS at 11:43

## 2021-03-02 RX ADMIN — INSULIN LISPRO 4 UNITS: 100 INJECTION, SOLUTION INTRAVENOUS; SUBCUTANEOUS at 11:50

## 2021-03-02 RX ADMIN — GABAPENTIN 300 MG: 300 CAPSULE ORAL at 11:43

## 2021-03-02 RX ADMIN — ACETAMINOPHEN 975 MG: 325 TABLET ORAL at 05:03

## 2021-03-02 RX ADMIN — OXYCODONE HYDROCHLORIDE 5 MG: 5 TABLET ORAL at 17:06

## 2021-03-02 NOTE — ASSESSMENT & PLAN NOTE
· Patient with underlying poorly-controlled diabetes, poor insulin compliance, presented with bilateral back pain, fatigue, dysuria, and hematuria    · CT scan with Bilateral pyelonephritis with suspected superinfected infected cyst exophytic from the left lower pole measuring 4 9 x 2 5 cm  Small enhancing abscesses in both lobes of the prostate gland with adjacent cystitis, findings consistent with complicated prostatitis with secondary ascending infection  · Status post cystoscopy with drainage of prostatic abscess  · Urine cultures positive for MSSA, will continue cefepime    · Awaiting repeat CT scan tomorrow for possible perinephric abscess, may require drainage

## 2021-03-02 NOTE — PROGRESS NOTES
Progress Note - Rocio Senate 1975, 40 y o  male MRN: [id number]    Unit/Bed#: [facility] 522-01 Encounter: [id number]    Primary Care Provider: Anna Ureña DO   Date and time admitted to hospital: 2/28/2021 11:44 AM        * Pyelonephritis  Assessment & Plan  · Patient with underlying poorly-controlled diabetes, poor insulin compliance, presented with bilateral back pain, fatigue, dysuria, and hematuria    · CT scan with Bilateral pyelonephritis with suspected superinfected infected cyst exophytic from the left lower pole measuring 4 9 x 2 5 cm  Small enhancing abscesses in both lobes of the prostate gland with adjacent cystitis, findings consistent with complicated prostatitis with secondary ascending infection  · Status post cystoscopy with drainage of prostatic abscess  · Urine cultures positive for MSSA, will continue cefepime    · Awaiting repeat CT scan tomorrow for possible perinephric abscess, may require drainage    Prostate abscess  Assessment & Plan  Management as above    Sepsis ([address] )  Assessment & Plan  POA, secondary to above  Patient presented with fever, tachycardia, leukocytosis  Continue with IV cefepime  Continue with above treatment  Monitor    Hyponatremia  Assessment & Plan  Secondary to above and decreasing oral intake  Improved  Discontinue IV fluids    Tobacco abuse  Assessment & Plan  Agreeable to nicotine patch at this time    Type 2 diabetes mellitus with hyperglycemia, with long-term current use of insulin [facility])  Assessment & Plan  Lab Results   Component Value Date    HGBA1C >14 0 (H) 03/01/2021       Recent Labs     03/01/21  2217 03/02/21  0632 03/02/21  1132 03/02/21  1517   POCGLU 162* 289* 334* 315*       Blood Sugar Average: Last 72 hrs:  (P) 149 [id number]   Poorly-controlled  Noncompliance  Endocrinology following, adjusting basal bolus insulin        VTE Pharmacologic Prophylaxis:   Pharmacologic: Heparin  Mechanical VTE Prophylaxis in Place: Yes    Patient Centered Rounds: I have performed bedside rounds with nursing staff today  Discussions with Specialists or Other Care Team Provider: endocrinology    Education and Discussions with Family / Patient: patient, plan of care    Time Spent for Care: 20 minutes  More than 50% of total time spent on counseling and coordination of care as described above  Current Length of Stay: 2 day(s)    Current Patient Status: Inpatient   Certification Statement: The patient will continue to require additional inpatient hospital stay due to CT scan, IV ABX    Discharge Plan: TBD    Code Status: Level 1 - Full Code      Subjective:   Reports that he is feeling better today compared to yesterday, he does report pain in the right posterior shoulder area  Objective:     Vitals:   Temp (24hrs), Av °F (37 2 °C), Min:98 6 °F (37 °C), Max:99 8 °F (37 7 °C)    Temp:  [98 6 °F (37 °C)-99 8 °F (37 7 °C)] 99 °F (37 2 °C)  HR:  [] 100  Resp:  [16-20] 20  BP: (124-148)/(72-88) 143/86  SpO2:  [96 %] 96 %  Body mass index is 19 14 kg/m²  Input and Output Summary (last 24 hours): Intake/Output Summary (Last 24 hours) at 3/2/2021 1749  Last data filed at 3/2/2021 1544  Gross per 24 hour   Intake 4316 18 ml   Output 4525 ml   Net -208 82 ml       Physical Exam:     Physical Exam  Constitutional:       Appearance: Normal appearance  HENT:      Head: Normocephalic and atraumatic  Nose: Nose normal       Mouth/Throat:      Mouth: Mucous membranes are moist       Pharynx: Oropharynx is clear  Eyes:      Extraocular Movements: Extraocular movements intact  Cardiovascular:      Rate and Rhythm: Normal rate and regular rhythm  Pulmonary:      Breath sounds: No wheezing or rales  Abdominal:      General: There is no distension  Palpations: Abdomen is soft  Tenderness: There is no abdominal tenderness     Genitourinary:     Comments: Matthews catheter with clear yellow urine  Musculoskeletal:         General: Tenderness present  No signs of injury  Skin:     General: Skin is warm and dry  Neurological:      General: No focal deficit present  Mental Status: He is alert and oriented to person, place, and time  Additional Data:     Labs:    Results from last 7 days   Lab Units 03/02/21  0308  02/28/21  1234   WBC Thousand/uL 22 81*   < > 27 00*   HEMOGLOBIN g/dL 11 1*   < > 14 5   HEMATOCRIT % 33 3*   < > 41 7   PLATELETS Thousands/uL 433*   < > 562*   BANDS PCT %  --   --  3   NEUTROS PCT % 80*   < >  --    LYMPHS PCT % 8*   < >  --    LYMPHO PCT %  --   --  2*   MONOS PCT % 10   < >  --    MONO PCT %  --   --  8   EOS PCT % 0   < > 2    < > = values in this interval not displayed  Results from last 7 days   Lab Units 03/02/21  0308  03/01/21  0445   SODIUM mmol/L 133*   < > 137   POTASSIUM mmol/L 3 8   < > 3 8   CHLORIDE mmol/L 100   < > 103   CO2 mmol/L 27   < > 29   BUN mg/dL 7   < > 8   CREATININE mg/dL 0 42*   < > 0 42*   ANION GAP mmol/L 6   < > 5   CALCIUM mg/dL 8 4   < > 9 1   ALBUMIN g/dL  --   --  2 0*   TOTAL BILIRUBIN mg/dL  --   --  0 16*   ALK PHOS U/L  --   --  157*   ALT U/L  --   --  <6*   AST U/L  --   --  7   GLUCOSE RANDOM mg/dL 260*   < > 124    < > = values in this interval not displayed  Results from last 7 days   Lab Units 02/28/21  1234   INR  1 02     Results from last 7 days   Lab Units 03/02/21  1517 03/02/21  1132 03/02/21  0632 03/01/21  2217 03/01/21  2005 03/01/21  1807 03/01/21  1751 03/01/21  1609 03/01/21  1412 03/01/21  1131 03/01/21  1049 03/01/21  0959   POC GLUCOSE mg/dl 315* 334* 289* 162* 123 176* 169* 317* 188* 132 122 116     Results from last 7 days   Lab Units 03/01/21  0445   HEMOGLOBIN A1C % >14 0*     Results from last 7 days   Lab Units 03/01/21  0024 02/28/21  1234   LACTIC ACID mmol/L  --  1 5   PROCALCITONIN ng/ml 0 50* 0 35*           * I Have Reviewed All Lab Data Listed Above  * Additional Pertinent Lab Tests Reviewed:  All Labs Within Last 24 Hours Reviewed      Recent Cultures (last 7 days):     Results from last 7 days   Lab Units 03/01/21  1100 02/28/21  1244 02/28/21  1235 02/28/21  1234   BLOOD CULTURE   --   --  No Growth at 48 hrs  No Growth at 48 hrs  URINE CULTURE  30,000-39,000 cfu/ml Staphylococcus aureus* >100,000 cfu/ml Staphylococcus aureus*  --   --        Last 24 Hours Medication List:   Current Facility-Administered Medications   Medication Dose Route Frequency Provider Last Rate    acetaminophen  975 mg Oral Q8H [address] Lydia Figueroa MD      cefepime  2,000 mg Intravenous Q12H Lydia Figueroa MD 2,000 mg (03/02/21 1143)    gabapentin  300 mg Oral TID Roselynn Angelucci, MD      heparin (porcine)  5,000 Units Subcutaneous [facility] Lydia Figueroa MD      HYDROmorphone  1 mg Intravenous Q4H PRN Lydia Figueroa MD      insulin glargine  20 Units Subcutaneous HS Victoriano Long MD      insulin lispro  1-5 Units Subcutaneous TID [facility] Victoriano Long MD      insulin lispro  1-5 Units Subcutaneous HS Victoriano Long MD      insulin lispro  8 Units Subcutaneous TID With Meals Victoriano Long MD      nicotine  21 mg Transdermal Daily Roselynn Angelucci, MD      oxyCODONE  5 mg Oral Q4H PRN Lydia Figueroa MD      senna  2 tablet Oral Daily Roselynn Angelucci, MD      sodium chloride  125 mL/hr Intravenous Continuous Lydia Figueroa  mL/hr (03/02/21 6303)        Today, Patient Was Seen By: Daron Berg MD    ** Please Note: Dictation voice to text software may have been used in the creation of this document   **

## 2021-03-02 NOTE — ASSESSMENT & PLAN NOTE
Lab Results   Component Value Date    HGBA1C >14 0 (H) 03/01/2021       Recent Labs     03/01/21  2217 03/02/21  0632 03/02/21  1132 03/02/21  1517   POCGLU 162* 289* 334* 315*       Blood Sugar Average: Last 72 hrs:  (P) 643 [id number]   Poorly-controlled  Noncompliance  Endocrinology following, adjusting basal bolus insulin

## 2021-03-02 NOTE — PROGRESS NOTES
UROLOGY PROGRESS NOTE   Patient Identifiers: Ti Yo (MRN [id number])  Date of Service: 3/2/2021        Assessment:   Postoperative day 1 status post transurethral resection of prosthetic abscess bilaterally     Plan:     Patient's urine is clear, he is somewhat uncomfortable with the Matthews but otherwise doing well  He appears nontoxic  Has some mild tachycardia  I discussed that given some ongoing left-sided flank pain, would consider repeating his CT abdomen with and without contrast tomorrow to reassess the renal cyst   If concerning, the patient would require IR drainage procedure potentially as early as Thursday  Will likely plan to remove the patient's urethral catheter Thursday or Friday depending on whether not he needs an interventional procedure  Patient's urine culture has been preliminary returned as staphylococci is  Yesterday's operative cultures still pending  Patient is currently on cefepime  If the patient clinically worsens, would consider restarting vancomycin  Subjective:     24 HR EVENTS:   Status post transurethral resection yesterday  Patient is bothered by his catheter  Some mild left-sided flank pain        Objective:     VITALS:    Vitals:    03/02/21 0646   BP: 124/76   Pulse: 88   Resp: 18   Temp: 98 9 °F (37 2 °C)   SpO2: 96%       INS & OUTS:  4127cc UOP    LABS:  Lab Results   Component Value Date    HGB 11 1 (L) 03/02/2021    HCT 33 3 (L) 03/02/2021    WBC 22 81 (H) 03/02/2021     (H) 03/02/2021   ]    Lab Results   Component Value Date    K 3 8 03/02/2021     03/02/2021    CO2 27 03/02/2021    BUN 7 03/02/2021    CREATININE 0 42 (L) 03/02/2021    CALCIUM 8 4 03/02/2021     Urine Culture >100,000 cfu/ml Staphylococcus speciesAbnormal                 Specimen Collected: 02/28/21 12:44   Last Resulted: 03/01/21 15:40        INPATIENT MEDS:    Current Facility-Administered Medications:     acetaminophen (TYLENOL) tablet 975 mg, 975 mg, Oral, Q8H [address], Henok De La Cruz MD, 975 mg at 03/02/21 0503    cefepime (MAXIPIME) 2 g/50 mL dextrose IVPB, 2,000 mg, Intravenous, Q12H, Henok De La Cruz MD, Last Rate: 100 mL/hr at 03/02/21 0109, 2,000 mg at 03/02/21 0109    heparin (porcine) subcutaneous injection 5,000 Units, 5,000 Units, Subcutaneous, Q8H [address], 5,000 Units at 03/02/21 0508 **AND** [COMPLETED] Platelet count, , , Once, Rafael Veras,     HYDROmorphone (DILAUDID) injection 1 mg, 1 mg, Intravenous, Q4H PRN, Hneok De La Cruz MD, 1 mg at 03/02/21 0502    insulin glargine (LANTUS) subcutaneous injection 15 Units 0 15 mL, 15 Units, Subcutaneous, HS, Silvina Beebe MD, 15 Units at 03/01/21 2227    insulin lispro (HumaLOG) 100 units/mL subcutaneous injection 1-5 Units, 1-5 Units, Subcutaneous, TID AC, 3 Units at 03/02/21 0743 **AND** Fingerstick Glucose (POCT), , , TID AC, Silvina Beebe MD    insulin lispro (HumaLOG) 100 units/mL subcutaneous injection 1-5 Units, 1-5 Units, Subcutaneous, HS, Silvina Beebe MD, 1 Units at 03/01/21 2233    insulin lispro (HumaLOG) 100 units/mL subcutaneous injection 5 Units, 5 Units, Subcutaneous, TID With Meals, Silvina Beebe MD, 5 Units at 03/02/21 0743    oxyCODONE (ROXICODONE) IR tablet 5 mg, 5 mg, Oral, Q4H PRN, Henok De La Cruz MD, 5 mg at 03/01/21 1323    sodium chloride 0 9 % infusion, 125 mL/hr, Intravenous, Continuous, Hneok De La Cruz MD, Last Rate: 125 mL/hr at 03/02/21 0625, 125 mL/hr at 03/02/21 9894      Physical Exam:   /76 (BP Location: Left arm)   Pulse 88   Temp 98 9 °F (37 2 °C) (Oral)   Resp 18   Ht 5' 5" (1 651 m)   Wt 52 2 kg (115 lb)   SpO2 96%   BMI 19 14 kg/m²   GEN: No acute distress  RESP: breathing comfortably with no accessory muscle use  CV: no significant peripheral edema  ABD: soft and appropriately tender to palpation  EISENBERG: in place draining clear yellow urine, somewhat particulate

## 2021-03-02 NOTE — PROGRESS NOTES
Progress Note - Sarah Kruger 40 y o  male MRN: [id number]    Unit/Bed#: [facility] 522-01 Encounter: [id number]      CC: diabetes f/u    Subjective:   Sarah Kruger is a 40y o  year old male with type 2 diabetes, hypertension who presented with bilateral flank pain and dysuria and is admitted for complicated prostatitis with abscess and bilateral pyelonephritis, status post TURP  Patient was seen and examined at bedside  He feels better, reports good appetite, no hypoglycemia reported  Objective:     Vitals: Blood pressure 124/76, pulse 88, temperature 98 9 °F (37 2 °C), temperature source Oral, resp  rate 18, height 5' 5" (1 651 m), weight 52 2 kg (115 lb), SpO2 96 %  ,Body mass index is 19 14 kg/m²  Intake/Output Summary (Last 24 hours) at 3/2/2021 1219  Last data filed at 3/2/2021 1201  Gross per 24 hour   Intake 4582 02 ml   Output 4850 ml   Net -267 98 ml       Physical Exam:  General Appearance: awake, appears stated age and cooperative  Head: Normocephalic, without obvious abnormality, atraumatic  Extremities: moves all extremities  Skin: Skin color and temperature normal    Pulm: no labored breathing    Lab, Imaging and other studies: I have personally reviewed pertinent reports  POC Glucose (mg/dl)   Date Value   03/02/2021 334 (H)   03/02/2021 289 (H)   03/01/2021 162 (H)   03/01/2021 123   03/01/2021 176 (H)   03/01/2021 169 (H)   03/01/2021 317 (H)   03/01/2021 188 (H)   03/01/2021 132   03/01/2021 122       Assessment and plan:    Type 2 diabetes mellitus with hyperglycemia, with long-term current use of insulin [facility])  Assessment & Plan  Lab Results   Component Value Date    HGBA1C >14 0 (H) 03/01/2021       Recent Labs     03/01/21  0412 03/01/21  0615 03/01/21  0819 03/01/21  0959   POCGLU 128 134 112 116     Uncontrolled with most recent A1c of greater than than 14%  The goal is less than 7%  Home regimen:  Basaglar 10 units q h s   And metformin 2000 mg twice daily however he is not compliant with his regimen  Currently on Lantus 15 units subcutaneously at bedtime and Humalog 5 units with meals 3 times a day plus correctional sliding scale, However patient blood sugars are above target goal for admitted patient  Will increase Lantus to 20 units and Humalog to 8 units and continue correctional sliding scale  Continue to monitor blood sugar over time and make adjustments to the regimen if necessary            Prostate abscess  Assessment & Plan  Management as per primary team    * Pyelonephritis  Assessment & Plan  Management as per primary team            Portions of the record may have been created with voice recognition software

## 2021-03-02 NOTE — ASSESSMENT & PLAN NOTE
POA, secondary to above  Patient presented with fever, tachycardia, leukocytosis  Continue with IV cefepime  Continue with above treatment  Monitor

## 2021-03-03 ENCOUNTER — TELEPHONE (OUTPATIENT)
Dept: RADIOLOGY | Facility: HOSPITAL | Age: 46
End: 2021-03-03

## 2021-03-03 ENCOUNTER — APPOINTMENT (INPATIENT)
Dept: RADIOLOGY | Facility: HOSPITAL | Age: 46
DRG: 710 | End: 2021-03-03
Payer: COMMERCIAL

## 2021-03-03 LAB
ANION GAP SERPL CALCULATED.3IONS-SCNC: 5 MMOL/L (ref 4–13)
BACTERIA UR CULT: ABNORMAL
BUN SERPL-MCNC: 6 MG/DL (ref 5–25)
CALCIUM SERPL-MCNC: 8.5 MG/DL (ref 8.3–10.1)
CHLORIDE SERPL-SCNC: 100 MMOL/L (ref 100–108)
CO2 SERPL-SCNC: 27 MMOL/L (ref 21–32)
CREAT SERPL-MCNC: 0.42 MG/DL (ref 0.6–1.3)
ERYTHROCYTE [DISTWIDTH] IN BLOOD BY AUTOMATED COUNT: 11.5 % (ref 11.6–15.1)
GFR SERPL CREATININE-BSD FRML MDRD: 141 ML/MIN/1.73SQ M
GLUCOSE SERPL-MCNC: 235 MG/DL (ref 65–140)
GLUCOSE SERPL-MCNC: 267 MG/DL (ref 65–140)
GLUCOSE SERPL-MCNC: 270 MG/DL (ref 65–140)
GLUCOSE SERPL-MCNC: 270 MG/DL (ref 65–140)
GLUCOSE SERPL-MCNC: 300 MG/DL (ref 65–140)
GLUCOSE SERPL-MCNC: 331 MG/DL (ref 65–140)
HCT VFR BLD AUTO: 33.4 % (ref 36.5–49.3)
HGB BLD-MCNC: 11.1 G/DL (ref 12–17)
MCH RBC QN AUTO: 28.5 PG (ref 26.8–34.3)
MCHC RBC AUTO-ENTMCNC: 33.2 G/DL (ref 31.4–37.4)
MCV RBC AUTO: 86 FL (ref 82–98)
PLATELET # BLD AUTO: 450 THOUSANDS/UL (ref 149–390)
PMV BLD AUTO: 8.3 FL (ref 8.9–12.7)
POTASSIUM SERPL-SCNC: 3.8 MMOL/L (ref 3.5–5.3)
RBC # BLD AUTO: 3.9 MILLION/UL (ref 3.88–5.62)
SODIUM SERPL-SCNC: 132 MMOL/L (ref 136–145)
WBC # BLD AUTO: 22.68 THOUSAND/UL (ref 4.31–10.16)

## 2021-03-03 PROCEDURE — 99232 SBSQ HOSP IP/OBS MODERATE 35: CPT | Performed by: INTERNAL MEDICINE

## 2021-03-03 PROCEDURE — 74170 CT ABD WO CNTRST FLWD CNTRST: CPT

## 2021-03-03 PROCEDURE — 82948 REAGENT STRIP/BLOOD GLUCOSE: CPT

## 2021-03-03 PROCEDURE — 80048 BASIC METABOLIC PNL TOTAL CA: CPT | Performed by: INTERNAL MEDICINE

## 2021-03-03 PROCEDURE — NC001 PR NO CHARGE: Performed by: NURSE PRACTITIONER

## 2021-03-03 PROCEDURE — 99024 POSTOP FOLLOW-UP VISIT: CPT | Performed by: UROLOGY

## 2021-03-03 PROCEDURE — G1004 CDSM NDSC: HCPCS

## 2021-03-03 PROCEDURE — 84681 ASSAY OF C-PEPTIDE: CPT | Performed by: INTERNAL MEDICINE

## 2021-03-03 PROCEDURE — 85027 COMPLETE CBC AUTOMATED: CPT | Performed by: INTERNAL MEDICINE

## 2021-03-03 RX ORDER — INSULIN GLARGINE 100 [IU]/ML
25 INJECTION, SOLUTION SUBCUTANEOUS
Status: DISCONTINUED | OUTPATIENT
Start: 2021-03-03 | End: 2021-03-04

## 2021-03-03 RX ADMIN — SODIUM CHLORIDE 125 ML/HR: 0.9 INJECTION, SOLUTION INTRAVENOUS at 17:27

## 2021-03-03 RX ADMIN — HYDROMORPHONE HYDROCHLORIDE 1 MG: 1 INJECTION, SOLUTION INTRAMUSCULAR; INTRAVENOUS; SUBCUTANEOUS at 02:35

## 2021-03-03 RX ADMIN — INSULIN LISPRO 3 UNITS: 100 INJECTION, SOLUTION INTRAVENOUS; SUBCUTANEOUS at 08:17

## 2021-03-03 RX ADMIN — ACETAMINOPHEN 975 MG: 325 TABLET ORAL at 05:06

## 2021-03-03 RX ADMIN — ACETAMINOPHEN 975 MG: 325 TABLET ORAL at 21:18

## 2021-03-03 RX ADMIN — HYDROMORPHONE HYDROCHLORIDE 1 MG: 1 INJECTION, SOLUTION INTRAMUSCULAR; INTRAVENOUS; SUBCUTANEOUS at 13:32

## 2021-03-03 RX ADMIN — GABAPENTIN 300 MG: 300 CAPSULE ORAL at 16:12

## 2021-03-03 RX ADMIN — CEFEPIME HYDROCHLORIDE 2000 MG: 2 INJECTION, POWDER, FOR SOLUTION INTRAVENOUS at 11:15

## 2021-03-03 RX ADMIN — HEPARIN SODIUM 5000 UNITS: 5000 INJECTION INTRAVENOUS; SUBCUTANEOUS at 13:29

## 2021-03-03 RX ADMIN — INSULIN LISPRO 3 UNITS: 100 INJECTION, SOLUTION INTRAVENOUS; SUBCUTANEOUS at 11:14

## 2021-03-03 RX ADMIN — HEPARIN SODIUM 5000 UNITS: 5000 INJECTION INTRAVENOUS; SUBCUTANEOUS at 05:05

## 2021-03-03 RX ADMIN — IOHEXOL 100 ML: 350 INJECTION, SOLUTION INTRAVENOUS at 10:14

## 2021-03-03 RX ADMIN — DICLOFENAC SODIUM 4 G: 10 GEL TOPICAL at 17:29

## 2021-03-03 RX ADMIN — CEFEPIME HYDROCHLORIDE 2000 MG: 2 INJECTION, POWDER, FOR SOLUTION INTRAVENOUS at 00:33

## 2021-03-03 RX ADMIN — INSULIN LISPRO 2 UNITS: 100 INJECTION, SOLUTION INTRAVENOUS; SUBCUTANEOUS at 16:13

## 2021-03-03 RX ADMIN — HEPARIN SODIUM 5000 UNITS: 5000 INJECTION INTRAVENOUS; SUBCUTANEOUS at 21:19

## 2021-03-03 RX ADMIN — ACETAMINOPHEN 975 MG: 325 TABLET ORAL at 13:29

## 2021-03-03 RX ADMIN — GABAPENTIN 300 MG: 300 CAPSULE ORAL at 08:19

## 2021-03-03 RX ADMIN — NICOTINE 21 MG: 21 PATCH, EXTENDED RELEASE TRANSDERMAL at 16:22

## 2021-03-03 RX ADMIN — INSULIN LISPRO 2 UNITS: 100 INJECTION, SOLUTION INTRAVENOUS; SUBCUTANEOUS at 21:20

## 2021-03-03 RX ADMIN — STANDARDIZED SENNA CONCENTRATE 17.2 MG: 8.6 TABLET ORAL at 08:19

## 2021-03-03 RX ADMIN — CEFEPIME HYDROCHLORIDE 2000 MG: 2 INJECTION, POWDER, FOR SOLUTION INTRAVENOUS at 23:17

## 2021-03-03 RX ADMIN — INSULIN GLARGINE 25 UNITS: 100 INJECTION, SOLUTION SUBCUTANEOUS at 21:18

## 2021-03-03 RX ADMIN — GABAPENTIN 300 MG: 300 CAPSULE ORAL at 21:18

## 2021-03-03 NOTE — PROGRESS NOTES
Progress Note - Rhett Conway 40 y o  male MRN: [id number]    [facility] Encounter: [id number]      CC: diabetes f/u    Subjective:   Rhett Conway is a 40y o  year old male with type 2 diabetes with long-term use of insulin who is admitted for prostate abscess and bilateral pyelonephritis, patient is status post TURP the plan is for IR guided percutaneous drain placement for pre renal abscess  Patient currently is on Lantus 20 units q h s  And Humalog 8 units t i d  A c  And correctional sliding scale however patient still has hyperglycemia  Patient reports adequate diet  Objective:     Vitals: Blood pressure 136/88, pulse 91, temperature 98 5 °F (36 9 °C), temperature source Oral, resp  rate 19, height 5' 5" (1 651 m), weight 52 2 kg (115 lb), SpO2 96 %  ,Body mass index is 19 14 kg/m²  Intake/Output Summary (Last 24 hours) at 3/3/2021 1219  Last data filed at 3/3/2021 1117  Gross per 24 hour   Intake 1573 83 ml   Output 4950 ml   Net -3376 17 ml       Physical Exam:  General Appearance: awake, appears stated age and cooperative  Head: Normocephalic, without obvious abnormality, atraumatic  Extremities: moves all extremities  Skin: Skin color and temperature normal    Pulm: no labored breathing    Lab, Imaging and other studies: I have personally reviewed pertinent reports  POC Glucose (mg/dl)   Date Value   03/02/2021 315 (H)   03/02/2021 334 (H)   03/02/2021 289 (H)   03/01/2021 162 (H)   03/01/2021 123   03/01/2021 176 (H)   03/01/2021 169 (H)   03/01/2021 317 (H)   03/01/2021 188 (H)   03/01/2021 132       Assessment and plan:  Type 2 diabetes mellitus with hyperglycemia, with long-term current use of insulin [facility])  Assessment & Plan  Lab Results   Component Value Date    HGBA1C >14 0 (H) 03/01/2021       Recent Labs     03/01/21  0412 03/01/21  0615 03/01/21  0819 03/01/21  0959   POCGLU 128 134 112 116     Uncontrolled with most recent A1c of greater than than 14%    The goal is less than 7%  Home regimen:  Basaglar 10 units q h s  And metformin 2000 mg twice daily however he is not compliant with his regimen  Currently on Lantus 20 units subcutaneously at bedtime and Humalog 8 units with meals 3 times a day plus correctional sliding scale insulin   However patient blood sugars are above target goal for admitted patient  Will increase Lantus to 25  and Humalog to 10 and continue correctional sliding scale  Continue to monitor blood sugar over time and make adjustments to the regimen if necessary  Prostate abscess  Assessment & Plan  Management as per primary team    * Pyelonephritis  Assessment & Plan  Management as per primary team            Portions of the record may have been created with voice recognition software

## 2021-03-03 NOTE — PROGRESS NOTES
Progress Note - Cordell Roberto 1975, 40 y o  male MRN: [id number]    Unit/Bed#: [facility] 522-01 Encounter: [id number]    Primary Care Provider: Noemi Mcintosh DO   Date and time admitted to hospital: 2/28/2021 11:44 AM        * Pyelonephritis  Assessment & Plan  · Patient with underlying poorly-controlled diabetes, poor insulin compliance, presented with bilateral back pain, fatigue, dysuria, and hematuria    · CT scan with Bilateral pyelonephritis with suspected superinfected infected cyst exophytic from the left lower pole measuring 4 9 x 2 5 cm  Small enhancing abscesses in both lobes of the prostate gland with adjacent cystitis, findings consistent with complicated prostatitis with secondary ascending infection  · Status post cystoscopy with drainage of prostatic abscess  · Urine cultures positive for MSSA, will continue cefepime    · CT scan today with persistent renal absess  · Consult IR for drainage    Prostate abscess  Assessment & Plan  Management as above    Sepsis ([address] )  Assessment & Plan  · POA, secondary to above  · Patient presented with fever, tachycardia, leukocytosis  · Continue with IV cefepime  · Continue with above treatment  · Monitor    Hyponatremia  Assessment & Plan  · Secondary to above and decreasing oral intake  · Mild, stable  · Oral intake is improved today    Tobacco abuse  Assessment & Plan  · Agreeable to nicotine patch at this time    Type 2 diabetes mellitus with hyperglycemia, with long-term current use of insulin [facility])  Assessment & Plan  Lab Results   Component Value Date    HGBA1C >14 0 (H) 03/01/2021       Recent Labs     03/01/21  2217 03/02/21  0632 03/02/21  1132 03/02/21  1517   POCGLU 162* 289* 334* 315*       Blood Sugar Average: Last 72 hrs:  (P) 552 [id number]   Poorly-controlled  Noncompliance  Endocrinology following, adjusting basal bolus insulin        VTE Pharmacologic Prophylaxis:   Pharmacologic: Heparin  Mechanical VTE Prophylaxis in Place: Yes    Patient Centered Rounds: I have performed bedside rounds with nursing staff today  Discussions with Specialists or Other Care Team Provider: urology    Education and Discussions with Family / Patient: patient, plan of care    Time Spent for Care: 20 minutes  More than 50% of total time spent on counseling and coordination of care as described above  Current Length of Stay: 3 day(s)    Current Patient Status: Inpatient   Certification Statement: The patient will continue to require additional inpatient hospital stay due to Ir drainage tomorrow    Discharge Plan: home when stable    Code Status: Level 1 - Full Code      Subjective:   Continues to have right shoulder pain, appetite improved, abdominal, flank pain improved  Objective:     Vitals:   Temp (24hrs), Av 7 °F (37 1 °C), Min:98 5 °F (36 9 °C), Max:98 9 °F (37 2 °C)    Temp:  [98 5 °F (36 9 °C)-98 9 °F (37 2 °C)] 98 5 °F (36 9 °C)  HR:  [91-93] 91  Resp:  [16-19] 19  BP: (136-148)/(88-90) 136/88  SpO2:  [94 %-96 %] 96 %  Body mass index is 19 14 kg/m²  Input and Output Summary (last 24 hours): Intake/Output Summary (Last 24 hours) at 3/3/2021 1534  Last data filed at 3/3/2021 1329  Gross per 24 hour   Intake 1573 83 ml   Output 6300 ml   Net -4726 17 ml       Physical Exam:     Physical Exam  Constitutional:       Appearance: Normal appearance  HENT:      Head: Normocephalic and atraumatic  Nose: Nose normal       Mouth/Throat:      Mouth: Mucous membranes are moist       Pharynx: Oropharynx is clear  Eyes:      Extraocular Movements: Extraocular movements intact  Cardiovascular:      Rate and Rhythm: Normal rate and regular rhythm  Pulmonary:      Effort: Pulmonary effort is normal       Breath sounds: No wheezing or rales  Abdominal:      General: Abdomen is flat  Palpations: Abdomen is soft     Genitourinary:     Comments: Matthews catheter with clear yellow urine  Musculoskeletal:         General: Tenderness present  Skin:     General: Skin is warm and dry  Neurological:      General: No focal deficit present  Mental Status: He is alert and oriented to person, place, and time  Psychiatric:         Mood and Affect: Mood normal          Behavior: Behavior normal            Additional Data:     Labs:    Results from last 7 days   Lab Units 03/03/21  0434 03/02/21  0308  02/28/21  1234   WBC Thousand/uL 22 68* 22 81*   < > 27 00*   HEMOGLOBIN g/dL 11 1* 11 1*   < > 14 5   HEMATOCRIT % 33 4* 33 3*   < > 41 7   PLATELETS Thousands/uL 450* 433*   < > 562*   BANDS PCT %  --   --   --  3   NEUTROS PCT %  --  80*   < >  --    LYMPHS PCT %  --  8*   < >  --    LYMPHO PCT %  --   --   --  2*   MONOS PCT %  --  10   < >  --    MONO PCT %  --   --   --  8   EOS PCT %  --  0   < > 2    < > = values in this interval not displayed  Results from last 7 days   Lab Units 03/03/21  0434  03/01/21  0445   SODIUM mmol/L 132*   < > 137   POTASSIUM mmol/L 3 8   < > 3 8   CHLORIDE mmol/L 100   < > 103   CO2 mmol/L 27   < > 29   BUN mg/dL 6   < > 8   CREATININE mg/dL 0 42*   < > 0 42*   ANION GAP mmol/L 5   < > 5   CALCIUM mg/dL 8 5   < > 9 1   ALBUMIN g/dL  --   --  2 0*   TOTAL BILIRUBIN mg/dL  --   --  0 16*   ALK PHOS U/L  --   --  157*   ALT U/L  --   --  <6*   AST U/L  --   --  7   GLUCOSE RANDOM mg/dL 270*   < > 124    < > = values in this interval not displayed       Results from last 7 days   Lab Units 02/28/21  1234   INR  1 02     Results from last 7 days   Lab Units 03/02/21  1517 03/02/21  1132 03/02/21  0632 03/01/21  2217 03/01/21  2005 03/01/21  1807 03/01/21  1751 03/01/21  1609 03/01/21  1412 03/01/21  1131 03/01/21  1049 03/01/21  0959   POC GLUCOSE mg/dl 315* 334* 289* 162* 123 176* 169* 317* 188* 132 122 116     Results from last 7 days   Lab Units 03/01/21  0445   HEMOGLOBIN A1C % >14 0*     Results from last 7 days   Lab Units 03/01/21  0024 02/28/21  1234   LACTIC ACID mmol/L  --  1 5   PROCALCITONIN ng/ml 0 50* 0 35*           * I Have Reviewed All Lab Data Listed Above  * Additional Pertinent Lab Tests Reviewed: All Labs Within Last 24 Hours Reviewed    CT abdomen reviewed    Recent Cultures (last 7 days):     Results from last 7 days   Lab Units 03/01/21  1100 02/28/21  1244 02/28/21  1235 02/28/21  1234   BLOOD CULTURE   --   --  No Growth at 48 hrs  No Growth at 48 hrs  URINE CULTURE  70,000-79,000 cfu/ml Staphylococcus aureus* >100,000 cfu/ml Staphylococcus aureus*  --   --        Last 24 Hours Medication List:   Current Facility-Administered Medications   Medication Dose Route Frequency Provider Last Rate    acetaminophen  975 mg Oral Q8H [facility] Denzel Lu MD      cefepime  2,000 mg Intravenous Q12H Denzel Lu MD 2,000 mg (03/03/21 1115)    Diclofenac Sodium  4 g Topical 4x Daily Ti Edwards MD      gabapentin  300 mg Oral TID Ti Edwards MD      heparin (porcine)  5,000 Units Subcutaneous [facility] Denzel Lu MD      HYDROmorphone  1 mg Intravenous Q4H PRN Denzel Lu MD      insulin glargine  20 Units Subcutaneous HS Erin Ryan MD      insulin lispro  1-5 Units Subcutaneous TID [facility] [place] Erin Ryan MD      insulin lispro  1-5 Units Subcutaneous HS Erin Ryan MD      insulin lispro  8 Units Subcutaneous TID With Meals Erin Ryan MD      nicotine  21 mg Transdermal Daily Ti Edwards MD      oxyCODONE  5 mg Oral Q4H PRN Denzel Lu MD      senna  2 tablet Oral Daily Ti Edwards MD      sodium chloride  125 mL/hr Intravenous Continuous Denzel Lu  mL/hr (03/02/21 2303)        Today, Patient Was Seen By: Abisai Yeung MD    ** Please Note: Dictation voice to text software may have been used in the creation of this document   **

## 2021-03-03 NOTE — NUTRITION
03/03/21 1504   Nutrition Prognosis   Nutrition Considerations Other (Comment); Motivation  (Reviewed DM nutrition guidelines, carbohydrate counting and label reading  Written education materials, Diabetes and you booklet and outpatient MNT brochure provided )   Recommendations/Interventions   Summary Patient's appetite remains good on current diet  Elevated blood glucose levels noted  Nursing skin care plan reviewed  Planned: CT guided left gabriella-renal abscess catheter 3/4  Interventions MNT via Outpatient;Education initiated/completed; Other (comment)  (Patient c/o still feeling hungry on current diet, requesting to increase calories )   Nutrition Recommendations Other (Specify); Lab consider order (Specify)  (Suggest increase diet to CCD2   Monitor electrolytes and obtain current standing scale weight )

## 2021-03-03 NOTE — ASSESSMENT & PLAN NOTE
· Patient with underlying poorly-controlled diabetes, poor insulin compliance, presented with bilateral back pain, fatigue, dysuria, and hematuria    · CT scan with Bilateral pyelonephritis with suspected superinfected infected cyst exophytic from the left lower pole measuring 4 9 x 2 5 cm  Small enhancing abscesses in both lobes of the prostate gland with adjacent cystitis, findings consistent with complicated prostatitis with secondary ascending infection  · Status post cystoscopy with drainage of prostatic abscess  · Urine cultures positive for MSSA, will continue cefepime    · CT scan today with persistent renal absess  · Consult IR for drainage

## 2021-03-03 NOTE — ASSESSMENT & PLAN NOTE
Lab Results   Component Value Date    HGBA1C >14 0 (H) 03/01/2021       Recent Labs     03/01/21  2217 03/02/21  0632 03/02/21  1132 03/02/21  1517   POCGLU 162* 289* 334* 315*       Blood Sugar Average: Last 72 hrs:  (P) 010 [id number]   Poorly-controlled  Noncompliance  Endocrinology following, adjusting basal bolus insulin

## 2021-03-03 NOTE — TELEMEDICINE
INTERPROFESSIONAL (PHONE) CONSULTATION - Interventional Radiology  Maria Guadalupe Desai 40 y o  male MRN: [id number]  Unit/Bed#: [facility] 522-01 Encounter: [id number]    IR has been consulted to evaluate the patient, determine the appropriate procedure, and whether or not a procedure can and should be performed regarding the care of Maria Guadalupe Desai  We were consulted by urology concerning L perirenal abscess, and to possibly perform a abscess drainage if medically appropriate for the patient  IP Consult to IR  Consult performed by: VENKAT Rivas  Consult ordered by: Pradip Gross MD        03/03/21      Assessment/Recommendation:     40year old male with pyelonephritis and prostate abscess which he was taken to the OR for on 3/1  CT scan from 2/28  demonstrated left lower pole 4 9 x 2 5 cm enhancing cystic lesion  Repeat CT scan redemonstrated left lesion  Patient continues to have left flank pain, WBCs still elevated at 22 68      - plan for CT guided left gabriella-renal abscess catheter 3/4  - NPO after MN  - will send for culture      Total time spent in review of data, discussion with requesting provider and rendering advice was 20 minutes  Patient or appropriate family member was verbally informed by urology of this consultative service on their behalf to provide more timely access to specialty care in lieu of an in person consultation  Verbal consent was obtained  Thank you for allowing Interventional Radiology to participate in the care of Maria Guadalupe Desai  Please don't hesitate to call or TigerText us with any questions       [address] [place] [address] Preston Grimaldo

## 2021-03-03 NOTE — ASSESSMENT & PLAN NOTE
· POA, secondary to above  · Patient presented with fever, tachycardia, leukocytosis  · Continue with IV cefepime  · Continue with above treatment  · Monitor

## 2021-03-03 NOTE — PROGRESS NOTES
Progress Note - urology  Heriberto Nunez 40 y o  male MRN: [id number]  Unit/Bed#: [address] 522-01 Encounter: [id number]    Assessment&Plan:  Pyelonephritis  -CT scan showed "Bilateral pyelonephritis with suspected superinfected infected cyst exophytic from the left lower pole measuring 4 9 x 2 5 cm "  -patient continued to have left flank pain, repeat CT scan of abdomen for re-evaluation of kidneys and renal cysts, currently pending   -based off of imaging, so determine if need for IR drainage  -NPO at midnight, should patient need IR drainage  -continue with medical optimization at this time  -leukocytosis elevated 22 68  -creatinine 0 42  -afebrile overnight & hemodynamically stable  Prostatic abscess:  -CT scan showed small enhancing abscesses in both lobes of the prostate gland with adjacent cystitis, findings consistent with complicated prostatitis with secondary ascending infection   -postop day 2 TURP for drainage of prostatic abscess  Matthews catheter currently in place, draining clear yellow urine   -will maintain Matthews catheter until tomorrow or Friday  Subjective/Objective   Chief Complaint:  Left-sided flank pain    Subjective:   Patient is a 43-year-old male admitted for sepsis secondary to pyelonephritis and prostatic abscess  Patient is postop day 2 for transurethral resection of the prostate for prostatic abscess  Patient's Matthews catheter in place draining clear yellow urine  Patient denies any fevers, chills, nausea, vomiting, or dysuria  Patient continues to have ongoing left-sided flank pain  Patient currently awaiting repeat CT scan for re-evaluation of his flank pain  Otherwise patient states he is currently doing well and denies any other further complaints  Objective:    Blood pressure 136/88, pulse 91, temperature 98 5 °F (36 9 °C), temperature source Oral, resp  rate 19, height 5' 5" (1 651 m), weight 52 2 kg (115 lb), SpO2 96 %  ,Body mass index is 19 14 kg/m²        Intake/Output Summary (Last 24 hours) at 3/3/2021 0946  Last data filed at 3/3/2021 0800  Gross per 24 hour   Intake 2555 91 ml   Output 4700 ml   Net -2144 09 ml       Invasive Devices     Peripheral Intravenous Line            Peripheral IV 02/28/21 Left Arm 2 days    Peripheral IV 02/28/21 Right Arm 2 days          Drain            Urethral Catheter Three way;Coude 24 Fr  1 day                Physical Exam  Constitutional:       General: He is not in acute distress  Appearance: Normal appearance  He is normal weight  He is not toxic-appearing  HENT:      Head: Normocephalic and atraumatic  Right Ear: External ear normal       Left Ear: External ear normal       Nose: Nose normal    Eyes:      General: No scleral icterus  Conjunctiva/sclera: Conjunctivae normal    Neck:      Musculoskeletal: Normal range of motion  Cardiovascular:      Rate and Rhythm: Normal rate and regular rhythm  Pulses: Normal pulses  Heart sounds: No murmur  No friction rub  No gallop  Pulmonary:      Effort: No respiratory distress  Breath sounds: Normal breath sounds  No wheezing, rhonchi or rales  Abdominal:      General: Bowel sounds are normal       Palpations: Abdomen is soft  Tenderness: There is abdominal tenderness  There is left CVA tenderness  Comments: Pelvic abdominal tenderness left CVA tenderness   Genitourinary:     Comments: Matthews catheter in place draining clear yellow urine  Musculoskeletal: Normal range of motion  Comments: No calf tenderness or edema   Skin:     General: Skin is warm and dry  Neurological:      General: No focal deficit present  Mental Status: He is alert and oriented to person, place, and time  Psychiatric:         Mood and Affect: Mood normal          Behavior: Behavior normal          Thought Content: Thought content normal          Judgment: Judgment normal          Lab, Imaging and other studies:I have personally reviewed pertinent lab results      Lab Results   Component Value Date    WBC 22 68 (H) 03/03/2021    HGB 11 1 (L) 03/03/2021    HCT 33 4 (L) 03/03/2021    MCV 86 03/03/2021     (H) 03/03/2021     Lab Results   Component Value Date    SODIUM 132 (L) 03/03/2021    K 3 8 03/03/2021     03/03/2021    CO2 27 03/03/2021    BUN 6 03/03/2021    CREATININE 0 42 (L) 03/03/2021    GLUC 270 (H) 03/03/2021    CALCIUM 8 5 03/03/2021       VTE Pharmacologic Prophylaxis: Heparin  VTE Mechanical Prophylaxis: sequential compression device      Yonatan Restrepo PA-C

## 2021-03-04 ENCOUNTER — APPOINTMENT (INPATIENT)
Dept: RADIOLOGY | Facility: HOSPITAL | Age: 46
DRG: 710 | End: 2021-03-04
Attending: INTERNAL MEDICINE
Payer: COMMERCIAL

## 2021-03-04 ENCOUNTER — APPOINTMENT (INPATIENT)
Dept: RADIOLOGY | Facility: HOSPITAL | Age: 46
DRG: 710 | End: 2021-03-04
Payer: COMMERCIAL

## 2021-03-04 PROBLEM — M25.511 ACUTE PAIN OF RIGHT SHOULDER: Status: ACTIVE | Noted: 2021-03-04

## 2021-03-04 LAB
ANION GAP SERPL CALCULATED.3IONS-SCNC: 5 MMOL/L (ref 4–13)
BUN SERPL-MCNC: 6 MG/DL (ref 5–25)
C PEPTIDE SERPL-MCNC: 1.2 NG/ML (ref 1.1–4.4)
CALCIUM SERPL-MCNC: 8.9 MG/DL (ref 8.3–10.1)
CHLORIDE SERPL-SCNC: 102 MMOL/L (ref 100–108)
CO2 SERPL-SCNC: 29 MMOL/L (ref 21–32)
CREAT SERPL-MCNC: 0.44 MG/DL (ref 0.6–1.3)
ERYTHROCYTE [DISTWIDTH] IN BLOOD BY AUTOMATED COUNT: 11.6 % (ref 11.6–15.1)
GAD65 AB SER-ACNC: <5 U/ML (ref 0–5)
GFR SERPL CREATININE-BSD FRML MDRD: 138 ML/MIN/1.73SQ M
GLUCOSE SERPL-MCNC: 186 MG/DL (ref 65–140)
GLUCOSE SERPL-MCNC: 207 MG/DL (ref 65–140)
GLUCOSE SERPL-MCNC: 243 MG/DL (ref 65–140)
GLUCOSE SERPL-MCNC: 281 MG/DL (ref 65–140)
HCT VFR BLD AUTO: 35.6 % (ref 36.5–49.3)
HGB BLD-MCNC: 11.8 G/DL (ref 12–17)
INR PPP: 0.96 (ref 0.84–1.19)
MCH RBC QN AUTO: 28.4 PG (ref 26.8–34.3)
MCHC RBC AUTO-ENTMCNC: 33.1 G/DL (ref 31.4–37.4)
MCV RBC AUTO: 86 FL (ref 82–98)
PANC ISLET CELL AB TITR SER: NEGATIVE {TITER}
PLATELET # BLD AUTO: 496 THOUSANDS/UL (ref 149–390)
PMV BLD AUTO: 8.4 FL (ref 8.9–12.7)
POTASSIUM SERPL-SCNC: 4.6 MMOL/L (ref 3.5–5.3)
PROTHROMBIN TIME: 12.8 SECONDS (ref 11.6–14.5)
RBC # BLD AUTO: 4.15 MILLION/UL (ref 3.88–5.62)
SODIUM SERPL-SCNC: 136 MMOL/L (ref 136–145)
WBC # BLD AUTO: 19 THOUSAND/UL (ref 4.31–10.16)

## 2021-03-04 PROCEDURE — 73120 X-RAY EXAM OF HAND: CPT

## 2021-03-04 PROCEDURE — 87186 SC STD MICRODIL/AGAR DIL: CPT | Performed by: UROLOGY

## 2021-03-04 PROCEDURE — 82948 REAGENT STRIP/BLOOD GLUCOSE: CPT

## 2021-03-04 PROCEDURE — 80048 BASIC METABOLIC PNL TOTAL CA: CPT | Performed by: INTERNAL MEDICINE

## 2021-03-04 PROCEDURE — 87070 CULTURE OTHR SPECIMN AEROBIC: CPT | Performed by: UROLOGY

## 2021-03-04 PROCEDURE — NC001 PR NO CHARGE: Performed by: STUDENT IN AN ORGANIZED HEALTH CARE EDUCATION/TRAINING PROGRAM

## 2021-03-04 PROCEDURE — 77012 CT SCAN FOR NEEDLE BIOPSY: CPT

## 2021-03-04 PROCEDURE — 99024 POSTOP FOLLOW-UP VISIT: CPT | Performed by: UROLOGY

## 2021-03-04 PROCEDURE — 99152 MOD SED SAME PHYS/QHP 5/>YRS: CPT

## 2021-03-04 PROCEDURE — 73030 X-RAY EXAM OF SHOULDER: CPT

## 2021-03-04 PROCEDURE — 0T9130Z DRAINAGE OF LEFT KIDNEY WITH DRAINAGE DEVICE, PERCUTANEOUS APPROACH: ICD-10-PCS | Performed by: STUDENT IN AN ORGANIZED HEALTH CARE EDUCATION/TRAINING PROGRAM

## 2021-03-04 PROCEDURE — 49406 IMAGE CATH FLUID PERI/RETRO: CPT | Performed by: STUDENT IN AN ORGANIZED HEALTH CARE EDUCATION/TRAINING PROGRAM

## 2021-03-04 PROCEDURE — 85610 PROTHROMBIN TIME: CPT | Performed by: INTERNAL MEDICINE

## 2021-03-04 PROCEDURE — 49405 IMAGE CATH FLUID COLXN VISC: CPT

## 2021-03-04 PROCEDURE — 85027 COMPLETE CBC AUTOMATED: CPT | Performed by: INTERNAL MEDICINE

## 2021-03-04 PROCEDURE — 99232 SBSQ HOSP IP/OBS MODERATE 35: CPT | Performed by: INTERNAL MEDICINE

## 2021-03-04 PROCEDURE — 99153 MOD SED SAME PHYS/QHP EA: CPT

## 2021-03-04 PROCEDURE — 73200 CT UPPER EXTREMITY W/O DYE: CPT

## 2021-03-04 PROCEDURE — 99152 MOD SED SAME PHYS/QHP 5/>YRS: CPT | Performed by: STUDENT IN AN ORGANIZED HEALTH CARE EDUCATION/TRAINING PROGRAM

## 2021-03-04 PROCEDURE — C1729 CATH, DRAINAGE: HCPCS

## 2021-03-04 PROCEDURE — 87205 SMEAR GRAM STAIN: CPT | Performed by: UROLOGY

## 2021-03-04 RX ORDER — SODIUM CHLORIDE 9 MG/ML
10 INJECTION INTRAVENOUS DAILY
Qty: 30 SYRINGE | Refills: 3 | Status: SHIPPED | OUTPATIENT
Start: 2021-03-04 | End: 2021-03-09 | Stop reason: HOSPADM

## 2021-03-04 RX ORDER — OXYCODONE HYDROCHLORIDE 10 MG/1
10 TABLET ORAL EVERY 4 HOURS PRN
Status: DISCONTINUED | OUTPATIENT
Start: 2021-03-04 | End: 2021-03-09 | Stop reason: HOSPADM

## 2021-03-04 RX ORDER — HYDROMORPHONE HCL/PF 1 MG/ML
1 SYRINGE (ML) INJECTION EVERY 4 HOURS PRN
Status: DISCONTINUED | OUTPATIENT
Start: 2021-03-04 | End: 2021-03-09 | Stop reason: HOSPADM

## 2021-03-04 RX ORDER — MIDAZOLAM HYDROCHLORIDE 2 MG/2ML
INJECTION, SOLUTION INTRAMUSCULAR; INTRAVENOUS CODE/TRAUMA/SEDATION MEDICATION
Status: COMPLETED | OUTPATIENT
Start: 2021-03-04 | End: 2021-03-04

## 2021-03-04 RX ORDER — FENTANYL CITRATE 50 UG/ML
INJECTION, SOLUTION INTRAMUSCULAR; INTRAVENOUS CODE/TRAUMA/SEDATION MEDICATION
Status: COMPLETED | OUTPATIENT
Start: 2021-03-04 | End: 2021-03-04

## 2021-03-04 RX ORDER — INSULIN GLARGINE 100 [IU]/ML
28 INJECTION, SOLUTION SUBCUTANEOUS
Status: DISCONTINUED | OUTPATIENT
Start: 2021-03-04 | End: 2021-03-05

## 2021-03-04 RX ADMIN — FENTANYL CITRATE 50 MCG: 50 INJECTION INTRAMUSCULAR; INTRAVENOUS at 12:48

## 2021-03-04 RX ADMIN — CEFEPIME HYDROCHLORIDE 2000 MG: 2 INJECTION, POWDER, FOR SOLUTION INTRAVENOUS at 12:22

## 2021-03-04 RX ADMIN — ACETAMINOPHEN 975 MG: 325 TABLET ORAL at 21:33

## 2021-03-04 RX ADMIN — ACETAMINOPHEN 975 MG: 325 TABLET ORAL at 05:28

## 2021-03-04 RX ADMIN — GABAPENTIN 300 MG: 300 CAPSULE ORAL at 08:00

## 2021-03-04 RX ADMIN — GABAPENTIN 300 MG: 300 CAPSULE ORAL at 17:12

## 2021-03-04 RX ADMIN — OXYCODONE HYDROCHLORIDE 10 MG: 10 TABLET ORAL at 21:33

## 2021-03-04 RX ADMIN — CEFEPIME HYDROCHLORIDE 2000 MG: 2 INJECTION, POWDER, FOR SOLUTION INTRAVENOUS at 23:07

## 2021-03-04 RX ADMIN — INSULIN LISPRO 1 UNITS: 100 INJECTION, SOLUTION INTRAVENOUS; SUBCUTANEOUS at 12:38

## 2021-03-04 RX ADMIN — HYDROMORPHONE HYDROCHLORIDE 1 MG: 1 INJECTION, SOLUTION INTRAMUSCULAR; INTRAVENOUS; SUBCUTANEOUS at 18:03

## 2021-03-04 RX ADMIN — NICOTINE 21 MG: 21 PATCH, EXTENDED RELEASE TRANSDERMAL at 17:12

## 2021-03-04 RX ADMIN — OXYCODONE HYDROCHLORIDE 5 MG: 5 TABLET ORAL at 15:23

## 2021-03-04 RX ADMIN — INSULIN LISPRO 1 UNITS: 100 INJECTION, SOLUTION INTRAVENOUS; SUBCUTANEOUS at 17:13

## 2021-03-04 RX ADMIN — SODIUM CHLORIDE 125 ML/HR: 0.9 INJECTION, SOLUTION INTRAVENOUS at 02:26

## 2021-03-04 RX ADMIN — MIDAZOLAM 1 MG: 1 INJECTION INTRAMUSCULAR; INTRAVENOUS at 12:43

## 2021-03-04 RX ADMIN — INSULIN GLARGINE 28 UNITS: 100 INJECTION, SOLUTION SUBCUTANEOUS at 21:33

## 2021-03-04 RX ADMIN — INSULIN LISPRO 2 UNITS: 100 INJECTION, SOLUTION INTRAVENOUS; SUBCUTANEOUS at 21:34

## 2021-03-04 RX ADMIN — HEPARIN SODIUM 5000 UNITS: 5000 INJECTION INTRAVENOUS; SUBCUTANEOUS at 05:28

## 2021-03-04 RX ADMIN — FENTANYL CITRATE 50 MCG: 50 INJECTION INTRAMUSCULAR; INTRAVENOUS at 12:39

## 2021-03-04 RX ADMIN — HEPARIN SODIUM 5000 UNITS: 5000 INJECTION INTRAVENOUS; SUBCUTANEOUS at 13:52

## 2021-03-04 RX ADMIN — SODIUM CHLORIDE 125 ML/HR: 0.9 INJECTION, SOLUTION INTRAVENOUS at 18:07

## 2021-03-04 RX ADMIN — ACETAMINOPHEN 975 MG: 325 TABLET ORAL at 13:52

## 2021-03-04 RX ADMIN — HEPARIN SODIUM 5000 UNITS: 5000 INJECTION INTRAVENOUS; SUBCUTANEOUS at 21:33

## 2021-03-04 RX ADMIN — MIDAZOLAM 1 MG: 1 INJECTION INTRAMUSCULAR; INTRAVENOUS at 12:38

## 2021-03-04 RX ADMIN — OXYCODONE HYDROCHLORIDE 5 MG: 5 TABLET ORAL at 05:28

## 2021-03-04 RX ADMIN — GABAPENTIN 300 MG: 300 CAPSULE ORAL at 21:32

## 2021-03-04 NOTE — QUICK NOTE
Patient's chart review, blood sugars still are above goal   Currently on Lantus 25 units q h s  And Humalog 8 units t i d  A c  And correctional sliding scale  Will increase Lantus to 28 units q h s  And his Humalog to 10 units t i d  A c  And continue correctional sliding scale      Continue to monitor blood sugar over time and make adjustments to the regimen if necessary

## 2021-03-04 NOTE — ASSESSMENT & PLAN NOTE
-repeat CT scan 03/03/2021:Minimal increase in size of multiloculated, septated rim-enhancing lesion arising from the lower pole the left kidney, suspicious for superinfected exophytic cyst Kaaren Afshin  Multifocal pyelonephritis and right hydronephrosis have improved    -IR today for percutaneous perirenal abscess drain placement, scant bloody drainage noted  -continue with medical optimization at this time  -continue broad-spectrum antibiotics  -leukocytosis , WBC 19 (22 68 on 03/03/2021), continue to trend WBCs  -creatinine 0 44  -afebrile overnight & hemodynamically stable   -tentatively planning on discontinuing Matthews catheter on Friday

## 2021-03-04 NOTE — PROGRESS NOTES
Progress Note - Urology  Adi Kaba 1975, 40 y o  male MRN: [id number]    Unit/Bed#: [facility] 522-01 Encounter: [id number]    * Pyelonephritis  Assessment & Plan  -repeat CT scan 03/03/2021:Minimal increase in size of multiloculated, septated rim-enhancing lesion arising from the lower pole the left kidney, suspicious for superinfected exophytic cyst Jhon Gonzalez  Multifocal pyelonephritis and right hydronephrosis have improved  -IR today for percutaneous perirenal abscess drain placement, scant bloody drainage noted  -continue with medical optimization at this time  -continue broad-spectrum antibiotics  -leukocytosis , WBC 19 (22 68 on 03/03/2021), continue to trend WBCs  -creatinine 0 44  -afebrile overnight & hemodynamically stable   -tentatively planning on discontinuing Matthews catheter on Friday        Subjective:     24 HR EVENTS:   Patient presented to IR today and had10 Fr L perirenal abscess drain returned 15 mls of bloody pus  Patient has  complaints of Discomfort to percutaneous drain site  Review of Systems   Constitutional: Negative for activity change, appetite change, chills, fatigue, fever and unexpected weight change  HENT: Negative for facial swelling  Eyes: Negative for discharge  Respiratory: Negative  Negative for cough and shortness of breath  Cardiovascular: Negative for chest pain and leg swelling  Gastrointestinal: Positive for constipation  Negative for abdominal distention, abdominal pain, diarrhea, nausea and vomiting  Reports his last bowel movement was about 10 days ago   Endocrine: Negative  Genitourinary: Negative  Negative for decreased urine volume, dysuria, enuresis, flank pain, genital sores and hematuria  Matthews catheter   Musculoskeletal: Negative for back pain and myalgias  Skin: Negative for pallor and rash  Allergic/Immunologic: Negative  Negative for immunocompromised state     Neurological: Negative for facial asymmetry and speech difficulty  Psychiatric/Behavioral: Negative for agitation and confusion  Objective:  Nursing Rounds:   Vitals: Blood pressure 117/71, pulse (!) 106, temperature 98 3 °F (36 8 °C), temperature source Oral, resp  rate 18, height 5' 5" (1 651 m), weight 52 2 kg (115 lb), SpO2 94 %  ,Body mass index is 19 14 kg/m²  INS & OUTS:  I/O last 24 hours: In: 2385 [P O :1380; I V :1005]  Out: 42402 [Urine:74519]    Physical Exam  Vitals signs and nursing note reviewed  Constitutional:       General: He is not in acute distress  Appearance: Normal appearance  He is not ill-appearing, toxic-appearing or diaphoretic  HENT:      Head: Normocephalic  Neck:      Musculoskeletal: Normal range of motion  Cardiovascular:      Rate and Rhythm: Normal rate  Pulmonary:      Effort: Pulmonary effort is normal  No respiratory distress  Abdominal:      General: Abdomen is flat  There is no distension  Palpations: Abdomen is soft  Tenderness: There is no abdominal tenderness  There is no guarding or rebound  Genitourinary:     Comments: Matthews catheter draining clear yellow urine  Drain for left perirenal abscess to bulb suction with a scant amount of bloody drainage in the tubing  Patient reports tenderness around the site  Musculoskeletal:         General: No swelling  Right lower leg: No edema  Left lower leg: No edema  Skin:     General: Skin is warm and dry  Neurological:      General: No focal deficit present  Mental Status: He is alert and oriented to person, place, and time  Psychiatric:         Mood and Affect: Mood normal          Behavior: Behavior normal          Thought Content: Thought content normal          Judgment: Judgment normal          Imaging:  CT - ABDOMEN WITHOUT AND WITH IV CONTRAST     INDICATION:   Sepsis  With/Without renal abscess      COMPARISON: 2/28/2021     TECHNIQUE: CT examination of the abdomen was performed   Reformatted images were created in axial, sagittal, and coronal planes        Radiation dose length product (DLP) for this visit:  713 54 mGy-cm   This examination, like all CT scans performed in the [facility], was performed utilizing techniques to minimize radiation dose exposure, including the use of iterative   reconstruction and automated exposure control      IV Contrast:  100 mL of iohexol (OMNIPAQUE)  Enteric Contrast:     FINDINGS:     ABDOMEN     LOWER CHEST:  Bibasilar atelectasis and small bilateral pleural effusions are present      LIVER/BILIARY TREE:  Unremarkable      GALLBLADDER:  No calcified gallstones  No pericholecystic inflammatory change      SPLEEN:  Unremarkable      PANCREAS:  Unremarkable      ADRENAL GLANDS:  Unremarkable      KIDNEYS/URETERS:  Multiloculated, septated rim-enhancing lesion arising from the lower pole left kidney is again seen and measures 4 8 cm transverse by 5 5 cm craniocaudal by 3 2 cm anterior posterior, previously measuring 4 4 x 5 2 x 2 8 cm respectively   there remains wedge-shaped area of hypoattenuation within the upper pole of the left kidney  Multifocal wedge-shaped areas of hypoattenuation within the kidneys have improved since the prior study, consistent with improved pyelonephritis  Right-sided   hydronephrosis is also slightly improved      VISUALIZED STOMACH AND BOWEL:  Unremarkable      VISUALIZED ABDOMINAL CAVITY:  No pathologically enlarged periportal, mesenteric or upper retroperitoneal lymph nodes  Trace ascites is present    No visualized free intraperitoneal air      VISUALIZED BODY WALL:  Unremarkable      VISUALIZED ABDOMINAL VESSELS: No aneurysm      OSSEOUS STRUCTURES:  No acute fracture or destructive osseous lesion      IMPRESSION:     Minimal increase in size of multiloculated, septated rim-enhancing lesion arising from the lower pole the left kidney, suspicious for superinfected exophytic cyst Michelle Pott      Multifocal pyelonephritis and right hydronephrosis have improved      Imaging reviewed - both report and images personally reviewed  Labs:  Recent Labs     03/02/21  0308 03/03/21  0434 03/04/21  0537   WBC 22 81* 22 68* 19 00*       Recent Labs     03/02/21  0308 03/03/21  0434 03/04/21  0537   HGB 11 1* 11 1* 11 8*     Recent Labs     03/02/21  0308 03/03/21  0434 03/04/21  0537   HCT 33 3* 33 4* 35 6*     Recent Labs     03/02/21  0308 03/03/21  0434 03/04/21  0537   CREATININE 0 42* 0 42* 0 44*     Lab Results   Component Value Date    HGB 11 8 (L) 03/04/2021    HCT 35 6 (L) 03/04/2021    WBC 19 00 (H) 03/04/2021     (H) 03/04/2021     Lab Results   Component Value Date    K 4 6 03/04/2021     03/04/2021    CO2 29 03/04/2021    BUN 6 03/04/2021    CREATININE 0 44 (L) 03/04/2021    CALCIUM 8 9 03/04/2021     Urinalysis:   Urine Culture: Growth: [id number] CFU/mL Staphylococcus aureus    History:    Past Medical History:   Diagnosis Date    [facility]     type 2    Hypertension     Psychiatric disorder     depression     Past Surgical History:   Procedure Laterality Date    CYSTOSCOPY N/A 3/1/2021    Procedure: CYSTOSCOPY;  Surgeon: Francheska Holt MD;  Location: [facility];  Service: Urology    TRANSURETHRAL RESECTION OF PROSTATE N/A 3/1/2021    Procedure: TRANSURETHRAL RESECTION OF URETHERAL PROSTATE ABSCESS(TURP);   Surgeon: Francheska Holt MD;  Location[facility];  Service: Urology     Family History   Problem Relation Age of Onset    Diabetes Mother     Diabetes Maternal Grandmother      Social History     Socioeconomic History    Marital status: Single     Spouse name: None    Number of children: None    Years of education: None    Highest education level: None   Occupational History    None   Social Needs    Financial resource strain: None    Food insecurity     Worry: None     Inability: None    Transportation needs     Medical: None     Non-medical: None   Tobacco Use    Smoking status: Current Every Day Smoker     Packs/day: 0 50     Types: Cigarettes    Smokeless tobacco: Never Used   Substance and Sexual Activity    Alcohol use: Not Currently     Comment: occasionally    Drug use: No    Sexual activity: Never   Lifestyle    Physical activity     Days per week: None     Minutes per session: None    Stress: None   Relationships    Social connections     Talks on phone: None     Gets together: None     Attends Spiritism service: None     Active member of club or organization: None     Attends meetings of clubs or organizations: None     Relationship status: None    Intimate partner violence     Fear of current or ex partner: None     Emotionally abused: None     Physically abused: None     Forced sexual activity: None   Other Topics Concern    None   Social History Narrative    None       The following portions of the patient's history were reviewed and updated as appropriate: allergies, current medications, past family history, past medical history, past social history, past surgical history and problem list    VENKAT Sparrow  Date: 3/4/2021 Time: 4:04 PM

## 2021-03-04 NOTE — PLAN OF CARE
Problem: Potential for Falls  Goal: Patient will remain free of falls  Description: INTERVENTIONS:  - Assess patient frequently for physical needs  -  Identify cognitive and physical deficits and behaviors that affect risk of falls  -  [place] fall precautions as indicated by assessment   - Educate patient/family on patient safety including physical limitations  - Instruct patient to call for assistance with activity based on assessment  - Modify environment to reduce risk of injury  - Consider OT/PT consult to assist with strengthening/mobility  Outcome: Progressing     Problem: Prexisting or High Potential for Compromised Skin Integrity  Goal: Skin integrity is maintained or improved  Description: INTERVENTIONS:  - Identify patients at risk for skin breakdown  - Assess and monitor skin integrity  - Assess and monitor nutrition and hydration status  - Monitor labs   - Assess for incontinence   - Turn and reposition patient  - Assist with mobility/ambulation  - Relieve pressure over bony prominences  - Avoid friction and shearing  - Provide appropriate hygiene as needed including keeping skin clean and dry  - Evaluate need for skin moisturizer/barrier cream  - Collaborate with interdisciplinary team   - Patient/family teaching  - Consider wound care consult   Outcome: Progressing     Problem: Nutrition/Hydration-ADULT  Goal: Nutrient/Hydration intake appropriate for improving, restoring or maintaining nutritional needs  Description: Monitor and assess patient's nutrition/hydration status for malnutrition  Collaborate with interdisciplinary team and initiate plan and interventions as ordered  Monitor patient's weight and dietary intake as ordered or per policy  Utilize nutrition screening tool and intervene as necessary  Determine patient's food preferences and provide high-protein, high-caloric foods as appropriate       INTERVENTIONS:  - Monitor oral intake, urinary output, labs, and treatment plans  - Assess nutrition and hydration status and recommend course of action  - Evaluate amount of meals eaten  - Assist patient with eating if necessary   - Allow adequate time for meals  - Recommend/ encourage appropriate diets, oral nutritional supplements, and vitamin/mineral supplements  - Order, calculate, and assess calorie counts as needed  - Recommend, monitor, and adjust tube feedings and TPN/PPN based on assessed needs  - Assess need for intravenous fluids  - Provide specific nutrition/hydration education as appropriate  - Include patient/family/caregiver in decisions related to nutrition  Outcome: Progressing

## 2021-03-04 NOTE — BRIEF OP NOTE (RAD/CATH)
INTERVENTIONAL RADIOLOGY PROCEDURE NOTE    Date: 3/4/2021    Procedure: L perirenal abscess drain    Preoperative diagnosis:   1  Abscess of prostate    2  DKA (diabetic ketoacidoses) ([facility] )    3  Sepsis ([facility] )    4  Pyelonephritis    5  Type 2 diabetes mellitus with hyperglycemia, with long-term current use of insulin ([facility])         Postoperative diagnosis: Same  Surgeon: Georgena Boxer, MD     Assistant: None  No qualified resident was available  Blood loss: 2 ml    Specimens: Sent to lab     Findings:   10 Fr L perirenal abscess drain returned 15 mls of bloody pus  If output remains low, upsizing and repositioning into a different locule may be necessary  Complications: None immediate      Anesthesia: conscious sedation

## 2021-03-04 NOTE — PROGRESS NOTES
Progress Note - [place] 1975, 40 y o  male MRN: [id number]    Unit/Bed#: [facility] 522-01 Encounter: [id number]    Primary Care Provider: Rafia Bonilla DO   Date and time admitted to hospital: 2/28/2021 11:44 AM        * Pyelonephritis  Assessment & Plan  · Patient with underlying poorly-controlled diabetes, poor insulin compliance, presented with bilateral back pain, fatigue, dysuria, and hematuria    · CT scan with Bilateral pyelonephritis with suspected superinfected infected cyst exophytic from the left lower pole measuring 4 9 x 2 5 cm  Small enhancing abscesses in both lobes of the prostate gland with adjacent cystitis, findings consistent with complicated prostatitis with secondary ascending infection  · Status post cystoscopy with drainage of prostatic abscess  · Urine cultures positive for MSSA, will continue cefepime    · CT scan  with persistent renal abscess  · S/p Ir drainage of perirenal abscess  · Monitor drain output  · Await cultures from drain output    Prostate abscess  Assessment & Plan  Management as above    Sepsis ([address] )  Assessment & Plan  · POA, secondary to above  · Patient presented with fever, tachycardia, leukocytosis  · Continue with IV cefepime  · Continue with above treatment  · Monitor    Hyponatremia  Assessment & Plan  · Secondary to above and decreasing oral intake  · resolved    Tobacco abuse  Assessment & Plan  · Agreeable to nicotine patch at this time    Acute pain of right shoulder  Assessment & Plan  · Localized to right suprascapular area  · Persistent for multiple days  · No recent history of injury per patient  · Check Ct of scapula to evaluate for injury or abscess, given his sepsis    Type 2 diabetes mellitus with hyperglycemia, with long-term current use of insulin [facility])  Assessment & Plan  Lab Results   Component Value Date    HGBA1C >14 0 (H) 03/01/2021       Recent Labs     03/03/21  1613 03/03/21  2103 03/04/21  0640 03/04/21  1114   POCGLU 235* 267* 281* 207*       Blood Sugar Average: Last 72 hrs:  (P) 809 [id number]   Poorly-controlled  Noncompliance  Endocrinology following, adjusting basal bolus insulin        VTE Pharmacologic Prophylaxis:   Pharmacologic: Heparin  Mechanical VTE Prophylaxis in Place: Yes    Patient Centered Rounds: I have performed bedside rounds with nursing staff today  Education and Discussions with Family / Patient: patient, plan of care    Time Spent for Care: 20 minutes  More than 50% of total time spent on counseling and coordination of care as described above  Current Length of Stay: 4 day(s)    Current Patient Status: Inpatient   Certification Statement: The patient will continue to require additional inpatient hospital stay due to monitor drain output, await culture    Discharge Plan: home when stable    Code Status: Level 1 - Full Code      Subjective:   Reports flank pain and scapular pain    Objective:     Vitals:   Temp (24hrs), Av 4 °F (36 9 °C), Min:98 3 °F (36 8 °C), Max:98 7 °F (37 1 °C)    Temp:  [98 3 °F (36 8 °C)-98 7 °F (37 1 °C)] 98 3 °F (36 8 °C)  HR:  [] 106  Resp:  [16-20] 18  BP: (110-164)/() 117/71  SpO2:  [92 %-100 %] 94 %  Body mass index is 19 14 kg/m²  Input and Output Summary (last 24 hours): Intake/Output Summary (Last 24 hours) at 3/4/2021 1546  Last data filed at 3/4/2021 1401  Gross per 24 hour   Intake 1905 ml   Output 6800 ml   Net -4895 ml       Physical Exam:     Physical Exam  Constitutional:       Appearance: Normal appearance  HENT:      Head: Normocephalic and atraumatic  Nose: Nose normal       Mouth/Throat:      Mouth: Mucous membranes are moist    Eyes:      Extraocular Movements: Extraocular movements intact  Cardiovascular:      Rate and Rhythm: Normal rate and regular rhythm  Pulmonary:      Effort: Pulmonary effort is normal       Breath sounds: No wheezing or rales  Abdominal:      General: Abdomen is flat  Tenderness:  There is right CVA tenderness and left CVA tenderness  Genitourinary:     Comments: Matthews catheter with clear yellow urine  Musculoskeletal:         General: Tenderness present  No deformity or signs of injury  Skin:     General: Skin is warm and dry  Findings: No erythema  Neurological:      General: No focal deficit present  Mental Status: He is alert and oriented to person, place, and time  Additional Data:     Labs:    Results from last 7 days   Lab Units 03/04/21  0537  03/02/21  0308  02/28/21  1234   WBC Thousand/uL 19 00*   < > 22 81*   < > 27 00*   HEMOGLOBIN g/dL 11 8*   < > 11 1*   < > 14 5   HEMATOCRIT % 35 6*   < > 33 3*   < > 41 7   PLATELETS Thousands/uL 496*   < > 433*   < > 562*   BANDS PCT %  --   --   --   --  3   NEUTROS PCT %  --   --  80*   < >  --    LYMPHS PCT %  --   --  8*   < >  --    LYMPHO PCT %  --   --   --   --  2*   MONOS PCT %  --   --  10   < >  --    MONO PCT %  --   --   --   --  8   EOS PCT %  --   --  0   < > 2    < > = values in this interval not displayed  Results from last 7 days   Lab Units 03/04/21  0537  03/01/21  0445   SODIUM mmol/L 136   < > 137   POTASSIUM mmol/L 4 6   < > 3 8   CHLORIDE mmol/L 102   < > 103   CO2 mmol/L 29   < > 29   BUN mg/dL 6   < > 8   CREATININE mg/dL 0 44*   < > 0 42*   ANION GAP mmol/L 5   < > 5   CALCIUM mg/dL 8 9   < > 9 1   ALBUMIN g/dL  --   --  2 0*   TOTAL BILIRUBIN mg/dL  --   --  0 16*   ALK PHOS U/L  --   --  157*   ALT U/L  --   --  <6*   AST U/L  --   --  7   GLUCOSE RANDOM mg/dL 243*   < > 124    < > = values in this interval not displayed       Results from last 7 days   Lab Units 03/04/21  0537   INR  0 96     Results from last 7 days   Lab Units 03/04/21  1114 03/04/21  0640 03/03/21  2103 03/03/21  1613 03/03/21  1113 03/03/21  0622 03/02/21  2104 03/02/21  1517 03/02/21  1132 03/02/21  0632 03/01/21  2217 03/01/21 2005   POC GLUCOSE mg/dl 207* 281* 267* 235* 300* 270* 331* 315* 334* 289* 162* 123     Results from last 7 days   Lab Units 03/01/21  0445   HEMOGLOBIN A1C % >14 0*     Results from last 7 days   Lab Units 03/01/21  0024 02/28/21  1234   LACTIC ACID mmol/L  --  1 5   PROCALCITONIN ng/ml 0 50* 0 35*           * I Have Reviewed All Lab Data Listed Above  * Additional Pertinent Lab Tests Reviewed: All Labs Within Last 24 Hours Reviewed      Recent Cultures (last 7 days):     Results from last 7 days   Lab Units 03/01/21  1100 02/28/21  1244 02/28/21  1235 02/28/21  1234   BLOOD CULTURE   --   --  No Growth at 72 hrs  No Growth at 72 hrs  URINE CULTURE  70,000-79,000 cfu/ml Staphylococcus aureus* >100,000 cfu/ml Staphylococcus aureus*  --   --        Last 24 Hours Medication List:   Current Facility-Administered Medications   Medication Dose Route Frequency Provider Last Rate    acetaminophen  975 mg Oral Q8H [facility] Armando Anderson MD      cefepime  2,000 mg Intravenous Q12H Armando Anderson MD 2,000 mg (03/04/21 1222)    Diclofenac Sodium  4 g Topical 4x Daily Jerald López MD      gabapentin  300 mg Oral TID Jerald López MD      heparin (porcine)  5,000 Units Subcutaneous [facility] Armando Anderson MD      HYDROmorphone  1 mg Intravenous Q4H PRN Armando Anderson MD      insulin glargine  28 Units Subcutaneous HS Salvador Al MD      insulin lispro  1-5 Units Subcutaneous TID [facility] Salvador Al MD      insulin lispro  1-5 Units Subcutaneous HS Salvador Al MD      insulin lispro  10 Units Subcutaneous TID With Meals Salvador Al MD      nicotine  21 mg Transdermal Daily eJrald López MD      oxyCODONE  5 mg Oral Q4H PRN Armando Anderson MD      senna  2 tablet Oral Daily Jerald López MD      sodium chloride  125 mL/hr Intravenous Continuous Armando Anderson  mL/hr (03/04/21 0226)        Today, Patient Was Seen By: Tera Perez MD    ** Please Note: Dictation voice to text software may have been used in the creation of this document   **

## 2021-03-04 NOTE — CONSULTS
Orthopedics   Maria Antonia Santiago 40 y o  male MRN: [id number]  Unit/Bed#: [facility] 522-01      Chief Complaint:   right arm and shoulder pain    HPI:   40 y o  right hand dominant male without history of recent fall complaining of right shoulder pain  The patient was admitted for pyelonephritis, abscess of the prostate and DKA (his last A1c >14)  He states that his pain began several weeks  It is worse with movement and better with rest  He has baseline neuropathy  He has had fevers or chills  With IR drainage of his perirenal abscess  The patient states that he did have a fall on the right shoulder 3 months ago, but was told at that time he had no fracture after imaging studies were obtained    Review Of Systems:   · Skin: Normal  · Neuro: See HPI  · Musculoskeletal: See HPI  · 14 point review of systems negative except as stated above     Past Medical History:   Past Medical History:   Diagnosis Date    Diabetes mellitus ([facility] [address] )     type 2    Hypertension     Psychiatric disorder     depression       Past Surgical History:   Past Surgical History:   Procedure Laterality Date    CYSTOSCOPY N/A 3/1/2021    Procedure: CYSTOSCOPY;  Surgeon: Saige Naidu MD;  [facility]: [facility];  Service: Urology    TRANSURETHRAL RESECTION OF PROSTATE N/A 3/1/2021    Procedure: TRANSURETHRAL RESECTION OF URETHERAL PROSTATE ABSCESS(TURP);   Surgeon: Saige Naidu MD;  Location[facility];  Service: Urology       Family History:  Family history reviewed and non-contributory  Family History   Problem Relation Age of Onset    Diabetes Mother     Diabetes Maternal Grandmother        Social History:  Social History     Socioeconomic History    Marital status: Single     Spouse name: None    Number of children: None    Years of education: None    Highest education level: None   Occupational History    None   Social Needs    Financial resource strain: None    Food insecurity     Worry: None     Inability: None    Transportation needs     Medical: None     Non-medical: None   Tobacco Use    Smoking status: Current Every Day Smoker     Packs/day: 0 50     Types: Cigarettes    Smokeless tobacco: Never Used   Substance and Sexual Activity    Alcohol use: Not Currently     Comment: occasionally    Drug use: No    Sexual activity: Never   Lifestyle    Physical activity     Days per week: None     Minutes per session: None    Stress: None   Relationships    Social connections     Talks on phone: None     Gets together: None     Attends [employer] service: None     Active member of club or organization: None     Attends meetings of clubs or organizations: None     Relationship status: None    Intimate partner violence     Fear of current or ex partner: None     Emotionally abused: None     Physically abused: None     Forced sexual activity: None   Other Topics Concern    None   Social History Narrative    None       Allergies:    Allergies   Allergen Reactions    Penicillins            Labs:  0   Lab Value Date/Time    HCT 35 6 (L) 03/04/2021 0537    HCT 33 4 (L) 03/03/2021 0434    HCT 33 3 (L) 03/02/2021 0308    HGB 11 8 (L) 03/04/2021 0537    HGB 11 1 (L) 03/03/2021 0434    HGB 11 1 (L) 03/02/2021 0308    INR 0 96 03/04/2021 0537    WBC 19 00 (H) 03/04/2021 0537    WBC 22 68 (H) 03/03/2021 0434    WBC 22 81 (H) 03/02/2021 0308       Meds:    Current Facility-Administered Medications:     acetaminophen (TYLENOL) tablet 975 mg, 975 mg, Oral, Q8H [facility], Noemi Stover MD, 975 mg at 03/04/21 1352    cefepime (MAXIPIME) 2 g/50 mL dextrose IVPB, 2,000 mg, Intravenous, Q12H, Noemi Stover MD, Last Rate: 100 mL/hr at 03/04/21 1222, 2,000 mg at 03/04/21 1222    Diclofenac Sodium (VOLTAREN) 1 % topical gel 4 g, 4 g, Topical, 4x Daily, Mary Anne Loredo MD, 4 g at 03/03/21 1729    gabapentin (NEURONTIN) capsule 300 mg, 300 mg, Oral, TID, Mary Anne Loredo MD, 300 mg at 03/04/21 1712    heparin (porcine) subcutaneous injection 5,000 Units, 5,000 Units, Subcutaneous, Q8H [facility], 5,000 Units at 03/04/21 1352 **AND** [COMPLETED] Platelet count, , , Once, Edna Number, DO    HYDROmorphone (DILAUDID) injection 1 mg, 1 mg, Intravenous, Q4H PRN, Francheska Holt MD, 1 mg at 03/03/21 1332    insulin glargine (LANTUS) subcutaneous injection 28 Units 0 28 mL, 28 Units, Subcutaneous, HS, Denis Jerry MD    insulin lispro (HumaLOG) 100 units/mL subcutaneous injection 1-5 Units, 1-5 Units, Subcutaneous, TID AC, 1 Units at 03/04/21 1713 **AND** Fingerstick Glucose (POCT), , , TID AC, Denis Jerry MD    insulin lispro (HumaLOG) 100 units/mL subcutaneous injection 1-5 Units, 1-5 Units, Subcutaneous, HS, Denis Jerry MD, 2 Units at 03/03/21 2120    insulin lispro (HumaLOG) 100 units/mL subcutaneous injection 10 Units, 10 Units, Subcutaneous, TID With Meals, Denis Jerry MD, 10 Units at 03/04/21 1714    nicotine (NICODERM CQ) 21 mg/24 hr TD 24 hr patch 21 mg, 21 mg, Transdermal, Daily, Constantine Bautista MD, 21 mg at 03/04/21 1712    oxyCODONE (ROXICODONE) IR tablet 5 mg, 5 mg, Oral, Q4H PRN, Francheska Holt MD, 5 mg at 03/04/21 1523    senna (SENOKOT) tablet 17 2 mg, 2 tablet, Oral, Daily, Constantine Bautista MD, 17 2 mg at 03/03/21 0819    sodium chloride 0 9 % infusion, 125 mL/hr, Intravenous, Continuous, Francheska Holt MD, Last Rate: 125 mL/hr at 03/04/21 0226, 125 mL/hr at 03/04/21 0226    Blood Culture:   Lab Results   Component Value Date    BLOODCX No Growth After 4 Days  02/28/2021       Wound Culture:   No results found for: WOUNDCULT    Ins and Outs:  I/O last 24 hours:   In: 2385 [P O :1380; I V :1000; NG/GT:5]  Out: 04420 [Urine:56498]          Physical Exam:   /79   Pulse 102   Temp 98 3 °F (36 8 °C) (Oral)   Resp 18   Ht 5' 5" (1 651 m)   Wt 52 2 kg (115 lb)   SpO2 91%   BMI 19 14 kg/m²   Gen: Alert and oriented to person, place, time  HEENT: EOMI, eyes clear, moist mucus membranes, hearing intact  Respiratory: Bilateral chest rise  No audible wheezing found  Cardiovascular: Regular Rate and Rhythm  Abdomen: soft nontender/nondistended  Musculoskeletal: right upper extremity  · Skin intact, no fluctuance   · Tender to palpation over medial scapular border with swelling and warmth  · ROM passive and active of shoulder  · Abduction: 90  · Forward flexion: 90  · External rotation 45  · No micromotion tenderness  · Decreased sensation to radial, ulna, median, musculocutaneous, and axillary nerve distributions which is the patient's baseline  · Motor intact to  radial, ulna, median, musculocutaneous, and axillary nerve distributions  · 2+ rad pulse    Radiology:   I personally reviewed the films  X-rays and CT right humerus shows a greater tuberosity cortical irregularity     _*_*_*_*_*_*_*_*_*_*_*_*_*_*_*_*_*_*_*_*_*_*_*_*_*_*_*_*_*_*_*_*_*_*_*_*_*_*_*_*_*    Assessment:  40 y o  male  with right greater tuberosity cortical irregularity  The patient was focally tender over the medial scapular spine which was not included on imaging  Patient would benefit from further imaging study to rule out abscess  Plan:   · WBAT RUE  · IV abx per primary  · Analgesics for pain  · No acute orthopedic intervention for greater tuberosity cortical irregularity  · Body mass index is 19 14 kg/m²     · Dispo: Ortho will follow    Clarice Rueda MD

## 2021-03-04 NOTE — ASSESSMENT & PLAN NOTE
Lab Results   Component Value Date    HGBA1C >14 0 (H) 03/01/2021       Recent Labs     03/03/21  1613 03/03/21  2103 03/04/21  0640 03/04/21  1114   POCGLU 235* 267* 281* 207*       Blood Sugar Average: Last 72 hrs:  (P) 095 [id number]   Poorly-controlled  Noncompliance  Endocrinology following, adjusting basal bolus insulin

## 2021-03-04 NOTE — ASSESSMENT & PLAN NOTE
· Localized to right suprascapular area  · Persistent for multiple days  · No recent history of injury per patient  · Check Ct of scapula to evaluate for injury or abscess, given his sepsis

## 2021-03-04 NOTE — ASSESSMENT & PLAN NOTE
· Patient with underlying poorly-controlled diabetes, poor insulin compliance, presented with bilateral back pain, fatigue, dysuria, and hematuria    · CT scan with Bilateral pyelonephritis with suspected superinfected infected cyst exophytic from the left lower pole measuring 4 9 x 2 5 cm  Small enhancing abscesses in both lobes of the prostate gland with adjacent cystitis, findings consistent with complicated prostatitis with secondary ascending infection  · Status post cystoscopy with drainage of prostatic abscess  · Urine cultures positive for MSSA, will continue cefepime    · CT scan  with persistent renal abscess  · S/p Ir drainage of perirenal abscess  · Monitor drain output  · Await cultures from drain output

## 2021-03-04 NOTE — PROGRESS NOTES
Interventional Radiology Preprocedure Note    History/Indication for procedure:   Sarah Kruger is a 40 y o  male with a PMH of L perirenal abscess who presents for CT guided drainage      Relevant past medical history:    Past Medical History:   Diagnosis Date    Diabetes mellitus ([facility] )     type 2    Hypertension     Psychiatric disorder     depression     Patient Active Problem List   Diagnosis    Pyelonephritis    Prostate abscess    Sepsis ([facility] )    Type 2 diabetes mellitus with hyperglycemia, with long-term current use of insulin ([facility]    Tobacco abuse    Hyponatremia       /94   Pulse 86   Temp 98 6 °F (37 °C)   Resp 18   Ht 5' 5" (1 651 m)   Wt 52 2 kg (115 lb)   SpO2 94%   BMI 19 14 kg/m²     Medications:    Inpatient Medications:     Scheduled Medications:  Current Facility-Administered Medications   Medication Dose Route Frequency Provider Last Rate    acetaminophen  975 mg Oral Q8H [address] Sheila Mccall MD      cefepime  2,000 mg Intravenous Q12H Sheila Mccall MD 2,000 mg (03/04/21 1222)    Diclofenac Sodium  4 g Topical 4x Daily Melvin Mendez MD      gabapentin  300 mg Oral TID Melvin Mendez MD      heparin (porcine)  5,000 Units Subcutaneous [facility] Sheila Mccall MD      HYDROmorphone  1 mg Intravenous Q4H PRN Sheila Mccall MD      insulin glargine  25 Units Subcutaneous HS Thai Donovan MD      insulin lispro  1-5 Units Subcutaneous TID [facility] Thai Donovan MD      insulin lispro  1-5 Units Subcutaneous HS Thai Donovan MD      insulin lispro  10 Units Subcutaneous TID With Meals Thai Donovan MD      nicotine  21 mg Transdermal Daily Melvin Mendez MD      oxyCODONE  5 mg Oral Q4H PRN Sheila Mccall MD      senna  2 tablet Oral Daily Melvin Mendez MD      sodium chloride  125 mL/hr Intravenous Continuous Sheila Mccall  mL/hr (03/04/21 0226)       Infusions:  sodium chloride, 125 mL/hr, Last Rate: 125 mL/hr (03/04/21 4249)        PRN:  HYDROmorphone    oxyCODONE    Outpatient Medications:  No current facility-administered medications on file prior to encounter  Current Outpatient Medications on File Prior to Encounter   Medication Sig Dispense Refill    BASAGLAR KWIKPEN 100 units/mL injection pen       citalopram (CeleXA) 20 mg tablet       gabapentin (NEURONTIN) 100 mg capsule Take 100 mg by mouth 3 (three) times a day      gabapentin (NEURONTIN) 600 MG tablet       glimepiride (AMARYL) 2 mg tablet       metFORMIN (GLUCOPHAGE) 1000 MG tablet Take 1,000 mg by mouth 2 (two) times a day with meals      ACCU-CHEK GUIDE test strip       atorvastatin (LIPITOR) 20 mg tablet       B-D UF III MINI PEN NEEDLES 31G X 5 MM MISC       B-D ULTRAFINE III SHORT PEN 31G X 8 MM MISC       Blood Glucose Monitoring Suppl (ACCU-CHEK GUIDE ME) w/Device KIT       LEVEMIR FLEXTOUCH 100 units/mL injection pen          Allergies   Allergen Reactions    Penicillins        Anticoagulants: none    ASA classification: ASA 3 - Patient with moderate systemic disease with functional limitations    Airway Assessment: II (hard and soft palate, upper portion of tonsils anduvula visible)    Relevant family history: None    Relevant review of systems: None    Prior sedation/anesthesia: yes    Can the patient lie flat?  Yes     NPO Status: yes    Labs:   CBC with diff:   Lab Results   Component Value Date    WBC 19 00 (H) 03/04/2021    HGB 11 8 (L) 03/04/2021    HCT 35 6 (L) 03/04/2021    MCV 86 03/04/2021     (H) 03/04/2021    MCH 28 4 03/04/2021    MCHC 33 1 03/04/2021    RDW 11 6 03/04/2021    MPV 8 4 (L) 03/04/2021    NRBC 0 03/02/2021     BMP/CMP:  Lab Results   Component Value Date    K 4 6 03/04/2021     03/04/2021    CO2 29 03/04/2021    BUN 6 03/04/2021    CREATININE 0 44 (L) 03/04/2021    CALCIUM 8 9 03/04/2021    AST 7 03/01/2021    ALT <6 (L) 03/01/2021    ALKPHOS 157 (H) 03/01/2021    EGFR 138 03/04/2021   ,     Coags: Lab Results   Component Value Date    PTT 32 02/28/2021    INR 0 96 03/04/2021   ,   Results from last 7 days   Lab Units 03/04/21  0537 02/28/21  1234   PTT seconds  --  32   INR  0 96 1 02        Relevant imaging studies:   Reviewed  Directed physical examination:  I agree with the physical exam performed on 3/3/21 and there are no additional changes  Assessment/Plan: For CT guided L perirenal abscess drainage  Sedation/Anesthesia plan: Moderate sedation will be used as needed for procedure  Consent with alternatives to the procedure, risks and benefits have been explained and discussed with the patient/patient's family: yes

## 2021-03-05 ENCOUNTER — APPOINTMENT (INPATIENT)
Dept: RADIOLOGY | Facility: HOSPITAL | Age: 46
DRG: 710 | End: 2021-03-05
Payer: COMMERCIAL

## 2021-03-05 PROBLEM — E87.1 HYPONATREMIA: Status: RESOLVED | Noted: 2021-02-28 | Resolved: 2021-03-05

## 2021-03-05 LAB
ANION GAP SERPL CALCULATED.3IONS-SCNC: 6 MMOL/L (ref 4–13)
BACTERIA BLD CULT: NORMAL
BACTERIA BLD CULT: NORMAL
BUN SERPL-MCNC: 6 MG/DL (ref 5–25)
CALCIUM SERPL-MCNC: 8.9 MG/DL (ref 8.3–10.1)
CHLORIDE SERPL-SCNC: 100 MMOL/L (ref 100–108)
CO2 SERPL-SCNC: 31 MMOL/L (ref 21–32)
CREAT SERPL-MCNC: 0.5 MG/DL (ref 0.6–1.3)
ERYTHROCYTE [DISTWIDTH] IN BLOOD BY AUTOMATED COUNT: 11.9 % (ref 11.6–15.1)
GFR SERPL CREATININE-BSD FRML MDRD: 131 ML/MIN/1.73SQ M
GLUCOSE SERPL-MCNC: 198 MG/DL (ref 65–140)
GLUCOSE SERPL-MCNC: 208 MG/DL (ref 65–140)
GLUCOSE SERPL-MCNC: 218 MG/DL (ref 65–140)
GLUCOSE SERPL-MCNC: 240 MG/DL (ref 65–140)
GLUCOSE SERPL-MCNC: 302 MG/DL (ref 65–140)
HCT VFR BLD AUTO: 33.8 % (ref 36.5–49.3)
HGB BLD-MCNC: 10.9 G/DL (ref 12–17)
MCH RBC QN AUTO: 28.1 PG (ref 26.8–34.3)
MCHC RBC AUTO-ENTMCNC: 32.2 G/DL (ref 31.4–37.4)
MCV RBC AUTO: 87 FL (ref 82–98)
PLATELET # BLD AUTO: 495 THOUSANDS/UL (ref 149–390)
PMV BLD AUTO: 8.6 FL (ref 8.9–12.7)
POTASSIUM SERPL-SCNC: 3.4 MMOL/L (ref 3.5–5.3)
RBC # BLD AUTO: 3.88 MILLION/UL (ref 3.88–5.62)
SODIUM SERPL-SCNC: 137 MMOL/L (ref 136–145)
WBC # BLD AUTO: 18.01 THOUSAND/UL (ref 4.31–10.16)

## 2021-03-05 PROCEDURE — 87186 SC STD MICRODIL/AGAR DIL: CPT | Performed by: INTERNAL MEDICINE

## 2021-03-05 PROCEDURE — 99233 SBSQ HOSP IP/OBS HIGH 50: CPT | Performed by: INTERNAL MEDICINE

## 2021-03-05 PROCEDURE — 10030 IMG GID FLU COLL DRG SFT TIS: CPT

## 2021-03-05 PROCEDURE — 82948 REAGENT STRIP/BLOOD GLUCOSE: CPT

## 2021-03-05 PROCEDURE — 87075 CULTR BACTERIA EXCEPT BLOOD: CPT | Performed by: INTERNAL MEDICINE

## 2021-03-05 PROCEDURE — 87070 CULTURE OTHR SPECIMN AEROBIC: CPT | Performed by: INTERNAL MEDICINE

## 2021-03-05 PROCEDURE — 87205 SMEAR GRAM STAIN: CPT | Performed by: INTERNAL MEDICINE

## 2021-03-05 PROCEDURE — 76942 ECHO GUIDE FOR BIOPSY: CPT

## 2021-03-05 PROCEDURE — 99254 IP/OBS CNSLTJ NEW/EST MOD 60: CPT | Performed by: ORTHOPAEDIC SURGERY

## 2021-03-05 PROCEDURE — 10160 PNXR ASPIR ABSC HMTMA BULLA: CPT | Performed by: STUDENT IN AN ORGANIZED HEALTH CARE EDUCATION/TRAINING PROGRAM

## 2021-03-05 PROCEDURE — 80048 BASIC METABOLIC PNL TOTAL CA: CPT | Performed by: INTERNAL MEDICINE

## 2021-03-05 PROCEDURE — 76942 ECHO GUIDE FOR BIOPSY: CPT | Performed by: STUDENT IN AN ORGANIZED HEALTH CARE EDUCATION/TRAINING PROGRAM

## 2021-03-05 PROCEDURE — 99255 IP/OBS CONSLTJ NEW/EST HI 80: CPT | Performed by: INTERNAL MEDICINE

## 2021-03-05 PROCEDURE — NC001 PR NO CHARGE: Performed by: NURSE PRACTITIONER

## 2021-03-05 PROCEDURE — 76882 US LMTD JT/FCL EVL NVASC XTR: CPT

## 2021-03-05 PROCEDURE — 0P953ZX DRAINAGE OF RIGHT SCAPULA, PERCUTANEOUS APPROACH, DIAGNOSTIC: ICD-10-PCS | Performed by: STUDENT IN AN ORGANIZED HEALTH CARE EDUCATION/TRAINING PROGRAM

## 2021-03-05 PROCEDURE — 99232 SBSQ HOSP IP/OBS MODERATE 35: CPT | Performed by: INTERNAL MEDICINE

## 2021-03-05 PROCEDURE — 85027 COMPLETE CBC AUTOMATED: CPT | Performed by: INTERNAL MEDICINE

## 2021-03-05 PROCEDURE — NC001 PR NO CHARGE: Performed by: STUDENT IN AN ORGANIZED HEALTH CARE EDUCATION/TRAINING PROGRAM

## 2021-03-05 PROCEDURE — 99024 POSTOP FOLLOW-UP VISIT: CPT | Performed by: UROLOGY

## 2021-03-05 PROCEDURE — NC001 PR NO CHARGE: Performed by: ORTHOPAEDIC SURGERY

## 2021-03-05 RX ORDER — INSULIN GLARGINE 100 [IU]/ML
30 INJECTION, SOLUTION SUBCUTANEOUS
Status: DISCONTINUED | OUTPATIENT
Start: 2021-03-05 | End: 2021-03-06

## 2021-03-05 RX ORDER — LIDOCAINE WITH 8.4% SOD BICARB 0.9%(10ML)
SYRINGE (ML) INJECTION CODE/TRAUMA/SEDATION MEDICATION
Status: COMPLETED | OUTPATIENT
Start: 2021-03-05 | End: 2021-03-05

## 2021-03-05 RX ORDER — POTASSIUM CHLORIDE 20 MEQ/1
20 TABLET, EXTENDED RELEASE ORAL 2 TIMES DAILY
Status: COMPLETED | OUTPATIENT
Start: 2021-03-05 | End: 2021-03-06

## 2021-03-05 RX ORDER — POLYETHYLENE GLYCOL 3350 17 G/17G
17 POWDER, FOR SOLUTION ORAL
Status: DISCONTINUED | OUTPATIENT
Start: 2021-03-05 | End: 2021-03-09 | Stop reason: HOSPADM

## 2021-03-05 RX ORDER — FENTANYL CITRATE 50 UG/ML
INJECTION, SOLUTION INTRAMUSCULAR; INTRAVENOUS CODE/TRAUMA/SEDATION MEDICATION
Status: COMPLETED | OUTPATIENT
Start: 2021-03-05 | End: 2021-03-05

## 2021-03-05 RX ORDER — CEFAZOLIN SODIUM 2 G/50ML
2000 SOLUTION INTRAVENOUS EVERY 8 HOURS
Status: DISCONTINUED | OUTPATIENT
Start: 2021-03-05 | End: 2021-03-09 | Stop reason: HOSPADM

## 2021-03-05 RX ORDER — OXYCODONE HYDROCHLORIDE 5 MG/1
5 TABLET ORAL EVERY 4 HOURS PRN
Status: DISCONTINUED | OUTPATIENT
Start: 2021-03-05 | End: 2021-03-09 | Stop reason: HOSPADM

## 2021-03-05 RX ADMIN — GABAPENTIN 300 MG: 300 CAPSULE ORAL at 08:55

## 2021-03-05 RX ADMIN — ACETAMINOPHEN 975 MG: 325 TABLET ORAL at 21:20

## 2021-03-05 RX ADMIN — POTASSIUM CHLORIDE 20 MEQ: 1500 TABLET, EXTENDED RELEASE ORAL at 17:34

## 2021-03-05 RX ADMIN — POTASSIUM CHLORIDE 20 MEQ: 1500 TABLET, EXTENDED RELEASE ORAL at 08:55

## 2021-03-05 RX ADMIN — Medication 10 ML: at 16:56

## 2021-03-05 RX ADMIN — GABAPENTIN 300 MG: 300 CAPSULE ORAL at 17:32

## 2021-03-05 RX ADMIN — OXYCODONE HYDROCHLORIDE 10 MG: 10 TABLET ORAL at 19:45

## 2021-03-05 RX ADMIN — INSULIN GLARGINE 30 UNITS: 100 INJECTION, SOLUTION SUBCUTANEOUS at 21:21

## 2021-03-05 RX ADMIN — ACETAMINOPHEN 975 MG: 325 TABLET ORAL at 05:21

## 2021-03-05 RX ADMIN — CEFAZOLIN SODIUM 2000 MG: 2 SOLUTION INTRAVENOUS at 12:37

## 2021-03-05 RX ADMIN — INSULIN LISPRO 3 UNITS: 100 INJECTION, SOLUTION INTRAVENOUS; SUBCUTANEOUS at 17:33

## 2021-03-05 RX ADMIN — HEPARIN SODIUM 5000 UNITS: 5000 INJECTION INTRAVENOUS; SUBCUTANEOUS at 21:21

## 2021-03-05 RX ADMIN — INSULIN LISPRO 2 UNITS: 100 INJECTION, SOLUTION INTRAVENOUS; SUBCUTANEOUS at 21:22

## 2021-03-05 RX ADMIN — POLYETHYLENE GLYCOL 3350 17 G: 17 POWDER, FOR SOLUTION ORAL at 17:34

## 2021-03-05 RX ADMIN — INSULIN LISPRO 1 UNITS: 100 INJECTION, SOLUTION INTRAVENOUS; SUBCUTANEOUS at 08:53

## 2021-03-05 RX ADMIN — INSULIN LISPRO 2 UNITS: 100 INJECTION, SOLUTION INTRAVENOUS; SUBCUTANEOUS at 12:36

## 2021-03-05 RX ADMIN — HEPARIN SODIUM 5000 UNITS: 5000 INJECTION INTRAVENOUS; SUBCUTANEOUS at 05:21

## 2021-03-05 RX ADMIN — FENTANYL CITRATE 50 MCG: 50 INJECTION INTRAMUSCULAR; INTRAVENOUS at 16:53

## 2021-03-05 RX ADMIN — GABAPENTIN 300 MG: 300 CAPSULE ORAL at 21:21

## 2021-03-05 RX ADMIN — NICOTINE 21 MG: 21 PATCH, EXTENDED RELEASE TRANSDERMAL at 19:48

## 2021-03-05 RX ADMIN — HEPARIN SODIUM 5000 UNITS: 5000 INJECTION INTRAVENOUS; SUBCUTANEOUS at 13:34

## 2021-03-05 RX ADMIN — CEFAZOLIN SODIUM 2000 MG: 2 SOLUTION INTRAVENOUS at 19:46

## 2021-03-05 RX ADMIN — ACETAMINOPHEN 975 MG: 325 TABLET ORAL at 13:35

## 2021-03-05 RX ADMIN — STANDARDIZED SENNA CONCENTRATE 17.2 MG: 8.6 TABLET ORAL at 08:55

## 2021-03-05 NOTE — PROGRESS NOTES
Progress Note - Rodolfo Rouse 1975, 40 y o  male MRN: [id number]    Unit/Bed#: [facility] 522-01 Encounter: [id number]    Primary Care Provider: Manuel Amaya DO   Date and time admitted to hospital: 2/28/2021 11:44 AM        * Pyelonephritis  Assessment & Plan  · Patient with underlying poorly-controlled diabetes, poor insulin compliance, presented with bilateral back pain, fatigue, dysuria, and hematuria  · CT scan with Bilateral pyelonephritis with perinephric abscess and prostatic abscesses  · Status post cystoscopy with drainage of prostatic abscess  · S/p IR drainage of renal abscess  · Urine cultures positive for MSSA, surgical culture from renal abscess is growing GPCs in clusters  · Monitor drain output, may need reimaging or repositioning  · Await cultures from drain output    Acute pain of right shoulder  Assessment & Plan  · Localized to right suprascapular area, found to have an age indetermine fracture of greater tuberosity and 5cm fluid collection of scapular area  · Reports having a fall in December while walking his dog injuring his shoulder and sustaining a laceration of the right hand from the leash     · Continue cefazolin  · Weight bearing as tolerated per ortho    Prostate abscess  Assessment & Plan  Management as above    Sepsis ([address] )  Assessment & Plan  · POA, secondary MSSA abscesses found in prostate, kidney and likely right scapular area  · Patient presented with fever, tachycardia, leukocytosis  · Cultures positive for MSSA, change to IV cefazolin  · Given the usual presentation of MSSA isolated from the urinary tract will consult with infectious disease for opinoin  · Check HIV status, denies any IVDU, blood transfusion, denies any sexual contacts for years     Hyponatremia-resolved as of 3/5/2021  Assessment & Plan  · Secondary to above and decreasing oral intake  · resolved    Tobacco abuse  Assessment & Plan  · Agreeable to nicotine patch at this time    Type 2 diabetes mellitus with hyperglycemia, with long-term current use of insulin [facility])  Assessment & Plan  Lab Results   Component Value Date    HGBA1C >14 0 (H) 2021       Recent Labs     21  2103 21  0640 21  1114 21  1616   POCGLU 267* 281* 207* 186*       Blood Sugar Average: Last 72 hrs:  (P) 274 [id number]   · Poorly-controlled  · Cpeptide is low, likely pancreatic failure  · Noncompliance due to depression  · Endocrinology following, adjusting basal bolus insulin        VTE Pharmacologic Prophylaxis:   Pharmacologic: Enoxaparin (Lovenox)  Mechanical VTE Prophylaxis in Place: Yes    Patient Centered Rounds: I have performed bedside rounds with nursing staff today  Discussions with Specialists or Other Care Team Provider: IR    Education and Discussions with Family / Patient: patient, plan of care    Time Spent for Care: 30 minutes  More than 50% of total time spent on counseling and coordination of care as described above  Current Length of Stay: 5 day(s)    Current Patient Status: Inpatient   Certification Statement: The patient will continue to require additional inpatient hospital stay due to drainage of scapular abscess    Discharge Plan: home when stable    Code Status: Level 1 - Full Code      Subjective:   Reports right shoulder pain  Objective:     Vitals:   Temp (24hrs), Av 5 °F (36 9 °C), Min:98 2 °F (36 8 °C), Max:99 6 °F (37 6 °C)    Temp:  [98 2 °F (36 8 °C)-99 6 °F (37 6 °C)] 98 2 °F (36 8 °C)  HR:  [] 96  Resp:  [18-20] 18  BP: (110-145)/(71-96) 135/84  SpO2:  [89 %-98 %] 93 %  Body mass index is 19 14 kg/m²  Input and Output Summary (last 24 hours): Intake/Output Summary (Last 24 hours) at 3/5/2021 1442  Last data filed at 3/5/2021 1000  Gross per 24 hour   Intake 4966 67 ml   Output 3375 ml   Net 1591 67 ml       Physical Exam:     Physical Exam  Constitutional:       Appearance: Normal appearance  HENT:      Head: Normocephalic and atraumatic  Nose: Nose normal       Mouth/Throat:      Mouth: Mucous membranes are moist       Pharynx: Oropharynx is clear  Eyes:      Extraocular Movements: Extraocular movements intact  Neck:      Musculoskeletal: Neck supple  Cardiovascular:      Rate and Rhythm: Normal rate and regular rhythm  Pulmonary:      Effort: Pulmonary effort is normal       Breath sounds: No wheezing or rales  Abdominal:      Comments: Minimal serosanguinous drainage from flank drain   Musculoskeletal:         General: Tenderness present  Right lower leg: No edema  Left lower leg: No edema  Skin:     Findings: No erythema  Neurological:      General: No focal deficit present  Mental Status: He is alert and oriented to person, place, and time  Additional Data:     Labs:    Results from last 7 days   Lab Units 03/05/21  0454  03/02/21  0308  02/28/21  1234   WBC Thousand/uL 18 01*   < > 22 81*   < > 27 00*   HEMOGLOBIN g/dL 10 9*   < > 11 1*   < > 14 5   HEMATOCRIT % 33 8*   < > 33 3*   < > 41 7   PLATELETS Thousands/uL 495*   < > 433*   < > 562*   BANDS PCT %  --   --   --   --  3   NEUTROS PCT %  --   --  80*   < >  --    LYMPHS PCT %  --   --  8*   < >  --    LYMPHO PCT %  --   --   --   --  2*   MONOS PCT %  --   --  10   < >  --    MONO PCT %  --   --   --   --  8   EOS PCT %  --   --  0   < > 2    < > = values in this interval not displayed  Results from last 7 days   Lab Units 03/05/21 0454 03/01/21  0445   SODIUM mmol/L 137   < > 137   POTASSIUM mmol/L 3 4*   < > 3 8   CHLORIDE mmol/L 100   < > 103   CO2 mmol/L 31   < > 29   BUN mg/dL 6   < > 8   CREATININE mg/dL 0 50*   < > 0 42*   ANION GAP mmol/L 6   < > 5   CALCIUM mg/dL 8 9   < > 9 1   ALBUMIN g/dL  --   --  2 0*   TOTAL BILIRUBIN mg/dL  --   --  0 16*   ALK PHOS U/L  --   --  157*   ALT U/L  --   --  <6*   AST U/L  --   --  7   GLUCOSE RANDOM mg/dL 198*   < > 124    < > = values in this interval not displayed       Results from last 7 days Lab Units 03/04/21  0537   INR  0 96     Results from last 7 days   Lab Units 03/04/21  1616 03/04/21  1114 03/04/21  0640 03/03/21  2103 03/03/21  1613 03/03/21  1113 03/03/21  0622 03/02/21  2104 03/02/21  1517 03/02/21  1132 03/02/21  0632 03/01/21  2217   POC GLUCOSE mg/dl 186* 207* 281* 267* 235* 300* 270* 331* 315* 334* 289* 162*     Results from last 7 days   Lab Units 03/01/21  0445   HEMOGLOBIN A1C % >14 0*     Results from last 7 days   Lab Units 03/01/21  0024 02/28/21  1234   LACTIC ACID mmol/L  --  1 5   PROCALCITONIN ng/ml 0 50* 0 35*           * I Have Reviewed All Lab Data Listed Above  * Additional Pertinent Lab Tests Reviewed: All Labs Within Last 24 Hours Reviewed    Imaging:    Imaging Reports Reviewed Today Include: MSK US    Recent Cultures (last 7 days):     Results from last 7 days   Lab Units 03/04/21  1259 03/01/21  1100 02/28/21  1244 02/28/21  1235 02/28/21  1234   BLOOD CULTURE   --   --   --  No Growth After 4 Days  No Growth After 4 Days     GRAM STAIN RESULT  4+ Polys*  3+ Gram positive cocci in clusters*  --   --   --   --    URINE CULTURE   --  70,000-79,000 cfu/ml Staphylococcus aureus* >100,000 cfu/ml Staphylococcus aureus*  --   --    BODY FLUID CULTURE, STERILE  4+ Growth of Staphylococcus aureus*  --   --   --   --        Last 24 Hours Medication List:   Current Facility-Administered Medications   Medication Dose Route Frequency Provider Last Rate    acetaminophen  975 mg Oral Q8H [address] Lopez Pearce MD      cefazolin  2,000 mg Intravenous Q8H Carmen Valdez MD 2,000 mg (03/05/21 1237)    gabapentin  300 mg Oral TID Carmen Valdez MD      heparin (porcine)  5,000 Units Subcutaneous [facility] Lopez Pearce MD      HYDROmorphone  1 mg Intravenous Q4H PRN Carmen Valdez MD      insulin glargine  28 Units Subcutaneous HS Billie Kay MD      insulin lispro  1-5 Units Subcutaneous TID [facility] Billie Kay [facility]      insulin lispro  1-5 Units Subcutaneous HS Billie Kay, MD      insulin lispro  10 Units Subcutaneous TID With Meals Danae Chun MD      nicotine  21 mg Transdermal Daily Rachelle Curran MD      oxyCODONE  10 mg Oral Q4H PRN Rachelle Curran MD      potassium chloride  20 mEq Oral BID Rachelle Curran MD      senna  2 tablet Oral Daily Rachelle Curran MD          Today, Patient Was Seen By: Kajal Fowler MD    ** Please Note: Dictation voice to text software may have been used in the creation of this document   **

## 2021-03-05 NOTE — PROGRESS NOTES
Progress Note - Matthieu Bledsoe 40 y o  male MRN: [id number]    Unit/Bed#: [facility] 522-01 Encounter: [id number]      CC: diabetes f/u    Subjective:   Matthieu Bledsoe is a 40y o  year old male with type 2 diabetes, uncontrolled who is admitted for bilateral pyelonephritis with perinephric abscess status post IR drainage of renal abscess and prostate abscess status post   TURP,   Patient is currently on Lantus 20 units q h s  And Humalog 10 units t i d  A c  And correctional sliding scale however patient's blood sugars are above target goal for admitted patient,    Objective:     Vitals: Blood pressure 135/85, pulse 96, temperature 98 8 °F (37 1 °C), resp  rate 18, height 5' 5" (1 651 m), weight 52 2 kg (115 lb), SpO2 93 %  ,Body mass index is 19 14 kg/m²  Intake/Output Summary (Last 24 hours) at 3/5/2021 1532  Last data filed at 3/5/2021 1000  Gross per 24 hour   Intake 4966 67 ml   Output 3375 ml   Net 1591 67 ml       Physical Exam:  General Appearance: awake, appears stated age and cooperative  Head: Normocephalic, without obvious abnormality, atraumatic  Extremities: moves all extremities  Skin: Skin color and temperature normal    Pulm: no labored breathing    Lab, Imaging and other studies: I have personally reviewed pertinent reports  POC Glucose (mg/dl)   Date Value   03/04/2021 186 (H)   03/04/2021 207 (H)   03/04/2021 281 (H)   03/03/2021 267 (H)   03/03/2021 235 (H)   03/03/2021 300 (H)   03/03/2021 270 (H)   03/02/2021 331 (H)   03/02/2021 315 (H)   03/02/2021 334 (H)       Assessment and plan:    Type 2 diabetes mellitus with hyperglycemia, with long-term current use of insulin [facility])  Assessment & Plan  Lab Results   Component Value Date    HGBA1C >14 0 (H) 03/01/2021       Recent Labs     03/01/21  0412 03/01/21  0615 03/01/21  0819 03/01/21  0959   POCGLU 128 134 112 116     Uncontrolled with most recent A1c of greater than than 14%  The goal is less than 7%     Home regimen:  Basaglar 10 units q h s  And metformin 2000 mg twice daily however he is not compliant with his regimen  Currently on Lantus 28 units subcutaneously at bedtime and Humalog 10 units with meals 3 times a day plus correctional sliding scale insulin   However patient blood sugars are above target goal for admitted patient  Will increase Lantus to 30  and Humalog to 12 and continue correctional sliding scale  Continue to monitor blood sugar over time and make adjustments to the regimen if necessary  Prostate abscess  Assessment & Plan  Management as per primary team    * Pyelonephritis  Assessment & Plan  Management as per primary team              Portions of the record may have been created with voice recognition software

## 2021-03-05 NOTE — PLAN OF CARE
Problem: Potential for Falls  Goal: Patient will remain free of falls  Description: INTERVENTIONS:  - Assess patient frequently for physical needs  -  Identify cognitive and physical deficits and behaviors that affect risk of falls  -  [place] fall precautions as indicated by assessment   - Educate patient/family on patient safety including physical limitations  - Instruct patient to call for assistance with activity based on assessment  - Modify environment to reduce risk of injury  - Consider OT/PT consult to assist with strengthening/mobility  Outcome: Progressing     Problem: Prexisting or High Potential for Compromised Skin Integrity  Goal: Skin integrity is maintained or improved  Description: INTERVENTIONS:  - Identify patients at risk for skin breakdown  - Assess and monitor skin integrity  - Assess and monitor nutrition and hydration status  - Monitor labs   - Assess for incontinence   - Turn and reposition patient  - Assist with mobility/ambulation  - Relieve pressure over bony prominences  - Avoid friction and shearing  - Provide appropriate hygiene as needed including keeping skin clean and dry  - Evaluate need for skin moisturizer/barrier cream  - Collaborate with interdisciplinary team   - Patient/family teaching  - Consider wound care consult   Outcome: Progressing     Problem: Nutrition/Hydration-ADULT  Goal: Nutrient/Hydration intake appropriate for improving, restoring or maintaining nutritional needs  Description: Monitor and assess patient's nutrition/hydration status for malnutrition  Collaborate with interdisciplinary team and initiate plan and interventions as ordered  Monitor patient's weight and dietary intake as ordered or per policy  Utilize nutrition screening tool and intervene as necessary  Determine patient's food preferences and provide high-protein, high-caloric foods as appropriate       INTERVENTIONS:  - Monitor oral intake, urinary output, labs, and treatment plans  - Assess nutrition and hydration status and recommend course of action  - Evaluate amount of meals eaten  - Assist patient with eating if necessary   - Allow adequate time for meals  - Recommend/ encourage appropriate diets, oral nutritional supplements, and vitamin/mineral supplements  - Order, calculate, and assess calorie counts as needed  - Recommend, monitor, and adjust tube feedings and TPN/PPN based on assessed needs  - Assess need for intravenous fluids  - Provide specific nutrition/hydration education as appropriate  - Include patient/family/caregiver in decisions related to nutrition  Outcome: Progressing

## 2021-03-05 NOTE — UTILIZATION REVIEW
Continued Stay Review    Date: 3/5/2021                        Current Patient Class: IP   Current Level of Care: Med/Surg  HPI:46 y o  male initially admitted on 2/28 with pyelonephritis, abscess of the prostate and DKA  MSSA abscesses found in prostate, kidney and likely right scapular area   Assessment/Plan: C/o R shoulder pain  S/p IR drainage of renal abscess 3/4  Surgical culture from renal abscess is growing GPCs in clusters  Monitor drain output, may need reimaging or repositioning  Cultures positive for MSSA, change to IV cefazolin  Given the usual presentation of MSSA isolated from the urinary tract will consult with infectious disease for opinion  Check HIV status  WBAT to RUE per ortho  IR plans for right scapular area aspiration/drain placement    Pertinent Labs/Diagnostic Results:   Results from last 7 days   Lab Units 03/05/21 0454 03/04/21  0537 03/03/21  0434 03/02/21  0308 03/01/21  0445  02/28/21  1234   WBC Thousand/uL 18 01* 19 00* 22 68* 22 81* 23 43*  --  27 00*   HEMOGLOBIN g/dL 10 9* 11 8* 11 1* 11 1* 11 8*  --  14 5   HEMATOCRIT % 33 8* 35 6* 33 4* 33 3* 35 1*  --  41 7   PLATELETS Thousands/uL 495* 496* 450* 433* 483*   < > 562*   NEUTROS ABS Thousands/µL  --   --   --  17 91* 17 51*  --   --    BANDS PCT %  --   --   --   --   --   --  3    < > = values in this interval not displayed       Results from last 7 days   Lab Units 03/05/21 0454 03/04/21  0537 03/03/21  0434 03/02/21  0308 03/01/21  0819 03/01/21  0445   SODIUM mmol/L 137 136 132* 133* 139 137   POTASSIUM mmol/L 3 4* 4 6 3 8 3 8 3 5 3 8   CHLORIDE mmol/L 100 102 100 100 105 103   CO2 mmol/L 31 29 27 27 31 29   ANION GAP mmol/L 6 5 5 6 3* 5   BUN mg/dL 6 6 6 7 7 8   CREATININE mg/dL 0 50* 0 44* 0 42* 0 42* 0 41* 0 42*   EGFR ml/min/1 73sq m 131 138 141 141 142 141   CALCIUM mg/dL 8 9 8 9 8 5 8 4 8 7 9 1   MAGNESIUM mg/dL  --   --   --   --   --  1 9   PHOSPHORUS mg/dL  --   --   --   --   --  1 9*     Results from last 7 days Lab Units 03/01/21  0445 02/28/21  1234   AST U/L 7 5   ALT U/L <6* 9*   ALK PHOS U/L 157* 148*   TOTAL PROTEIN g/dL 6 2* 7 6   ALBUMIN g/dL 2 0* 2 7*   TOTAL BILIRUBIN mg/dL 0 16* 0 68     Results from last 7 days   Lab Units 03/04/21  1616 03/04/21  1114 03/04/21  0640 03/03/21  2103 03/03/21  1613 03/03/21  1113 03/03/21  0622 03/02/21  2104 03/02/21  1517 03/02/21  1132 03/02/21  0632 03/01/21  2217   POC GLUCOSE mg/dl 186* 207* 281* 267* 235* 300* 270* 331* 315* 334* 289* 162*     Results from last 7 days   Lab Units 03/05/21  0454 03/04/21  0537 03/03/21  0434 03/02/21  0308 03/01/21  0819 03/01/21  0445 03/01/21  0018 02/28/21  1607 02/28/21  1234   GLUCOSE RANDOM mg/dL 198* 243* 270* 260* 128 124 228* 319* 547*         Results from last 7 days   Lab Units 03/01/21  0445   HEMOGLOBIN A1C % >14 0*   EAG mg/dl >355     Results from last 7 days   Lab Units 03/04/21  0537 02/28/21  1234   PROTIME seconds 12 8 13 4   INR  0 96 1 02   PTT seconds  --  32       Results from last 7 days   Lab Units 03/01/21  0024 02/28/21  1234   PROCALCITONIN ng/ml 0 50* 0 35*     Results from last 7 days   Lab Units 02/28/21  1244   CLARITY UA  Clear   COLOR UA  Yellow   SPEC GRAV UA  <=1 005   PH UA  6 0   GLUCOSE UA mg/dl >=1000 (1%)*   KETONES UA mg/dl 40 (2+)*   BLOOD UA  Small*   PROTEIN UA mg/dl Negative   NITRITE UA  Positive*   BILIRUBIN UA  Negative   UROBILINOGEN UA E U /dl 0 2   LEUKOCYTES UA  Trace*   WBC UA /hpf 30-50*   RBC UA /hpf None Seen   BACTERIA UA /hpf Moderate*   EPITHELIAL CELLS WET PREP /hpf None Seen       Results from last 7 days   Lab Units 03/04/21  1259 03/01/21  1100 02/28/21  1244 02/28/21  1235 02/28/21  1234   BLOOD CULTURE   --   --   --  No Growth After 4 Days  No Growth After 4 Days     GRAM STAIN RESULT  4+ Polys*  3+ Gram positive cocci in clusters*  --   --   --   --    URINE CULTURE   --  70,000-79,000 cfu/ml Staphylococcus aureus* >100,000 cfu/ml Staphylococcus aureus*  --   --    BODY FLUID CULTURE, STERILE  4+ Growth of Staphylococcus aureus*  --   --   --   --        Vital Signs:   Date/Time  Temp  Pulse  Resp  BP  MAP (mmHg)  SpO2  O2 Device   03/05/21 08:09:13  98 2 °F (36 8 °C)  96  18  135/84  101  93 %  --   03/04/21 17:07:50  --  106Abnormal   --  --  --  92 %  --   03/04/21 16:01:43  --  102  --  120/76  91  89 %Abnormal   --   03/04/21 1516  98 3 °F (36 8 °C)  106Abnormal   18  117/71  86  94 %  None (Room air)   03/04/21 15:15:26  98 3 °F (36 8 °C)  110Abnormal   --  117/71  86  95 %  --   03/04/21 15:14:09  98 3 °F (36 8 °C)  112Abnormal   20  110/77  88  95 %  --   03/04/21 15:04:37  --  109Abnormal   --  110/77  88  95 %  --   03/04/21 14:04:44  --  112Abnormal   20  164/110Abnormal   128  95 %  None (Room air)   03/04/21 1306  --  90  16  115/64  84  95 %  None (Room air)   03/04/21 07:21:33  98 6 °F (37 °C)  86  18  153/94  --  94 %  --   03/03/21 2300  98 7 °F (37 1 °C)  94  18  142/72  --  95 %  None (Room air)   03/03/21 1500  98 6 °F (37 °C)  92  --  134/82  --  96 %  --   03/03/21 0628  98 5 °F (36 9 °C)  91  19  136/88  --  96 %  None (Room air)         Medications:   Scheduled Medications:  acetaminophen, 975 mg, Oral, Q8H NAYELI  cefazolin, 2,000 mg, Intravenous, Q8H  gabapentin, 300 mg, Oral, TID  heparin (porcine), 5,000 Units, Subcutaneous, Q8H NAYELI  insulin glargine, 28 Units, Subcutaneous, HS  insulin lispro, 1-5 Units, Subcutaneous, TID AC  insulin lispro, 1-5 Units, Subcutaneous, HS  insulin lispro, 10 Units, Subcutaneous, TID With Meals  nicotine, 21 mg, Transdermal, Daily  potassium chloride, 20 mEq, Oral, BID  senna, 2 tablet, Oral, Daily       PRN Meds:  HYDROmorphone, 1 mg, Intravenous, Q4H PRN 3/3 x2, 3/4 x1  OxyCODONE, 5 mg oral Q4H PRN 3/4 x2 then d/c'd  oxyCODONE, 10 mg, Oral, Q4H PRN 3/4 x1        Discharge Plan: D    Network Utilization Review Department  ATTENTION: Please call with any questions or concerns to [phone] and carefully listen to the prompts so that you are directed to the right person  All voicemails are confidential   Odette Lopez all requests for admission clinical reviews, approved or denied determinations and any other requests to dedicated fax number below belonging to the campus where the patient is receiving treatment   List of dedicated fax numbers for the Facilities:  [address] DENIALS (Administrative/Medical Necessity) [phone]   [address] (Maternity/NICU/Pediatrics) [phone] [address] Yuko Lord 1239 (  Guido Pettit "Deisi" 103) [address] [place] [address] [address] [place] [address] [phone]

## 2021-03-05 NOTE — PROGRESS NOTES
Interventional Radiology Preprocedure Note    History/Indication for procedure:   Steffi Ragsdale is a 40 y o  male with a PMH of R scapular collection who presents for US guided aspiration and/or drainage      Relevant past medical history:    Past Medical History:   Diagnosis Date    Diabetes mellitus ([facility] )     type 2    Hypertension     Psychiatric disorder     depression     Patient Active Problem List   Diagnosis    Pyelonephritis    Prostate abscess    Sepsis ([facility] )    Type 2 diabetes mellitus with hyperglycemia, with long-term current use of insulin ([facility])    Tobacco abuse    Acute pain of right shoulder       /85   Pulse 96   Temp 98 8 °F (37 1 °C)   Resp 18   Ht 5' 5" (1 651 m)   Wt 52 2 kg (115 lb)   SpO2 93%   BMI 19 14 kg/m²     Medications:    Inpatient Medications:     Scheduled Medications:  Current Facility-Administered Medications   Medication Dose Route Frequency Provider Last Rate    acetaminophen  975 mg Oral Q8H [address] Marquis Jerry MD      cefazolin  2,000 mg Intravenous Q8H Genoveva Lowery MD 2,000 mg (03/05/21 1237)    gabapentin  300 mg Oral TID Genoveva Lowery MD      heparin (porcine)  5,000 Units Subcutaneous [facility] Marquis Jerry MD      HYDROmorphone  1 mg Intravenous Q4H PRN Genoveva Lowery MD      insulin glargine  30 Units Subcutaneous HS Leticia Merchant MD      insulin lispro  1-5 Units Subcutaneous TID [facility] [place] Leticia Merchant MD      insulin lispro  1-5 Units Subcutaneous HS Leticia Merchant MD      insulin lispro  12 Units Subcutaneous TID With Meals Leticia Merchant MD      nicotine  21 mg Transdermal Daily Genoveva Lowery MD      oxyCODONE  10 mg Oral Q4H PRN Genoveva Lowery MD      oxyCODONE  5 mg Oral Q4H PRN Genoveva Lowery MD      polyethylene glycol  17 g Oral BID AC Genoveva Lowery MD      potassium chloride  20 mEq Oral BID Genoveva Lowery MD         Infusions:       PRN:  HYDROmorphone    oxyCODONE    oxyCODONE    Outpatient Medications:  No current facility-administered medications on file prior to encounter  Current Outpatient Medications on File Prior to Encounter   Medication Sig Dispense Refill    BASAGLAR KWIKPEN 100 units/mL injection pen       citalopram (CeleXA) 20 mg tablet       gabapentin (NEURONTIN) 100 mg capsule Take 100 mg by mouth 3 (three) times a day      gabapentin (NEURONTIN) 600 MG tablet       glimepiride (AMARYL) 2 mg tablet       metFORMIN (GLUCOPHAGE) 1000 MG tablet Take 1,000 mg by mouth 2 (two) times a day with meals      ACCU-CHEK GUIDE test strip       atorvastatin (LIPITOR) 20 mg tablet       B-D UF III MINI PEN NEEDLES 31G X 5 MM MISC       B-D ULTRAFINE III SHORT PEN 31G X 8 MM MISC       Blood Glucose Monitoring Suppl (ACCU-CHEK GUIDE ME) w/Device KIT       LEVEMIR FLEXTOUCH 100 units/mL injection pen          Allergies   Allergen Reactions    Penicillins        Anticoagulants: none    ASA classification: ASA 3 - Patient with moderate systemic disease with functional limitations    Airway Assessment: II (hard and soft palate, upper portion of tonsils anduvula visible)    Relevant family history: None    Relevant review of systems: None    Prior sedation/anesthesia: yes    Can the patient lie flat?  Yes     NPO Status: yes    Labs:   CBC with diff:   Lab Results   Component Value Date    WBC 18 01 (H) 03/05/2021    HGB 10 9 (L) 03/05/2021    HCT 33 8 (L) 03/05/2021    MCV 87 03/05/2021     (H) 03/05/2021    MCH 28 1 03/05/2021    MCHC 32 2 03/05/2021    RDW 11 9 03/05/2021    MPV 8 6 (L) 03/05/2021    NRBC 0 03/02/2021     BMP/CMP:  Lab Results   Component Value Date    K 3 4 (L) 03/05/2021     03/05/2021    CO2 31 03/05/2021    BUN 6 03/05/2021    CREATININE 0 50 (L) 03/05/2021    CALCIUM 8 9 03/05/2021    AST 7 03/01/2021    ALT <6 (L) 03/01/2021    ALKPHOS 157 (H) 03/01/2021    EGFR 131 03/05/2021   ,     Coags:   Lab Results   Component Value Date    PTT 32 02/28/2021    INR 0 96 03/04/2021   , Results from last 7 days   Lab Units 03/04/21  0537 02/28/21  1234   PTT seconds  --  32   INR  0 96 1 02        Relevant imaging studies:   Reviewed  Directed physical examination:  I agree with the physical exam performed on 3/5/21 and there are no additional changes  Assessment/Plan: For US guided R scapular collection aspiration and/or drainage  Sedation/Anesthesia plan: Moderate sedation will be used as needed for procedure  Consent with alternatives to the procedure, risks and benefits have been explained and discussed with the patient/patient's family: yes

## 2021-03-05 NOTE — PROGRESS NOTES
Subjective: No acute events overnight  No acute distress  Pain is about the same    Objective:  A 10 point ROS was performed; negative except as noted above       Lab Results   Component Value Date/Time    WBC 18 01 (H) 03/05/2021 04:54 AM    HGB 10 9 (L) 03/05/2021 04:54 AM       Vitals:    03/04/21 2356   BP: 138/90   Pulse: 87   Resp: 20   Temp: 99 6 °F (37 6 °C)   SpO2: 98%     RUE:  Skin intact  Decreased sensation in fingertips (baseline)  TTP around medial scapular region  Motor intact  Fingers WWP    Assessment: 40 y o  male  With R GT irregularity and medial scapular pain    Plan:  WBAT RUE  Activities as tolerated   PT For ROM and strengthening

## 2021-03-05 NOTE — CONSULTS
Consultation - Infectious Disease   [facility] 40 y o  male MRN: [id number]  Unit/Bed#: [facility] 522-01 Encounter: [id number]      IMPRESSION & RECOMMENDATIONS:   Impression/Recommendations: This is a 40 y o  male, with poorly controlled DM, presented to ER on 02/28 with 2 week history of flank pain, lower abdominal pain and right shoulder pain  Patient was subsequently found to have prostate abscess, left renal abscess and right shoulder abscess  He is status post TURP and drainage of renal abscess  He is scheduled to undergo drainage of right shoulder abscess  Abscess culture with growth of MSSA  1  Sepsis, POA, presented with leukocytosis and tachycardia  Source of sepsis is most likely the multiple MSSA abscesses  Patient is clinically improved  WBC decreasing, although still elevated  HR decreasing, although still elevated  He has remained hemodynamically stable, without hypotension  Admission blood cultures have no growth  Antibiotic plan as in below  Monitor temperature/WBC  Monitor hemodynamics  2  Multifocal MSSA abscesses, involving prostate, left kidney and probable right shoulder  Patient is status post drainage of prostate abscess and left renal abscess, with growth of MSSA  Staph aureus is not a common pathogen in UTI  Together with right shoulder abscess, I am concerned the patient was bacteremic at some point of time in the past, although blood cultures on admission have no growth  He may have endocarditis  What we are seeing now is multifocal septic emboli  For now, will check 2D echo  If there is growth of MSSA in right shoulder abscess also, patient will need HERMANN  If patient does indeed have endocarditis, he will need long-term IV antibiotic  On the other hand, if endocarditis is not present, with appropriate drainage of abscesses, he only needs p o  Antibiotic course  Continue high-dose IV cefazolin  Follow-up on right shoulder abscess culture  Check 2D echo    If right shoulder abscess has growth of MSSA also, will also check HERMANN  At this point, duration of IV antibiotic is unclear but I suspect the patient will need long-term IV antibiotic  3  Recent right hand laceration  This occurred back in December 2020, on a dog leash  Laceration appears to have healed  At present, there is no evidence of cellulitis  Per patient description, this laceration appears to have been small  However, given his very poorly controlled DM, there is risk for bacteremia even with small and insignificant loss of skin integrity  Concern for recent bacteremia with secondary endocarditis, as in above  Workup for possible endocarditis, as in above  4  DM, poorly controlled, with hyperglycemia and elevated hemoglobin A1c  This is clear risk factor for multifocal abscesses above  Patient counseled regarding the need to better control his blood sugar to prevent future complications  Management per primary service  Hospitalization records reviewed in detail  Discussed with patient in detail regarding the above plan  Discussed with Dr Dailey from [facility] service  Thank you for this consultation  We will follow along with you  HISTORY OF PRESENT ILLNESS:  Reason for Consult:  Multifocal MSSA abscesses  HPI: Steffi Ragsdale is a 40 y o  male, with poorly controlled DM, came to the ER on 02/28 with 2 week history of progressing bilateral back/flank pain, lower abdominal pain and right shoulder pain  Patient also had dysuria  On presentation, patient did not have fever but had leukocytosis and tachycardia  Abdomen/pelvis CT with prostate abscess and possible left renal abscess  Patient underwent TURP and drainage of prostate abscess on 03/01  Culture grew MSSA  Patient had been on broad-spectrum vanc initially  This was transition to cefazolin  Patient had drainage of left renal abscess yesterday  Thus far, culture is growing Staph aureus    Evaluation of right shoulder pain showed a deep complex collection, concern for abscess  Patient is scheduled to undergo drainage of this collection also  For all these reasons, we are asked to evaluate the patient  Patient states that back in December 2020, he had a cut in his right hand from his dog's leash  This eventually healed without any antibiotic  He denies any fever or chills then  Over the last 2 weeks, he had bilateral flank pain and lower abdominal pain  He states that he also had right shoulder pain but this was much milder compared to the flank and abdominal pain  For the 2-3 days prior to coming to ER, patient reports chills but did not take his temperature  REVIEW OF SYSTEMS:  A complete system-based review was done  Except for what is noted in HPI above, ROS of systems is otherwise negative  PAST MEDICAL HISTORY:  Past Medical History:   Diagnosis Date    Diabetes mellitus ([facility] [address] )     type 2    Hypertension     Psychiatric disorder     depression     Past Surgical History:   Procedure Laterality Date    CT GUIDED PERC DRAINAGE CATHETER PLACEMENT  3/4/2021    CYSTOSCOPY N/A 3/1/2021    Procedure: CYSTOSCOPY;  Surgeon: Lopez Pearce MD;  Location: [facility];  Service: Urology    TRANSURETHRAL RESECTION OF PROSTATE N/A 3/1/2021    Procedure: TRANSURETHRAL RESECTION OF URETHERAL PROSTATE ABSCESS(TURP); Surgeon: Lopez Pearce MD;  Location: [facility];  Service: Urology     Problem list reviewed      FAMILY HISTORY:  Non-contributory    SOCIAL HISTORY:  Social History     Substance and Sexual Activity   Alcohol Use Not Currently    Comment: occasionally     Social History     Substance and Sexual Activity   Drug Use No     Social History     Tobacco Use   Smoking Status Current Every Day Smoker    Packs/day: 0 50    Types: Cigarettes   Smokeless Tobacco Never Used       ALLERGIES:  Allergies   Allergen Reactions    Penicillins        MEDICATIONS:  All current active medications have been reviewed  Patient is currently on IV cefazolin  PHYSICAL EXAM:  Vitals:  Temp:  [98 2 °F (36 8 °C)-99 6 °F (37 6 °C)] 98 8 °F (37 1 °C)  HR:  [] 96  Resp:  [18-20] 18  BP: (123-138)/(79-90) 135/85  SpO2:  [91 %-98 %] 93 %  Temp (24hrs), Av 9 °F (37 2 °C), Min:98 2 °F (36 8 °C), Max:99 6 °F (37 6 °C)  Current: Temperature: 98 8 °F (37 1 °C)     Physical Exam:  General:  Thin, acute and chronically ill appearing, nontoxic, in no acute distress  Awake, alert and oriented x 3  Eyes:  Conjunctive clear with no hemorrhages or effusions  Oropharynx:  No ulcers, no lesions, pharynx benign, no tonsillitis  Neck:  Supple, no lymphadenopathy, no mass, nontender  Lungs:  Expansion symmetric, no rales, no wheezing, no accessory muscle use  Cardiac:  Tachycardic with regular rhythm, normal S1, normal S2, no murmurs  Abdomen:  Soft, nondistended, mild left flank and lower abdominal tenderness, no HSM  Extremities:  No edema, no erythema, mild tenderness over right scapula  No ulcers  Skin:  No rashes, no ulcers  Neurological:  Moves all four extremities spontaneously, sensation grossly intact    LABS, IMAGING, & OTHER STUDIES:  Lab Results:  I have personally reviewed pertinent labs  Results from last 7 days   Lab Units 21  0454 21  0537 21  0434  21  0445  21  1234   POTASSIUM mmol/L 3 4* 4 6 3 8   < > 3 8   < > 4 2   CHLORIDE mmol/L 100 102 100   < > 103   < > 85*   CO2 mmol/L 31 29 27   < > 29   < > 32   BUN mg/dL 6 6 6   < > 8   < > 19   CREATININE mg/dL 0 50* 0 44* 0 42*   < > 0 42*   < > 0 86   EGFR ml/min/1 73sq m 131 138 141   < > 141   < > 105   CALCIUM mg/dL 8 9 8 9 8 5   < > 9 1   < > 9 6   AST U/L  --   --   --   --  7  --  5   ALT U/L  --   --   --   --  <6*  --  9*   ALK PHOS U/L  --   --   --   --  157*  --  148*    < > = values in this interval not displayed       Results from last 7 days   Lab Units 21  0454 21  0537 21  0434   WBC Thousand/uL 18 01* 19 00* 22 68*   HEMOGLOBIN g/dL 10 9* 11 8* 11 1*   PLATELETS Thousands/uL 495* 496* 450*     Results from last 7 days   Lab Units 03/04/21  1259 03/01/21  1100 02/28/21  1244 02/28/21  1235 02/28/21  1234   BLOOD CULTURE   --   --   --  No Growth After 5 Days  No Growth After 5 Days  GRAM STAIN RESULT  4+ Polys*  3+ Gram positive cocci in clusters*  --   --   --   --    URINE CULTURE   --  70,000-79,000 cfu/ml Staphylococcus aureus* >100,000 cfu/ml Staphylococcus aureus*  --   --    BODY FLUID CULTURE, STERILE  4+ Growth of Staphylococcus aureus*  --   --   --   --        Imaging Studies:   I have personally reviewed pertinent imaging study reports and images in PACS  CXR reviewed personally  No infiltrates or consolidations  Abdomen/pelvis CT reviewed personally  Prostatic abscess  Bilateral pyelonephritis with left renal abscess  Right arm CT reviewed personally  No obvious abscess  Right arm ultrasound reviewed  Complex fluid collection deep to trapezius muscle  EKG, Pathology, and Other Studies:   I have personally reviewed pertinent reports

## 2021-03-05 NOTE — ASSESSMENT & PLAN NOTE
· Localized to right suprascapular area, found to have an age indetermine fracture of greater tuberosity and 5cm fluid collection of scapular area  · Reports having a fall in December while walking his dog injuring his shoulder and sustaining a laceration of the right hand from the leash     · Continue cefazolin  · Weight bearing as tolerated per ortho

## 2021-03-05 NOTE — QUICK NOTE
R medial scapula pain most likely muscular in nature and without signs of infection on exam  R GT irregularity does not cause any discomfort on exam and does not need any acute intervention  No further orthopedic intervention at this time  Plan: WBAT RUE, PT, F/U as needed outpatient, no need for US from ortho perspective

## 2021-03-05 NOTE — ASSESSMENT & PLAN NOTE
· POA, secondary MSSA abscesses found in prostate, kidney and likely right scapular area  · Patient presented with fever, tachycardia, leukocytosis  · Cultures positive for MSSA, change to IV cefazolin  · Given the usual presentation of MSSA isolated from the urinary tract will consult with infectious disease for opinoin  · Check HIV status, denies any IVDU, blood transfusion, denies any sexual contacts for years

## 2021-03-05 NOTE — SEDATION DOCUMENTATION
Right scapula aspiration completed for 2cc purulent drainage  Labs sent  Dressing cdi with bandaid  Pt tolerated procedure well   Report called to TRUPTI Weldon

## 2021-03-05 NOTE — ASSESSMENT & PLAN NOTE
· Patient with underlying poorly-controlled diabetes, poor insulin compliance, presented with bilateral back pain, fatigue, dysuria, and hematuria  · CT scan with Bilateral pyelonephritis with perinephric abscess and prostatic abscesses  · Status post cystoscopy with drainage of prostatic abscess    · S/p IR drainage of renal abscess  · Urine cultures positive for MSSA, surgical culture from renal abscess is growing GPCs in clusters  · Monitor drain output, may need reimaging or repositioning  · Await cultures from drain output

## 2021-03-05 NOTE — PROGRESS NOTES
UROLOGY PROGRESS NOTE   Patient Identifiers: Xin Stanley (MRN [id number])  Date of Service: 3/5/2021    Subjective:    Awake and alert  Sitting on the edge of the bed  Urine clear yellow  Minimal output from percutaneous drain  Creatinine 0 50  WBC 18 01  Patient has  complaints of Some left flank pain         Objective:     VITALS:    Vitals:    03/05/21 0809   BP: 135/84   Pulse: 96   Resp: 18   Temp: 98 2 °F (36 8 °C)   SpO2: 93%           LABS:  Lab Results   Component Value Date    HGB 10 9 (L) 03/05/2021    HCT 33 8 (L) 03/05/2021    WBC 18 01 (H) 03/05/2021     (H) 03/05/2021   ]    Lab Results   Component Value Date    K 3 4 (L) 03/05/2021     03/05/2021    CO2 31 03/05/2021    BUN 6 03/05/2021    CREATININE 0 50 (L) 03/05/2021    CALCIUM 8 9 03/05/2021   ]        INPATIENT MEDS:    Current Facility-Administered Medications:     acetaminophen (TYLENOL) tablet 975 mg, 975 mg, Oral, Q8H [address], Henok De La Cruz MD, 975 mg at 03/05/21 0521    cefepime (MAXIPIME) 2 g/50 mL dextrose IVPB, 2,000 mg, Intravenous, Q12H, Henok De La Cruz MD, Last Rate: 100 mL/hr at 03/04/21 2307, 2,000 mg at 03/04/21 2307    gabapentin (NEURONTIN) capsule 300 mg, 300 mg, Oral, TID, Rachel Mcknight MD, 300 mg at 03/05/21 0855    heparin (porcine) subcutaneous injection 5,000 Units, 5,000 Units, Subcutaneous, Q8H [address], 5,000 Units at 03/05/21 0521 **AND** [COMPLETED] Platelet count, , , Once, Rafael Veras,     HYDROmorphone (DILAUDID) injection 1 mg, 1 mg, Intravenous, Q4H PRN, Rachel Mcknight MD    insulin glargine (LANTUS) subcutaneous injection 28 Units 0 28 mL, 28 Units, Subcutaneous, HS, Silvina Beebe MD, 28 Units at 03/04/21 2133    insulin lispro (HumaLOG) 100 units/mL subcutaneous injection 1-5 Units, 1-5 Units, Subcutaneous, TID AC, 1 Units at 03/05/21 0853 **AND** Fingerstick Glucose (POCT), , , TID AC, Silvina Beebe MD    insulin lispro (HumaLOG) 100 units/mL subcutaneous injection 1-5 Units, 1-5 Units, Subcutaneous, HS, Gillian Simmonds, MD, 2 Units at 03/04/21 2134    insulin lispro (HumaLOG) 100 units/mL subcutaneous injection 10 Units, 10 Units, Subcutaneous, TID With Meals, Gillian Simmonds, MD, 10 Units at 03/05/21 0852    nicotine (NICODERM CQ) 21 mg/24 hr TD 24 hr patch 21 mg, 21 mg, Transdermal, Daily, Jennie Meyer MD, 21 mg at 03/04/21 1712    oxyCODONE (ROXICODONE) immediate release tablet 10 mg, 10 mg, Oral, Q4H PRN, Jennie Meyer MD, 10 mg at 03/04/21 2133    potassium chloride (K-DUR,KLOR-CON) CR tablet 20 mEq, 20 mEq, Oral, BID, Jennie Meyer MD, 20 mEq at 03/05/21 0855    senna (SENOKOT) tablet 17 2 mg, 2 tablet, Oral, Daily, Jennie Meyer MD, 17 2 mg at 03/05/21 0855      Physical Exam:   /84   Pulse 96   Temp 98 2 °F (36 8 °C)   Resp 18   Ht 5' 5" (1 651 m)   Wt 52 2 kg (115 lb)   SpO2 93%   BMI 19 14 kg/m²   GEN: no acute distress    RESP: breathing comfortably with no accessory muscle use    ABD: soft, non-tender, non-distended   INCISION:    EXT: no significant peripheral edema     MATTHEWS: in place draining clear yellow urine  no clots and       RADIOLOGY:   None     Assessment:   1  Prostatic abscess status post TURP  2  Left renal abscess status post IR drainage  3   Poorly controlled diabetes     Plan:   -DC Matthews catheter today-  -consider reimaging on Monday to assess the renal abscess  -urology will continue to follow

## 2021-03-05 NOTE — BRIEF OP NOTE (RAD/CATH)
INTERVENTIONAL RADIOLOGY PROCEDURE NOTE    Date: 3/5/2021    Procedure: R scapular aspiration    Preoperative diagnosis:   1  Abscess of prostate    2  DKA (diabetic ketoacidoses) ([facility] [address] )    3  Sepsis ([facility] )    4  Pyelonephritis    5  Type 2 diabetes mellitus with hyperglycemia, with long-term current use of insulin ([facility])    6  Acute pain of right shoulder    7  Renal abscess    8  Abscess of scapular region    9  MSSA (methicillin susceptible Staphylococcus aureus) infection         Postoperative diagnosis: Same  Surgeon: Domenic Farmer MD     Assistant: None  No qualified resident was available  Blood loss: 0 ml    Specimens: Sent to lab     Findings:   Aspiration of R scapular collection  2 mls pus removed  The collection was mostly phlegmon  Complications: None immediate      Anesthesia: local and IV Fentanyl

## 2021-03-05 NOTE — TELEMEDICINE
INTERPROFESSIONAL (PHONE) CONSULTATION - Interventional Radiology  Erinn Sierra 40 y o  male MRN: [id number]  Unit/Bed#: [facility] 522-01 Encounter: [id number]    IR has been consulted to evaluate the patient, determine the appropriate procedure, and whether or not a procedure can and should be performed regarding the care of Erinn Sierra  We were consulted by SLIM concerning scapular abscess, and to possibly perform a aspiration/drainage if medically appropriate for the patient  IP Consult to IR  Consult performed by: VENKAT Coronado  Consult ordered by: Morgan Torres MD        03/05/21      Assessment/Recommendation:     40year old male with recent left perirenal abscess drained on 3/4 by IR, now with right shoulder pain  US demonstrated a scapulothorasic bursitis and is suspicious for infection, given patient current clinical history  Dr Venkata Correa was called to ultrasound by Dr Valeria Cahnce during ultrasound, this area is amenable to aspiration and possibly drain placement  - plan for right scapular area aspiration/drain placement  - will send for fluid samples for culture       Total time spent in review of data, discussion with requesting provider and rendering advice was 20 minutes  Patient or appropriate family member was verbally informed by SLIM of this consultative service on their behalf to provide more timely access to specialty care in lieu of an in person consultation  Verbal consent was obtained  Thank you for allowing Interventional Radiology to participate in the care of Erinn Sierra  Please don't hesitate to call or TigerText us with any questions       [address] [place] [address] Paige Paul

## 2021-03-06 ENCOUNTER — APPOINTMENT (INPATIENT)
Dept: NON INVASIVE DIAGNOSTICS | Facility: HOSPITAL | Age: 46
DRG: 710 | End: 2021-03-06
Payer: COMMERCIAL

## 2021-03-06 LAB
ANION GAP SERPL CALCULATED.3IONS-SCNC: 6 MMOL/L (ref 4–13)
BACTERIA SPEC BFLD CULT: ABNORMAL
BUN SERPL-MCNC: 10 MG/DL (ref 5–25)
CALCIUM SERPL-MCNC: 9 MG/DL (ref 8.3–10.1)
CHLORIDE SERPL-SCNC: 99 MMOL/L (ref 100–108)
CO2 SERPL-SCNC: 29 MMOL/L (ref 21–32)
CREAT SERPL-MCNC: 0.47 MG/DL (ref 0.6–1.3)
ERYTHROCYTE [DISTWIDTH] IN BLOOD BY AUTOMATED COUNT: 11.9 % (ref 11.6–15.1)
GFR SERPL CREATININE-BSD FRML MDRD: 134 ML/MIN/1.73SQ M
GLUCOSE SERPL-MCNC: 140 MG/DL (ref 65–140)
GLUCOSE SERPL-MCNC: 206 MG/DL (ref 65–140)
GLUCOSE SERPL-MCNC: 243 MG/DL (ref 65–140)
GLUCOSE SERPL-MCNC: 255 MG/DL (ref 65–140)
GLUCOSE SERPL-MCNC: 257 MG/DL (ref 65–140)
GLUCOSE SERPL-MCNC: 283 MG/DL (ref 65–140)
GRAM STN SPEC: ABNORMAL
GRAM STN SPEC: ABNORMAL
HCT VFR BLD AUTO: 33.9 % (ref 36.5–49.3)
HGB BLD-MCNC: 11 G/DL (ref 12–17)
MCH RBC QN AUTO: 28.6 PG (ref 26.8–34.3)
MCHC RBC AUTO-ENTMCNC: 32.4 G/DL (ref 31.4–37.4)
MCV RBC AUTO: 88 FL (ref 82–98)
PLATELET # BLD AUTO: 484 THOUSANDS/UL (ref 149–390)
PMV BLD AUTO: 8.6 FL (ref 8.9–12.7)
POTASSIUM SERPL-SCNC: 3.8 MMOL/L (ref 3.5–5.3)
RBC # BLD AUTO: 3.85 MILLION/UL (ref 3.88–5.62)
SODIUM SERPL-SCNC: 134 MMOL/L (ref 136–145)
WBC # BLD AUTO: 17.75 THOUSAND/UL (ref 4.31–10.16)

## 2021-03-06 PROCEDURE — 93306 TTE W/DOPPLER COMPLETE: CPT | Performed by: INTERNAL MEDICINE

## 2021-03-06 PROCEDURE — 99024 POSTOP FOLLOW-UP VISIT: CPT | Performed by: UROLOGY

## 2021-03-06 PROCEDURE — 99232 SBSQ HOSP IP/OBS MODERATE 35: CPT | Performed by: INTERNAL MEDICINE

## 2021-03-06 PROCEDURE — 82948 REAGENT STRIP/BLOOD GLUCOSE: CPT

## 2021-03-06 PROCEDURE — 85027 COMPLETE CBC AUTOMATED: CPT | Performed by: INTERNAL MEDICINE

## 2021-03-06 PROCEDURE — 87389 HIV-1 AG W/HIV-1&-2 AB AG IA: CPT | Performed by: INTERNAL MEDICINE

## 2021-03-06 PROCEDURE — NC001 PR NO CHARGE: Performed by: INTERNAL MEDICINE

## 2021-03-06 PROCEDURE — 80048 BASIC METABOLIC PNL TOTAL CA: CPT | Performed by: INTERNAL MEDICINE

## 2021-03-06 PROCEDURE — 93306 TTE W/DOPPLER COMPLETE: CPT

## 2021-03-06 RX ORDER — INSULIN GLARGINE 100 [IU]/ML
35 INJECTION, SOLUTION SUBCUTANEOUS
Status: DISCONTINUED | OUTPATIENT
Start: 2021-03-06 | End: 2021-03-07

## 2021-03-06 RX ORDER — BISACODYL 10 MG
10 SUPPOSITORY, RECTAL RECTAL ONCE
Status: COMPLETED | OUTPATIENT
Start: 2021-03-06 | End: 2021-03-06

## 2021-03-06 RX ADMIN — ACETAMINOPHEN 975 MG: 325 TABLET ORAL at 05:00

## 2021-03-06 RX ADMIN — CEFAZOLIN SODIUM 2000 MG: 2 SOLUTION INTRAVENOUS at 12:13

## 2021-03-06 RX ADMIN — OXYCODONE HYDROCHLORIDE 10 MG: 10 TABLET ORAL at 23:01

## 2021-03-06 RX ADMIN — BISACODYL 10 MG: 10 SUPPOSITORY RECTAL at 12:12

## 2021-03-06 RX ADMIN — OXYCODONE HYDROCHLORIDE 10 MG: 10 TABLET ORAL at 05:00

## 2021-03-06 RX ADMIN — GABAPENTIN 300 MG: 300 CAPSULE ORAL at 18:19

## 2021-03-06 RX ADMIN — POLYETHYLENE GLYCOL 3350 17 G: 17 POWDER, FOR SOLUTION ORAL at 18:21

## 2021-03-06 RX ADMIN — INSULIN LISPRO 3 UNITS: 100 INJECTION, SOLUTION INTRAVENOUS; SUBCUTANEOUS at 18:19

## 2021-03-06 RX ADMIN — POTASSIUM CHLORIDE 20 MEQ: 1500 TABLET, EXTENDED RELEASE ORAL at 08:25

## 2021-03-06 RX ADMIN — POTASSIUM CHLORIDE 20 MEQ: 1500 TABLET, EXTENDED RELEASE ORAL at 18:18

## 2021-03-06 RX ADMIN — INSULIN LISPRO 15 UNITS: 100 INJECTION, SOLUTION INTRAVENOUS; SUBCUTANEOUS at 18:21

## 2021-03-06 RX ADMIN — HEPARIN SODIUM 5000 UNITS: 5000 INJECTION INTRAVENOUS; SUBCUTANEOUS at 14:19

## 2021-03-06 RX ADMIN — GABAPENTIN 300 MG: 300 CAPSULE ORAL at 08:25

## 2021-03-06 RX ADMIN — INSULIN LISPRO 10 UNITS: 100 INJECTION, SOLUTION INTRAVENOUS; SUBCUTANEOUS at 13:00

## 2021-03-06 RX ADMIN — INSULIN GLARGINE 35 UNITS: 100 INJECTION, SOLUTION SUBCUTANEOUS at 21:04

## 2021-03-06 RX ADMIN — HEPARIN SODIUM 5000 UNITS: 5000 INJECTION INTRAVENOUS; SUBCUTANEOUS at 21:04

## 2021-03-06 RX ADMIN — GABAPENTIN 300 MG: 300 CAPSULE ORAL at 21:03

## 2021-03-06 RX ADMIN — INSULIN LISPRO 2 UNITS: 100 INJECTION, SOLUTION INTRAVENOUS; SUBCUTANEOUS at 21:06

## 2021-03-06 RX ADMIN — POLYETHYLENE GLYCOL 3350 17 G: 17 POWDER, FOR SOLUTION ORAL at 08:25

## 2021-03-06 RX ADMIN — HEPARIN SODIUM 5000 UNITS: 5000 INJECTION INTRAVENOUS; SUBCUTANEOUS at 05:00

## 2021-03-06 RX ADMIN — CEFAZOLIN SODIUM 2000 MG: 2 SOLUTION INTRAVENOUS at 21:05

## 2021-03-06 RX ADMIN — ACETAMINOPHEN 975 MG: 325 TABLET ORAL at 21:03

## 2021-03-06 RX ADMIN — ACETAMINOPHEN 975 MG: 325 TABLET ORAL at 14:18

## 2021-03-06 RX ADMIN — CEFAZOLIN SODIUM 2000 MG: 2 SOLUTION INTRAVENOUS at 04:55

## 2021-03-06 RX ADMIN — INSULIN LISPRO 2 UNITS: 100 INJECTION, SOLUTION INTRAVENOUS; SUBCUTANEOUS at 08:24

## 2021-03-06 NOTE — ASSESSMENT & PLAN NOTE
· Patient with underlying poorly-controlled diabetes, poor insulin compliance, presented with bilateral back pain, fatigue, dysuria, and hematuria  · CT scan with Bilateral pyelonephritis with perinephric abscess and prostatic abscesses  · Status post cystoscopy with drainage of prostatic abscess    · S/p IR drainage of renal abscess  · Urine cultures positive for MSSA, surgical culture from renal abscess is growing GPCs in clusters  · Monitor drain output, may need reimaging or repositioning  · Renal abscess culture with MSSA as well

## 2021-03-06 NOTE — QUICK NOTE
40 y o  male, with poorly controlled DM, presented to ER on 02/28 with 2 week history of flank pain, lower abdominal pain and right shoulder pain  Patient was subsequently found to have prostate abscess, left renal abscess and right shoulder abscess  He is status post TURP and drainage of renal abscess  Abscess culture with growth of MSSA  Cardiology was brought on board to perform a HERMANN to evaluate valvular pathology in setting of bacteremia       /88   Pulse 84   Temp 98 2 °F (36 8 °C)   Resp 16   Ht 5' 5" (1 651 m)   Wt 52 2 kg (115 lb)   SpO2 95%   BMI 19 14 kg/m²     Plan:  - HERMANN on 3/8/2021  - NPO on Sunday after midnight

## 2021-03-06 NOTE — PROGRESS NOTES
UROLOGY PROGRESS NOTE   Patient Identifiers: Matthieu Bledsoe (MRN [id number])  Date of Service: 3/6/2021        Assessment:     1  Prostatic abscess  - status post trans urethral resection of prostate/unroofing of prostatic abscess  - currently doing well, voiding volitionally without a Matthews catheter    2  Left perinephric abscess  - status post percutaneous drain placement    Doing well from a urologic standpoint    Plan:   - continue workup for MSSA poly organ infection per primary team  - will likely be able to remove the perinephric drain prior to discharge  -urology will continue to follow          Subjective:     24 HR EVENTS:   no significant events  Patient has  no complaints        Objective:     VITALS:    Vitals:    03/06/21 0743   BP: 132/88   Pulse: 84   Resp:    Temp: 98 2 °F (36 8 °C)   SpO2: 95%       INS & OUTS:  [unfilled]    LABS:  Lab Results   Component Value Date    HGB 11 0 (L) 03/06/2021    HCT 33 9 (L) 03/06/2021    WBC 17 75 (H) 03/06/2021     (H) 03/06/2021   ]    Lab Results   Component Value Date    K 3 8 03/06/2021    CL 99 (L) 03/06/2021    CO2 29 03/06/2021    BUN 10 03/06/2021    CREATININE 0 47 (L) 03/06/2021    CALCIUM 9 0 03/06/2021   ]    INPATIENT MEDS:    Current Facility-Administered Medications:     acetaminophen (TYLENOL) tablet 975 mg, 975 mg, Oral, Q8H [address], Kari Torres MD, 975 mg at 03/06/21 0500    ceFAZolin (ANCEF) IVPB (premix in dextrose) 2,000 mg 50 mL, 2,000 mg, Intravenous, Q8H, Natanael Aldana MD, Last Rate: 100 mL/hr at 03/06/21 0455, 2,000 mg at 03/06/21 0455    gabapentin (NEURONTIN) capsule 300 mg, 300 mg, Oral, TID, Natanael Aldana MD, 300 mg at 03/06/21 0825    heparin (porcine) subcutaneous injection 5,000 Units, 5,000 Units, Subcutaneous, Q8H [address], 5,000 Units at 03/06/21 0500 **AND** [COMPLETED] Platelet count, , , Once, Tyrel Martinez DO    HYDROmorphone (DILAUDID) injection 1 mg, 1 mg, Intravenous, Q4H PRN, MD Alyssa Suárez insulin glargine (LANTUS) subcutaneous injection 35 Units 0 35 mL, 35 Units, Subcutaneous, HS, Elba Rosado MD    insulin lispro (HumaLOG) 100 units/mL subcutaneous injection 1-5 Units, 1-5 Units, Subcutaneous, TID AC, 2 Units at 03/06/21 0824 **AND** Fingerstick Glucose (POCT), , , TID AC, Roselyn Long MD    insulin lispro (HumaLOG) 100 units/mL subcutaneous injection 1-5 Units, 1-5 Units, Subcutaneous, HS, Roseyln Long MD, 2 Units at 03/05/21 2122    insulin lispro (HumaLOG) 100 units/mL subcutaneous injection 15 Units, 15 Units, Subcutaneous, TID With Meals, Suszanne Grana, MD Julieann Frankel  nicotine (NICODERM CQ) 21 mg/24 hr TD 24 hr patch 21 mg, 21 mg, Transdermal, Daily, Vernon Onela MD, 21 mg at 03/05/21 1948    oxyCODONE (ROXICODONE) immediate release tablet 10 mg, 10 mg, Oral, Q4H PRN, Vernon Oneal MD, 10 mg at 03/06/21 0500    oxyCODONE (ROXICODONE) IR tablet 5 mg, 5 mg, Oral, Q4H PRN, Vernon Oneal MD    polyethylene glycol (MIRALAX) packet 17 g, 17 g, Oral, BID AC, Vernon Oneal MD, 17 g at 03/06/21 0825    potassium chloride (K-DUR,KLOR-CON) CR tablet 20 mEq, 20 mEq, Oral, BID, Vernon Oneal MD, 20 mEq at 03/06/21 0825      Physical Exam:   /88   Pulse 84   Temp 98 2 °F (36 8 °C)   Resp 16   Ht 5' 5" (1 651 m)   Wt 52 2 kg (115 lb)   SpO2 95%   BMI 19 14 kg/m²   GEN: resting comfortably  RESP: breathing comfortably with no accessory muscle use  CV: no significant peripheral edema  ABD: soft, non-tender, non-distended  INCISION: none  DRAINS: Left perinephric drain serosanguineous scant output  EISENBERG: none

## 2021-03-06 NOTE — ASSESSMENT & PLAN NOTE
· POA, secondary MSSA abscesses found in prostate, kidney and likely right scapular area  · Patient presented with fever, tachycardia, leukocytosis  · Cultures positive for MSSA, change to IV cefazolin  · Given the usual presentation of MSSA isolated from the urinary tract endocarditis is suspected, awaiting HERMANN on monday  · HIV test pending

## 2021-03-06 NOTE — ASSESSMENT & PLAN NOTE
· Localized to right suprascapular area, found to have an age indetermine fracture of greater tuberosity and 5cm fluid collection of scapular area  · Reports having a fall in December while walking his dog injuring his shoulder and sustaining a laceration of the right hand from the leash     · Continue cefazolin  · Weight bearing as tolerated per ortho  · S/p IR drainage of 5cc purulent fluid, awaiting culture from this area

## 2021-03-06 NOTE — PROGRESS NOTES
Progress Note - Infectious Disease   Mary Arias 40 y o  male MRN: [id number]  Unit/Bed#: [facility] 522-01 Encounter: [id number]      Impression/Recommendations:  1  Sepsis, POA, presented with leukocytosis and tachycardia  Source of sepsis is most likely the multiple MSSA abscesses  Patient is clinically improved  WBC decreasing, although still elevated  HR  has normalized  He has remained hemodynamically stable, without hypotension  Admission blood cultures have no growth  Antibiotic plan as in below  Monitor temperature/WBC  Monitor hemodynamics      2  Multifocal MSSA abscesses, involving prostate, left kidney and probable right shoulder  Patient is status post drainage of prostate abscess and left renal abscess, with growth of MSSA  Staph aureus is not a common pathogen in UTI  Right shoulder abscess is now also drained, with culture pending  Given multifocal abscesses, I am concerned the patient was bacteremic at some point of time in the past, although blood cultures on admission have no growth  He may have endocarditis  What we are seeing now may be multifocal septic emboli  2D echo without vegetation  If there is growth of MSSA in right shoulder abscess also, patient will need HERMANN  If patient does indeed have endocarditis, he will need long-term IV antibiotic  On the other hand, if endocarditis is not present, with appropriate drainage of abscesses, he only needs p o  Antibiotic course  Continue high-dose IV cefazolin  Follow-up on right shoulder abscess culture  If right shoulder abscess has growth of MSSA also, will also check HERMANN  At this point, duration of IV antibiotic is unclear but I suspect the patient will need long-term IV antibiotic      3  Recent right hand laceration  This occurred back in December 2020, on a dog leash  Laceration appears to have healed  At present, there is no evidence of cellulitis  Per patient description, this laceration appears to have been small  However, given his very poorly controlled DM, there is risk for bacteremia even with small and insignificant loss of skin integrity  Concern for recent bacteremia with secondary endocarditis, as in above  Workup for possible endocarditis, as in above      4  DM, poorly controlled, with hyperglycemia and elevated hemoglobin A1c  This is clear risk factor for multifocal abscesses above  Patient counseled regarding the need to better control his blood sugar to prevent future complications  Management per primary service      Discussed with patient in detail regarding the above plan  Discussed with Dr Dailey from [facility] service  Antibiotics:  Cefazolin  Antibiotic # 6     Subjective:  Patient is status post drainage of right shoulder abscess  Right shoulder pain is a lot better  Abdominal and flank pain also improving  Temperature stays down  No chills  He is tolerating antibiotic well  No nausea, vomiting or diarrhea  Objective:  Vitals:  Temp:  [98 2 °F (36 8 °C)-98 9 °F (37 2 °C)] 98 9 °F (37 2 °C)  HR:  [78-91] 84  Resp:  [16-18] 18  BP: (120-142)/(80-88) 129/82  SpO2:  [95 %-97 %] 95 %  Temp (24hrs), Av 4 °F (36 9 °C), Min:98 2 °F (36 8 °C), Max:98 9 °F (37 2 °C)  Current: Temperature: 98 9 °F (37 2 °C)    Physical Exam:     General: Awake, alert, cooperative, no distress  Neck:  Supple  No mass  No lymphadenopathy  Lungs: Expansion symmetric, no rales, no wheezing, respirations unlabored  Heart:  Regular rate and rhythm, S1 and S2 normal, no murmur  Abdomen: Soft, nondistended, improved left flank tenderness, bowel sounds active all four quadrants, no masses, no organomegaly  Extremities: Right shoulder with drain in place  Moderate purulence fluid  Improved tenderness  No erythema/warmth  Skin:  No rash  Neuro: Moves all extremities  Invasive Devices     Peripheral Intravenous Line            Peripheral IV 21 Dorsal (posterior); Left Forearm 2 days          Drain Closed/Suction Drain Left Back Bulb 10 Fr  2 days                Labs studies:   I have personally reviewed pertinent labs  Results from last 7 days   Lab Units 03/06/21  0441 03/05/21  0454 03/04/21  0537  03/01/21  0445  02/28/21  1234   POTASSIUM mmol/L 3 8 3 4* 4 6   < > 3 8   < > 4 2   CHLORIDE mmol/L 99* 100 102   < > 103   < > 85*   CO2 mmol/L 29 31 29   < > 29   < > 32   BUN mg/dL 10 6 6   < > 8   < > 19   CREATININE mg/dL 0 47* 0 50* 0 44*   < > 0 42*   < > 0 86   EGFR ml/min/1 73sq m 134 131 138   < > 141   < > 105   CALCIUM mg/dL 9 0 8 9 8 9   < > 9 1   < > 9 6   AST U/L  --   --   --   --  7  --  5   ALT U/L  --   --   --   --  <6*  --  9*   ALK PHOS U/L  --   --   --   --  157*  --  148*    < > = values in this interval not displayed  Results from last 7 days   Lab Units 03/06/21 0441 03/05/21 0454 03/04/21  0537   WBC Thousand/uL 17 75* 18 01* 19 00*   HEMOGLOBIN g/dL 11 0* 10 9* 11 8*   PLATELETS Thousands/uL 484* 495* 496*     Results from last 7 days   Lab Units 03/05/21  1705 03/04/21  1259 03/01/21  1100 02/28/21  1244 02/28/21  1235 02/28/21  1234   BLOOD CULTURE   --   --   --   --  No Growth After 5 Days  No Growth After 5 Days  GRAM STAIN RESULT  3+ Polys  No organisms seen 4+ Polys*  3+ Gram positive cocci in clusters*  --   --   --   --    URINE CULTURE   --   --  70,000-79,000 cfu/ml Staphylococcus aureus* >100,000 cfu/ml Staphylococcus aureus*  --   --    BODY FLUID CULTURE, STERILE  Culture too young- will reincubate 4+ Growth of Staphylococcus aureus*  --   --   --   --        Imaging Studies:   I have personally reviewed pertinent imaging study reports and images in PACS  EKG, Pathology, and Other Studies:   I have personally reviewed pertinent reports

## 2021-03-06 NOTE — PLAN OF CARE
Problem: Potential for Falls  Goal: Patient will remain free of falls  Description: INTERVENTIONS:  - Assess patient frequently for physical needs  -  Identify cognitive and physical deficits and behaviors that affect risk of falls  -  [place] fall precautions as indicated by assessment   - Educate patient/family on patient safety including physical limitations  - Instruct patient to call for assistance with activity based on assessment  - Modify environment to reduce risk of injury  - Consider OT/PT consult to assist with strengthening/mobility  Outcome: Progressing     Problem: Prexisting or High Potential for Compromised Skin Integrity  Goal: Skin integrity is maintained or improved  Description: INTERVENTIONS:  - Identify patients at risk for skin breakdown  - Assess and monitor skin integrity  - Assess and monitor nutrition and hydration status  - Monitor labs   - Assess for incontinence   - Turn and reposition patient  - Assist with mobility/ambulation  - Relieve pressure over bony prominences  - Avoid friction and shearing  - Provide appropriate hygiene as needed including keeping skin clean and dry  - Evaluate need for skin moisturizer/barrier cream  - Collaborate with interdisciplinary team   - Patient/family teaching  - Consider wound care consult   Outcome: Progressing     Problem: Nutrition/Hydration-ADULT  Goal: Nutrient/Hydration intake appropriate for improving, restoring or maintaining nutritional needs  Description: Monitor and assess patient's nutrition/hydration status for malnutrition  Collaborate with interdisciplinary team and initiate plan and interventions as ordered  Monitor patient's weight and dietary intake as ordered or per policy  Utilize nutrition screening tool and intervene as necessary  Determine patient's food preferences and provide high-protein, high-caloric foods as appropriate       INTERVENTIONS:  - Monitor oral intake, urinary output, labs, and treatment plans  - Assess nutrition and hydration status and recommend course of action  - Evaluate amount of meals eaten  - Assist patient with eating if necessary   - Allow adequate time for meals  - Recommend/ encourage appropriate diets, oral nutritional supplements, and vitamin/mineral supplements  - Order, calculate, and assess calorie counts as needed  - Recommend, monitor, and adjust tube feedings and TPN/PPN based on assessed needs  - Assess need for intravenous fluids  - Provide specific nutrition/hydration education as appropriate  - Include patient/family/caregiver in decisions related to nutrition  Outcome: Progressing

## 2021-03-06 NOTE — ASSESSMENT & PLAN NOTE
Lab Results   Component Value Date    HGBA1C >14 0 (H) 03/01/2021       Recent Labs     03/05/21  1620 03/05/21  2101 03/06/21  0637 03/06/21  1119   POCGLU 302* 240* 255* 140       Blood Sugar Average: Last 72 hrs:  (P) 236 [id number]   · Poorly-controlled  · Cpeptide is low, likely pancreatic failure  · Noncompliance due to depression  · Endocrinology following, adjusting basal bolus insulin

## 2021-03-06 NOTE — PROGRESS NOTES
Progress Note - Natalia Brito 1975, 40 y o  male MRN: [id number]    Unit/Bed#: [facility] 522-01 Encounter: [id number]    Primary Care Provider: Annmarie Mosley DO   Date and time admitted to hospital: 2/28/2021 11:44 AM        * Pyelonephritis  Assessment & Plan  · Patient with underlying poorly-controlled diabetes, poor insulin compliance, presented with bilateral back pain, fatigue, dysuria, and hematuria  · CT scan with Bilateral pyelonephritis with perinephric abscess and prostatic abscesses  · Status post cystoscopy with drainage of prostatic abscess  · S/p IR drainage of renal abscess  · Urine cultures positive for MSSA, surgical culture from renal abscess is growing GPCs in clusters  · Monitor drain output, may need reimaging or repositioning  · Renal abscess culture with MSSA as well    Acute pain of right shoulder  Assessment & Plan  · Localized to right suprascapular area, found to have an age indetermine fracture of greater tuberosity and 5cm fluid collection of scapular area  · Reports having a fall in December while walking his dog injuring his shoulder and sustaining a laceration of the right hand from the leash     · Continue cefazolin  · Weight bearing as tolerated per ortho  · S/p IR drainage of 5cc purulent fluid, awaiting culture from this area    Prostate abscess  Assessment & Plan  Management as above    Sepsis ([address] )  Assessment & Plan  · POA, secondary MSSA abscesses found in prostate, kidney and likely right scapular area  · Patient presented with fever, tachycardia, leukocytosis  · Cultures positive for MSSA, change to IV cefazolin  · Given the usual presentation of MSSA isolated from the urinary tract endocarditis is suspected, awaiting HERMANN on monday  · HIV test pending    Tobacco abuse  Assessment & Plan  · Agreeable to nicotine patch at this time    Type 2 diabetes mellitus with hyperglycemia, with long-term current use of insulin [facility])  Assessment & Plan  Lab Results   Component Value Date HGBA1C >14 0 (H) 2021       Recent Labs     21  1620 21  2101 21  0637 21  1119   POCGLU 302* 240* 255* 140       Blood Sugar Average: Last 72 hrs:  (P) 236 [id number]   · Poorly-controlled  · Cpeptide is low, likely pancreatic failure  · Noncompliance due to depression  · Endocrinology following, adjusting basal bolus insulin        VTE Pharmacologic Prophylaxis:   Pharmacologic: Heparin  Mechanical VTE Prophylaxis in Place: Yes    Patient Centered Rounds: I have performed bedside rounds with nursing staff today  Discussions with Specialists or Other Care Team Provider: ID    Education and Discussions with Family / Patient: patient, plan of care    Time Spent for Care: 20 minutes  More than 50% of total time spent on counseling and coordination of care as described above  Current Length of Stay: 6 day(s)    Current Patient Status: Inpatient   Certification Statement: The patient will continue to require additional inpatient hospital stay due to awaiting Lopez    Discharge Plan: home when stable    Code Status: Level 1 - Full Code      Subjective:   Reports that his shoulder pain has improved post drainage  Objective:     Vitals:   Temp (24hrs), Av 4 °F (36 9 °C), Min:98 2 °F (36 8 °C), Max:98 9 °F (37 2 °C)    Temp:  [98 2 °F (36 8 °C)-98 9 °F (37 2 °C)] 98 9 °F (37 2 °C)  HR:  [78-91] 84  Resp:  [16-18] 18  BP: (120-142)/(80-88) 129/82  SpO2:  [95 %-97 %] 95 %  Body mass index is 19 14 kg/m²  Input and Output Summary (last 24 hours): Intake/Output Summary (Last 24 hours) at 3/6/2021 1539  Last data filed at 3/6/2021 1230  Gross per 24 hour   Intake 965 ml   Output 2810 ml   Net -1845 ml       Physical Exam:     Physical Exam  Constitutional:       Appearance: Normal appearance  HENT:      Head: Normocephalic and atraumatic  Mouth/Throat:      Mouth: Mucous membranes are moist       Pharynx: Oropharynx is clear     Eyes:      Extraocular Movements: Extraocular movements intact  Cardiovascular:      Rate and Rhythm: Normal rate and regular rhythm  Pulmonary:      Effort: Pulmonary effort is normal       Breath sounds: No wheezing or rales  Abdominal:      Comments: Renal drain with purulent serosanguinous    Skin:     General: Skin is warm and dry  Neurological:      General: No focal deficit present  Mental Status: He is alert and oriented to person, place, and time  Psychiatric:         Mood and Affect: Mood normal          Behavior: Behavior normal          Additional Data:     Labs:    Results from last 7 days   Lab Units 03/06/21  0441  03/02/21  0308  02/28/21  1234   WBC Thousand/uL 17 75*   < > 22 81*   < > 27 00*   HEMOGLOBIN g/dL 11 0*   < > 11 1*   < > 14 5   HEMATOCRIT % 33 9*   < > 33 3*   < > 41 7   PLATELETS Thousands/uL 484*   < > 433*   < > 562*   BANDS PCT %  --   --   --   --  3   NEUTROS PCT %  --   --  80*   < >  --    LYMPHS PCT %  --   --  8*   < >  --    LYMPHO PCT %  --   --   --   --  2*   MONOS PCT %  --   --  10   < >  --    MONO PCT %  --   --   --   --  8   EOS PCT %  --   --  0   < > 2    < > = values in this interval not displayed  Results from last 7 days   Lab Units 03/06/21  0441 03/01/21  0445   SODIUM mmol/L 134*   < > 137   POTASSIUM mmol/L 3 8   < > 3 8   CHLORIDE mmol/L 99*   < > 103   CO2 mmol/L 29   < > 29   BUN mg/dL 10   < > 8   CREATININE mg/dL 0 47*   < > 0 42*   ANION GAP mmol/L 6   < > 5   CALCIUM mg/dL 9 0   < > 9 1   ALBUMIN g/dL  --   --  2 0*   TOTAL BILIRUBIN mg/dL  --   --  0 16*   ALK PHOS U/L  --   --  157*   ALT U/L  --   --  <6*   AST U/L  --   --  7   GLUCOSE RANDOM mg/dL 243*   < > 124    < > = values in this interval not displayed       Results from last 7 days   Lab Units 03/04/21  0537   INR  0 96     Results from last 7 days   Lab Units 03/06/21  1119 03/06/21  0637 03/05/21  2101 03/05/21  1620 03/05/21  1159 03/05/21  0851 03/05/21  0613 03/04/21  1616 03/04/21  1114 03/04/21  0640 03/03/21  2103 03/03/21  1613   POC GLUCOSE mg/dl 140 255* 240* 302* 218* 208* 206* 186* 207* 281* 267* 235*     Results from last 7 days   Lab Units 03/01/21  0445   HEMOGLOBIN A1C % >14 0*     Results from last 7 days   Lab Units 03/01/21  0024 02/28/21  1234   LACTIC ACID mmol/L  --  1 5   PROCALCITONIN ng/ml 0 50* 0 35*           * I Have Reviewed All Lab Data Listed Above  * Additional Pertinent Lab Tests Reviewed: All Labs Within Last 24 Hours Reviewed      Recent Cultures (last 7 days):     Results from last 7 days   Lab Units 03/05/21  1705 03/04/21  1259 03/01/21  1100 02/28/21  1244 02/28/21  1235 02/28/21  1234   BLOOD CULTURE   --   --   --   --  No Growth After 5 Days  No Growth After 5 Days     GRAM STAIN RESULT  3+ Polys  No organisms seen 4+ Polys*  3+ Gram positive cocci in clusters*  --   --   --   --    URINE CULTURE   --   --  70,000-79,000 cfu/ml Staphylococcus aureus* >100,000 cfu/ml Staphylococcus aureus*  --   --    BODY FLUID CULTURE, STERILE  Culture too young- will reincubate 4+ Growth of Staphylococcus aureus*  --   --   --   --        Last 24 Hours Medication List:   Current Facility-Administered Medications   Medication Dose Route Frequency Provider Last Rate    acetaminophen  975 mg Oral Q8H [facility] Giovana Cassidy MD      cefazolin  2,000 mg Intravenous Q8H Ayesha Morocho MD 2,000 mg (03/06/21 1213)    gabapentin  300 mg Oral TID Ayesha Morocho MD      heparin (porcine)  5,000 Units Subcutaneous Q8H [facility] Giovana Cassidy MD      HYDROmorphone  1 mg Intravenous Q4H PRN Ayesha Morocho MD      insulin glargine  35 Units Subcutaneous HS Morris Watson MD      insulin lispro  1-5 Units Subcutaneous TID [facility] Hunter Todd MD      insulin lispro  1-5 Units Subcutaneous HS Hunter Todd MD      insulin lispro  15 Units Subcutaneous TID With Meals Morris Watson MD      nicotine  21 mg Transdermal Daily Ayesha Morocho MD      oxyCODONE  10 mg Oral Q4H PRN Arthurine Renetta Miranda Swan MD      oxyCODONE  5 mg Oral Q4H PRN Wali Garcia MD      polyethylene glycol  17 g Oral BID AC Wali Garcia MD      potassium chloride  20 mEq Oral BID Wali Garcia MD          Today, Patient Was Seen By: Levi Corrigan MD    ** Please Note: Dictation voice to text software may have been used in the creation of this document   **

## 2021-03-06 NOTE — PROGRESS NOTES
Progress Note - Maria Antonia Santiago 40 y o  male MRN: [id number]    Unit/Bed#: [address] 522-01 Encounter: [id number]      CC: diabetes f/u    Subjective:   Maria Antonia Santiago is a 40y o  year old male with type 2 diabetes admitted with bilateral pyelonephritis, perinephric abscess, prostate and shoulder abscesses, s/p drainage  Patient feels well and denies any complaint  He reports a great appetite  No hypoglycemia  Objective:     Vitals: Blood pressure 132/88, pulse 84, temperature 98 2 °F (36 8 °C), resp  rate 16, height 5' 5" (1 651 m), weight 52 2 kg (115 lb), SpO2 95 %  ,Body mass index is 19 14 kg/m²  Intake/Output Summary (Last 24 hours) at 3/6/2021 0850  Last data filed at 3/6/2021 0506  Gross per 24 hour   Intake 485 ml   Output 3810 ml   Net -3325 ml       Physical Exam:  General Appearance: awake, appears stated age and cooperative  Head: Normocephalic, without obvious abnormality, atraumatic  Extremities: moves all extremities  Skin: Skin color and temperature normal    Pulm: no labored breathing    Lab, Imaging and other studies: I have personally reviewed pertinent reports  POC Glucose (mg/dl)   Date Value   03/06/2021 255 (H)   03/05/2021 240 (H)   03/05/2021 302 (H)   03/05/2021 218 (H)   03/05/2021 208 (H)   03/04/2021 186 (H)   03/04/2021 207 (H)   03/04/2021 281 (H)   03/03/2021 267 (H)   03/03/2021 235 (H)       Assessment/ Plan:    -Type 2 diabetes:  Uncontrolled with A1c greater than 14%, goal is less than 7%  Home regimen consists of Basaglar 10 units q h s  and metformin, however, there is a concern about  noncompliance  Currently on Lantus 30 units q h s,  Humalog 12 units with meals and correctional insulin algorithm 2 with meals and 1 at bedtime  Blood glucose are high ranging from 240 to 300s mg/dL  Will increase Lantus to 35 units q h s  And Humalog to 15 units t i d  with meals    Use correctional insulin as needed and monitor blood glucose for further adjustment     -Pyelonephritis: management per primary team     -Prostate, peripnephric and shoulder abscesses:  management per primary team     Portions of the record may have been created with voice recognition software

## 2021-03-07 ENCOUNTER — TELEPHONE (OUTPATIENT)
Dept: RADIOLOGY | Facility: HOSPITAL | Age: 46
End: 2021-03-07

## 2021-03-07 ENCOUNTER — APPOINTMENT (INPATIENT)
Dept: RADIOLOGY | Facility: HOSPITAL | Age: 46
DRG: 710 | End: 2021-03-07
Payer: COMMERCIAL

## 2021-03-07 LAB
BACTERIA SPEC ANAEROBE CULT: NORMAL
GLUCOSE SERPL-MCNC: 196 MG/DL (ref 65–140)
GLUCOSE SERPL-MCNC: 249 MG/DL (ref 65–140)
GLUCOSE SERPL-MCNC: 252 MG/DL (ref 65–140)
GLUCOSE SERPL-MCNC: 266 MG/DL (ref 65–140)

## 2021-03-07 PROCEDURE — 99232 SBSQ HOSP IP/OBS MODERATE 35: CPT | Performed by: INTERNAL MEDICINE

## 2021-03-07 PROCEDURE — G1004 CDSM NDSC: HCPCS

## 2021-03-07 PROCEDURE — 82948 REAGENT STRIP/BLOOD GLUCOSE: CPT

## 2021-03-07 PROCEDURE — 99024 POSTOP FOLLOW-UP VISIT: CPT | Performed by: UROLOGY

## 2021-03-07 PROCEDURE — NC001 PR NO CHARGE: Performed by: STUDENT IN AN ORGANIZED HEALTH CARE EDUCATION/TRAINING PROGRAM

## 2021-03-07 PROCEDURE — 74170 CT ABD WO CNTRST FLWD CNTRST: CPT

## 2021-03-07 RX ORDER — DIPHENHYDRAMINE HCL 25 MG
25 TABLET ORAL
Status: DISCONTINUED | OUTPATIENT
Start: 2021-03-07 | End: 2021-03-09 | Stop reason: HOSPADM

## 2021-03-07 RX ORDER — INSULIN GLARGINE 100 [IU]/ML
38 INJECTION, SOLUTION SUBCUTANEOUS
Status: DISCONTINUED | OUTPATIENT
Start: 2021-03-07 | End: 2021-03-08

## 2021-03-07 RX ADMIN — INSULIN LISPRO 2 UNITS: 100 INJECTION, SOLUTION INTRAVENOUS; SUBCUTANEOUS at 11:48

## 2021-03-07 RX ADMIN — INSULIN LISPRO 18 UNITS: 100 INJECTION, SOLUTION INTRAVENOUS; SUBCUTANEOUS at 11:48

## 2021-03-07 RX ADMIN — OXYCODONE HYDROCHLORIDE 10 MG: 10 TABLET ORAL at 17:17

## 2021-03-07 RX ADMIN — ACETAMINOPHEN 975 MG: 325 TABLET ORAL at 21:09

## 2021-03-07 RX ADMIN — INSULIN LISPRO 1 UNITS: 100 INJECTION, SOLUTION INTRAVENOUS; SUBCUTANEOUS at 21:11

## 2021-03-07 RX ADMIN — HEPARIN SODIUM 5000 UNITS: 5000 INJECTION INTRAVENOUS; SUBCUTANEOUS at 14:03

## 2021-03-07 RX ADMIN — HEPARIN SODIUM 5000 UNITS: 5000 INJECTION INTRAVENOUS; SUBCUTANEOUS at 05:17

## 2021-03-07 RX ADMIN — CEFAZOLIN SODIUM 2000 MG: 2 SOLUTION INTRAVENOUS at 11:49

## 2021-03-07 RX ADMIN — HEPARIN SODIUM 5000 UNITS: 5000 INJECTION INTRAVENOUS; SUBCUTANEOUS at 21:10

## 2021-03-07 RX ADMIN — INSULIN LISPRO 18 UNITS: 100 INJECTION, SOLUTION INTRAVENOUS; SUBCUTANEOUS at 17:07

## 2021-03-07 RX ADMIN — INSULIN LISPRO 2 UNITS: 100 INJECTION, SOLUTION INTRAVENOUS; SUBCUTANEOUS at 17:07

## 2021-03-07 RX ADMIN — GABAPENTIN 300 MG: 300 CAPSULE ORAL at 21:10

## 2021-03-07 RX ADMIN — INSULIN GLARGINE 38 UNITS: 100 INJECTION, SOLUTION SUBCUTANEOUS at 21:10

## 2021-03-07 RX ADMIN — GABAPENTIN 300 MG: 300 CAPSULE ORAL at 17:07

## 2021-03-07 RX ADMIN — ACETAMINOPHEN 975 MG: 325 TABLET ORAL at 05:17

## 2021-03-07 RX ADMIN — ACETAMINOPHEN 975 MG: 325 TABLET ORAL at 14:03

## 2021-03-07 RX ADMIN — NICOTINE 21 MG: 21 PATCH, EXTENDED RELEASE TRANSDERMAL at 17:06

## 2021-03-07 RX ADMIN — GABAPENTIN 300 MG: 300 CAPSULE ORAL at 08:08

## 2021-03-07 RX ADMIN — OXYCODONE HYDROCHLORIDE 5 MG: 5 TABLET ORAL at 05:22

## 2021-03-07 RX ADMIN — CEFAZOLIN SODIUM 2000 MG: 2 SOLUTION INTRAVENOUS at 21:13

## 2021-03-07 RX ADMIN — IOHEXOL 100 ML: 350 INJECTION, SOLUTION INTRAVENOUS at 15:32

## 2021-03-07 RX ADMIN — POLYETHYLENE GLYCOL 3350 17 G: 17 POWDER, FOR SOLUTION ORAL at 17:06

## 2021-03-07 RX ADMIN — CEFAZOLIN SODIUM 2000 MG: 2 SOLUTION INTRAVENOUS at 05:15

## 2021-03-07 RX ADMIN — DIPHENHYDRAMINE HCL 25 MG: 25 TABLET ORAL at 21:11

## 2021-03-07 RX ADMIN — INSULIN LISPRO 2 UNITS: 100 INJECTION, SOLUTION INTRAVENOUS; SUBCUTANEOUS at 08:09

## 2021-03-07 RX ADMIN — POLYETHYLENE GLYCOL 3350 17 G: 17 POWDER, FOR SOLUTION ORAL at 06:07

## 2021-03-07 RX ADMIN — INSULIN LISPRO 15 UNITS: 100 INJECTION, SOLUTION INTRAVENOUS; SUBCUTANEOUS at 08:08

## 2021-03-07 NOTE — ASSESSMENT & PLAN NOTE
· Patient with underlying poorly-controlled diabetes, poor insulin compliance, presented with bilateral back pain, fatigue, dysuria, and hematuria  · CT scan with Bilateral pyelonephritis with perinephric abscess and prostatic abscesses  · Status post cystoscopy with drainage of prostatic abscess    · S/p IR drainage of renal abscess  · Urine cultures positive for MSSA, surgical culture from renal abscess is growing GPCs in clusters  · Monitor drain output,   · Check repeat CT abdomen  · Renal abscess culture with MSSA as well

## 2021-03-07 NOTE — PLAN OF CARE
Problem: Potential for Falls  Goal: Patient will remain free of falls  Description: INTERVENTIONS:  - Assess patient frequently for physical needs  -  Identify cognitive and physical deficits and behaviors that affect risk of falls  -  [place] fall precautions as indicated by assessment   - Educate patient/family on patient safety including physical limitations  - Instruct patient to call for assistance with activity based on assessment  - Modify environment to reduce risk of injury  - Consider OT/PT consult to assist with strengthening/mobility  3/7/2021 0006 by Winter Fuchs  Outcome: Progressing  3/7/2021 0006 by Winter Fuchs  Outcome: Progressing     Problem: Prexisting or High Potential for Compromised Skin Integrity  Goal: Skin integrity is maintained or improved  Description: INTERVENTIONS:  - Identify patients at risk for skin breakdown  - Assess and monitor skin integrity  - Assess and monitor nutrition and hydration status  - Monitor labs   - Assess for incontinence   - Turn and reposition patient  - Assist with mobility/ambulation  - Relieve pressure over bony prominences  - Avoid friction and shearing  - Provide appropriate hygiene as needed including keeping skin clean and dry  - Evaluate need for skin moisturizer/barrier cream  - Collaborate with interdisciplinary team   - Patient/family teaching  - Consider wound care consult   3/7/2021 0006 by Winter Fuchs  Outcome: Progressing  3/7/2021 0006 by Winter Fuchs  Outcome: Progressing     Problem: Nutrition/Hydration-ADULT  Goal: Nutrient/Hydration intake appropriate for improving, restoring or maintaining nutritional needs  Description: Monitor and assess patient's nutrition/hydration status for malnutrition  Collaborate with interdisciplinary team and initiate plan and interventions as ordered  Monitor patient's weight and dietary intake as ordered or per policy   Utilize nutrition screening tool and intervene as necessary  Determine patient's food preferences and provide high-protein, high-caloric foods as appropriate       INTERVENTIONS:  - Monitor oral intake, urinary output, labs, and treatment plans  - Assess nutrition and hydration status and recommend course of action  - Evaluate amount of meals eaten  - Assist patient with eating if necessary   - Allow adequate time for meals  - Recommend/ encourage appropriate diets, oral nutritional supplements, and vitamin/mineral supplements  - Order, calculate, and assess calorie counts as needed  - Recommend, monitor, and adjust tube feedings and TPN/PPN based on assessed needs  - Assess need for intravenous fluids  - Provide specific nutrition/hydration education as appropriate  - Include patient/family/caregiver in decisions related to nutrition  3/7/2021 0006 by Morris Haskins  Outcome: Progressing  3/7/2021 0006 by Morris Haskins  Outcome: Progressing     Problem: GENITOURINARY - ADULT  Goal: Maintains or returns to baseline urinary function  Description: INTERVENTIONS:  - Assess urinary function  - Encourage oral fluids to ensure adequate hydration if ordered  - Administer IV fluids as ordered to ensure adequate hydration  - Administer ordered medications as needed  - Offer frequent toileting  - Follow urinary retention protocol if ordered  Outcome: Progressing  Goal: Absence of urinary retention  Description: INTERVENTIONS:  - Assess patients ability to void and empty bladder  - Monitor I/O  - Bladder scan as needed  - Discuss with physician/AP medications to alleviate retention as needed  - Discuss catheterization for long term situations as appropriate  Outcome: Progressing     Problem: METABOLIC, FLUID AND ELECTROLYTES - ADULT  Goal: Electrolytes maintained within normal limits  Description: INTERVENTIONS:  - Monitor labs and assess patient for signs and symptoms of electrolyte imbalances  - Administer electrolyte replacement as ordered  - Monitor response to electrolyte replacements, including repeat lab results as appropriate  - Instruct patient on fluid and nutrition as appropriate  Outcome: Progressing  Goal: Fluid balance maintained  Description: INTERVENTIONS:  - Monitor labs   - Monitor I/O and WT  - Instruct patient on fluid and nutrition as appropriate  - Assess for signs & symptoms of volume excess or deficit  Outcome: Progressing  Goal: Glucose maintained within target range  Description: INTERVENTIONS:  - Monitor Blood Glucose as ordered  - Assess for signs and symptoms of hyperglycemia and hypoglycemia  - Administer ordered medications to maintain glucose within target range  - Assess nutritional intake and initiate nutrition service referral as needed  Outcome: Progressing     Problem: SKIN/TISSUE INTEGRITY - ADULT  Goal: Skin integrity remains intact  Description: INTERVENTIONS  - Identify patients at risk for skin breakdown  - Assess and monitor skin integrity  - Assess and monitor nutrition and hydration status  - Monitor labs (i e  albumin)  - Assess for incontinence   - Turn and reposition patient  - Assist with mobility/ambulation  - Relieve pressure over bony prominences  - Avoid friction and shearing  - Provide appropriate hygiene as needed including keeping skin clean and dry  - Evaluate need for skin moisturizer/barrier cream  - Collaborate with interdisciplinary team (i e  Nutrition, Rehabilitation, etc )   - Patient/family teaching  Outcome: Progressing     Problem: MUSCULOSKELETAL - ADULT  Goal: Maintain or return mobility to safest level of function  Description: INTERVENTIONS:  - Assess patient's ability to carry out ADLs; assess patient's baseline for ADL function and identify physical deficits which impact ability to perform ADLs (bathing, care of mouth/teeth, toileting, grooming, dressing, etc )  - Assess/evaluate cause of self-care deficits   - Assess range of motion  - Assess patient's mobility  - Assess patient's need for assistive devices and provide as appropriate  - Encourage maximum independence but intervene and supervise when necessary  - Involve family in performance of ADLs  - Assess for home care needs following discharge   - Consider OT consult to assist with ADL evaluation and planning for discharge  - Provide patient education as appropriate  Outcome: Progressing  Goal: Maintain proper alignment of affected body part  Description: INTERVENTIONS:  - Support, maintain and protect limb and body alignment  - Provide patient/ family with appropriate education  Outcome: Progressing

## 2021-03-07 NOTE — ASSESSMENT & PLAN NOTE
· POA, secondary MSSA abscesses found in prostate, kidney and right scapular area  · Patient presented with fever, tachycardia, leukocytosis  · Cultures positive for MSSA, continue  IV cefazolin  · Given the usual presentation of MSSA isolated from the urinary tract endocarditis is suspected, awaiting HERMANN on monday  · HIV test pending

## 2021-03-07 NOTE — PROGRESS NOTES
Progress Note - Infectious Disease   Heriberto Nunez 40 y o  male MRN: [id number]  Unit/Bed#: [facility] 522-01 Encounter: [id number]      Impression/Recommendations:  1  Sepsis, POA, presented with leukocytosis and tachycardia   Source of sepsis is most likely the multiple MSSA abscesses   Patient is clinically improved   WBC decreasing, although still elevated   HR  has normalized  [facility] has remained hemodynamically stable, without hypotension   Admission blood cultures have no growth  Antibiotic plan as in below  Monitor temperature/WBC  Monitor hemodynamics      2  Multifocal MSSA abscesses, involving prostate, left kidney and probable right shoulder   Patient is status post drainage of prostate abscess and left renal abscess, with growth of MSSA   Staph aureus is not a common pathogen in UTI  Right shoulder abscess is now also drained, with culture also having growth of Staph aureus  Given multifocal abscesses, I am concerned the patient was bacteremic at some point of time in the past, although blood cultures on admission have no growth   He may have endocarditis   What we are seeing now may be multifocal septic emboli    2D echo without vegetation   Patient will need HERMANN   If patient does indeed have endocarditis, he will need long-term IV antibiotic   On the other hand, if endocarditis is not present, with appropriate drainage of abscesses, he only needs p o  Antibiotic course  Continue high-dose IV cefazolin  Follow-up on right shoulder abscess culture  Check HERMANN    At this point, duration of IV antibiotic is unclear but I suspect the patient will need long-term IV antibiotic      3  Recent right hand laceration   This occurred back in December 2020, on a dog leash   Laceration appears to have healed   At present, there is no evidence of cellulitis   Per patient description, this laceration appears to have been small   However, given his very poorly controlled DM, there is risk for bacteremia even with small and insignificant loss of skin integrity   Concern for recent bacteremia with secondary endocarditis, as in above  Workup for possible endocarditis, as in above      4  DM, poorly controlled, with hyperglycemia and elevated hemoglobin A1c   This is clear risk factor for multifocal abscesses above   Patient counseled regarding the need to better control his blood sugar to prevent future complications  Management per primary service      Discussed with patient in detail regarding the above plan  Discussed with Dr Dailey from [facility] service earlier      Antibiotics:  Cefazolin  Antibiotic # 7      Subjective:  Patient's right shoulder pain continues to improve  Abdominal and flank pain also improving  Temperature stays down  No chills  He is tolerating antibiotic well  No nausea, vomiting or diarrhea      Objective:  Vitals:  Temp:  [98 2 °F (36 8 °C)-98 9 °F (37 2 °C)] 98 2 °F (36 8 °C)  HR:  [85-92] 85  Resp:  [12-18] 16  BP: (129-154)/(82-99) 154/99  SpO2:  [96 %-97 %] 97 %  Temp (24hrs), Av 6 °F (37 °C), Min:98 2 °F (36 8 °C), Max:98 9 °F (37 2 °C)  Current: Temperature: 98 2 °F (36 8 °C)    Physical Exam:     General: Awake, alert, cooperative, no distress  Neck:  Supple  No mass  No lymphadenopathy  Lungs: Expansion symmetric, no rales, no wheezing, respirations unlabored  Heart:  Regular rate and rhythm, S1 and S2 normal, no murmur  Abdomen: Soft, nondistended, improved flank tenderness, bowel sounds active all four quadrants, no masses, no organomegaly  Extremities: No edema  No erythema/warmth  No ulcer  Improved shoulder tenderness  Skin:  No rash  Neuro: Moves all extremities  Invasive Devices     Peripheral Intravenous Line            Peripheral IV 21 Dorsal (posterior); Left Forearm 3 days          Drain            Closed/Suction Drain Left Back Bulb 10 Fr  3 days                Labs studies:   I have personally reviewed pertinent labs    Results from last 7 days   Lab Units 03/06/21  0441 03/05/21  0454 03/04/21  0537  03/01/21  0445   POTASSIUM mmol/L 3 8 3 4* 4 6   < > 3 8   CHLORIDE mmol/L 99* 100 102   < > 103   CO2 mmol/L 29 31 29   < > 29   BUN mg/dL 10 6 6   < > 8   CREATININE mg/dL 0 47* 0 50* 0 44*   < > 0 42*   EGFR ml/min/1 73sq m 134 131 138   < > 141   CALCIUM mg/dL 9 0 8 9 8 9   < > 9 1   AST U/L  --   --   --   --  7   ALT U/L  --   --   --   --  <6*   ALK PHOS U/L  --   --   --   --  157*    < > = values in this interval not displayed  Results from last 7 days   Lab Units 03/06/21 0441 03/05/21  0454 03/04/21  0537   WBC Thousand/uL 17 75* 18 01* 19 00*   HEMOGLOBIN g/dL 11 0* 10 9* 11 8*   PLATELETS Thousands/uL 484* 495* 496*     Results from last 7 days   Lab Units 03/05/21  1705 03/04/21  1259 03/01/21  1100   GRAM STAIN RESULT  3+ Polys  No organisms seen 4+ Polys*  3+ Gram positive cocci in clusters*  --    URINE CULTURE   --   --  70,000-79,000 cfu/ml Staphylococcus aureus*   BODY FLUID CULTURE, STERILE  2+ Growth of Staphylococcus aureus* 4+ Growth of Staphylococcus aureus*  --        Imaging Studies:   I have personally reviewed pertinent imaging study reports and images in PACS  EKG, Pathology, and Other Studies:   I have personally reviewed pertinent reports

## 2021-03-07 NOTE — PROGRESS NOTES
UROLOGY PROGRESS NOTE   Patient Identifiers: Zachary Piper (MRN [id number])  Date of Service: 3/7/2021        Assessment:     1  Prostatic abscess  - status post trans urethral resection of prostate/unroofing of prostatic abscess  - currently doing well, voiding volitionally without a Matthews catheter    2  Left perinephric abscess  - status post percutaneous drain placement    Doing well from a urologic standpoint    Plan:   - change in quality of drain output is noted  -will obtain a CT abdomen with without contrast today to evaluate for resolution of perinephric abscess  -if the abscess is completely resolved, will likely plan for drain removal with interventional radiology tomorrow          Subjective:     24 HR EVENTS:   no significant events  Patient has  no complaints        Objective:     VITALS:    Vitals:    03/07/21 0704   BP: 154/99   Pulse: 85   Resp: 16   Temp: 98 2 °F (36 8 °C)   SpO2: 97%       INS & OUTS:  [unfilled]    LABS:  Lab Results   Component Value Date    HGB 11 0 (L) 03/06/2021    HCT 33 9 (L) 03/06/2021    WBC 17 75 (H) 03/06/2021     (H) 03/06/2021   ]    Lab Results   Component Value Date    K 3 8 03/06/2021    CL 99 (L) 03/06/2021    CO2 29 03/06/2021    BUN 10 03/06/2021    CREATININE 0 47 (L) 03/06/2021    CALCIUM 9 0 03/06/2021   ]    INPATIENT MEDS:    Current Facility-Administered Medications:     acetaminophen (TYLENOL) tablet 975 mg, 975 mg, Oral, Q8H [address], Donna Huff MD, 975 mg at 03/07/21 0517    ceFAZolin (ANCEF) IVPB (premix in dextrose) 2,000 mg 50 mL, 2,000 mg, Intravenous, Q8H, Miroslava Madera MD, Last Rate: 100 mL/hr at 03/07/21 1149, 2,000 mg at 03/07/21 1149    diphenhydrAMINE (BENADRYL) tablet 25 mg, 25 mg, Oral, HS PRN, Miroslava Madera MD    gabapentin (NEURONTIN) capsule 300 mg, 300 mg, Oral, TID, Miroslava Madera MD, 300 mg at 03/07/21 0808    heparin (porcine) subcutaneous injection 5,000 Units, 5,000 Units, Subcutaneous, Q8H [address], 5,000 Units at 03/07/21 0517 **AND** [COMPLETED] Platelet count, , , Once, Rashmi Bland,     HYDROmorphone (DILAUDID) injection 1 mg, 1 mg, Intravenous, Q4H PRN, Magayls Subramanian MD    insulin glargine (LANTUS) subcutaneous injection 38 Units 0 38 mL, 38 Units, Subcutaneous, HS, Nyasia Bernal MD    insulin lispro (HumaLOG) 100 units/mL subcutaneous injection 1-5 Units, 1-5 Units, Subcutaneous, TID AC, 2 Units at 03/07/21 1148 **AND** Fingerstick Glucose (POCT), , , TID AC, Rosetta Duffy MD    insulin lispro (HumaLOG) 100 units/mL subcutaneous injection 1-5 Units, 1-5 Units, Subcutaneous, HS, Rosetta Duffy MD, 2 Units at 03/06/21 2106    insulin lispro (HumaLOG) 100 units/mL subcutaneous injection 18 Units, 18 Units, Subcutaneous, TID With Meals, Nyasia Bernal MD, 18 Units at 03/07/21 1148    nicotine (NICODERM CQ) 21 mg/24 hr TD 24 hr patch 21 mg, 21 mg, Transdermal, Daily, Magalys Subramanian MD, 21 mg at 03/05/21 1948    oxyCODONE (ROXICODONE) immediate release tablet 10 mg, 10 mg, Oral, Q4H PRN, Magalys Subramanian MD, 10 mg at 03/06/21 2301    oxyCODONE (ROXICODONE) IR tablet 5 mg, 5 mg, Oral, Q4H PRN, Magalys Subramanian MD, 5 mg at 03/07/21 0522    polyethylene glycol (MIRALAX) packet 17 g, 17 g, Oral, BID AC, Magalys Subramanian MD, 17 g at 03/07/21 0607      Physical Exam:   /99   Pulse 85   Temp 98 2 °F (36 8 °C)   Resp 16   Ht 5' 5" (1 651 m)   Wt 52 2 kg (115 lb)   SpO2 97%   BMI 19 14 kg/m²   GEN: resting comfortably  RESP: breathing comfortably with no accessory muscle use  CV: no significant peripheral edema  ABD: soft, non-tender, non-distended  INCISION: none  DRAINS: Left perinephric drain serosanguineous scant output  EISENBERG: none

## 2021-03-07 NOTE — PROGRESS NOTES
Progress Note - Eric Serrano 40 y o  male MRN: [id number]    Unit/Bed#: [address] 522-01 Encounter: [id number]      CC: diabetes f/u    Subjective:   Eric Serrano is a 40y o  year old male with type 2 diabetes, admitted with bilateral pyelonephritis, perinephric abscess and Prostate abscess status post drainage  Feels well  No complaints  No hypoglycemia  Objective:     Vitals: Blood pressure 154/99, pulse 85, temperature 98 2 °F (36 8 °C), resp  rate 16, height 5' 5" (1 651 m), weight 52 2 kg (115 lb), SpO2 97 %  ,Body mass index is 19 14 kg/m²  Intake/Output Summary (Last 24 hours) at 3/7/2021 1058  Last data filed at 3/7/2021 0800  Gross per 24 hour   Intake 3350 ml   Output 1600 ml   Net 1750 ml       Physical Exam:  General Appearance: awake, appears stated age and cooperative  Head: Normocephalic, without obvious abnormality, atraumatic  Extremities: moves all extremities  Skin: Skin color and temperature normal    Pulm: no labored breathing    Lab, Imaging and other studies: I have personally reviewed pertinent reports  POC Glucose (mg/dl)   Date Value   03/07/2021 266 (H)   03/06/2021 257 (H)   03/06/2021 283 (H)   03/06/2021 140   03/06/2021 255 (H)   03/05/2021 240 (H)   03/05/2021 302 (H)   03/05/2021 218 (H)   03/05/2021 208 (H)   03/05/2021 206 (H)       Assessment/ Plan:    -Uncontrolled type 2 diabetes: A1c >14%, goal is less than 7%  Home regimen consists of Basaglar 10 units q h s  and metformin with a  concern about  noncompliance      Currently on Lantus 30 units q h s,  Humalog 12 units with meals and correctional insulin algorithm 2 with meals and 1 at bedtime  Blood glucose are high ranging from 240 to 300s mg/dL  Will increase Lantus to 38 units q h s  And Humalog to 18 units t i d  with meals    Use correctional insulin as needed and monitor blood glucose for further adjustment      -Pyelonephritis:  management per primary team      -Prostate, peripnephric and shoulder abscesses: management per primary team        Portions of the record may have been created with voice recognition software

## 2021-03-07 NOTE — ASSESSMENT & PLAN NOTE
Lab Results   Component Value Date    HGBA1C >14 0 (H) 03/01/2021       Recent Labs     03/06/21  1608 03/06/21  2105 03/07/21  0633 03/07/21  1105   POCGLU 283* 257* 266* 252*       Blood Sugar Average: Last 72 hrs:  (P) 235 [id number]   · Poorly-controlled  · Cpeptide is low, likely pancreatic failure  · History of noncompliance due to depression  · Endocrinology following, adjusting basal bolus insulin

## 2021-03-07 NOTE — PROGRESS NOTES
Progress Note - Adi Kaba 1975, 40 y o  male MRN: [id number]    Unit/Bed#: [facility] 522-01 Encounter: [id number]    Primary Care Provider: David Samayoa DO   Date and time admitted to hospital: 2/28/2021 11:44 AM        * Sepsis ([address] )  Assessment & Plan  · POA, secondary MSSA abscesses found in prostate, kidney and right scapular area  · Patient presented with fever, tachycardia, leukocytosis  · Cultures positive for MSSA, continue  IV cefazolin  · Given the usual presentation of MSSA isolated from the urinary tract endocarditis is suspected, awaiting HERMANN on monday  · HIV test pending    Acute pain of right shoulder  Assessment & Plan  · Localized to right suprascapular area, found to have an age indetermine fracture of greater tuberosity and 5cm fluid collection of scapular area  · Reports having a fall in December while walking his dog injuring his shoulder and sustaining a laceration of the right hand from the leash  · Continue cefazolin  · Weight bearing as tolerated per ortho  · S/p IR drainage of 5cc purulent fluid, growing MSSA    Prostate abscess  Assessment & Plan  S/p cysto and drainage  Urine culture with MSSA  Possibly due to septic emboli    Pyelonephritis  Assessment & Plan  · Patient with underlying poorly-controlled diabetes, poor insulin compliance, presented with bilateral back pain, fatigue, dysuria, and hematuria  · CT scan with Bilateral pyelonephritis with perinephric abscess and prostatic abscesses  · Status post cystoscopy with drainage of prostatic abscess    · S/p IR drainage of renal abscess  · Urine cultures positive for MSSA, surgical culture from renal abscess is growing GPCs in clusters  · Monitor drain output,   · Check repeat CT abdomen  · Renal abscess culture with MSSA as well    Tobacco abuse  Assessment & Plan  · Agreeable to nicotine patch at this time    Type 2 diabetes mellitus with hyperglycemia, with long-term current use of insulin [facility])  Assessment & Plan  Lab Results   Component Value Date    HGBA1C >14 0 (H) 2021       Recent Labs     21  1608 21  2105 21  0633 21  1105   POCGLU 283* 257* 266* 252*       Blood Sugar Average: Last 72 hrs:  (P) 235 [id number]   · Poorly-controlled  · Cpeptide is low, likely pancreatic failure  · History of noncompliance due to depression  · Endocrinology following, adjusting basal bolus insulin        VTE Pharmacologic Prophylaxis:   Pharmacologic: Heparin  Mechanical VTE Prophylaxis in Place: Yes    Patient Centered Rounds: I have performed bedside rounds with nursing staff today  Education and Discussions with Family / Patient: patient, plan of care    Time Spent for Care: 20 minutes  More than 50% of total time spent on counseling and coordination of care as described above  Current Length of Stay: 7 day(s)    Current Patient Status: Inpatient   Certification Statement: The patient will continue to require additional inpatient hospital stay due to HERMANN tomorrow    Discharge Plan: home when stable    Code Status: Level 1 - Full Code      Subjective:   Denies any new complaints  Shoulder is improved  Objective:     Vitals:   Temp (24hrs), Av 6 °F (37 °C), Min:98 2 °F (36 8 °C), Max:98 9 °F (37 2 °C)    Temp:  [98 2 °F (36 8 °C)-98 9 °F (37 2 °C)] 98 2 °F (36 8 °C)  HR:  [85-92] 85  Resp:  [12-18] 16  BP: (129-154)/(82-99) 154/99  SpO2:  [96 %-97 %] 97 %  Body mass index is 19 14 kg/m²  Input and Output Summary (last 24 hours): Intake/Output Summary (Last 24 hours) at 3/7/2021 1320  Last data filed at 3/7/2021 0800  Gross per 24 hour   Intake 2390 ml   Output 1600 ml   Net 790 ml       Physical Exam:      Physical Exam  Constitutional:       Appearance: Normal appearance  HENT:      Head: Normocephalic and atraumatic  Cardiovascular:      Rate and Rhythm: Normal rate and regular rhythm     Pulmonary:      Effort: Pulmonary effort is normal    Musculoskeletal:         General: Tenderness present  No swelling  Skin:     General: Skin is warm and dry  Neurological:      Mental Status: He is alert  Additional Data:     Labs:    Results from last 7 days   Lab Units 03/06/21  0441  03/02/21  0308   WBC Thousand/uL 17 75*   < > 22 81*   HEMOGLOBIN g/dL 11 0*   < > 11 1*   HEMATOCRIT % 33 9*   < > 33 3*   PLATELETS Thousands/uL 484*   < > 433*   NEUTROS PCT %  --   --  80*   LYMPHS PCT %  --   --  8*   MONOS PCT %  --   --  10   EOS PCT %  --   --  0    < > = values in this interval not displayed  Results from last 7 days   Lab Units 03/06/21  0441  03/01/21  0445   SODIUM mmol/L 134*   < > 137   POTASSIUM mmol/L 3 8   < > 3 8   CHLORIDE mmol/L 99*   < > 103   CO2 mmol/L 29   < > 29   BUN mg/dL 10   < > 8   CREATININE mg/dL 0 47*   < > 0 42*   ANION GAP mmol/L 6   < > 5   CALCIUM mg/dL 9 0   < > 9 1   ALBUMIN g/dL  --   --  2 0*   TOTAL BILIRUBIN mg/dL  --   --  0 16*   ALK PHOS U/L  --   --  157*   ALT U/L  --   --  <6*   AST U/L  --   --  7   GLUCOSE RANDOM mg/dL 243*   < > 124    < > = values in this interval not displayed  Results from last 7 days   Lab Units 03/04/21  0537   INR  0 96     Results from last 7 days   Lab Units 03/07/21  1105 03/07/21  0633 03/06/21  2105 03/06/21  1608 03/06/21  1119 03/06/21  0637 03/05/21  2101 03/05/21  1620 03/05/21  1159 03/05/21  0851 03/05/21  0613 03/04/21  1616   POC GLUCOSE mg/dl 252* 266* 257* 283* 140 255* 240* 302* 218* 208* 206* 186*     Results from last 7 days   Lab Units 03/01/21  0445   HEMOGLOBIN A1C % >14 0*     Results from last 7 days   Lab Units 03/01/21  0024   PROCALCITONIN ng/ml 0 50*           * I Have Reviewed All Lab Data Listed Above  * Additional Pertinent Lab Tests Reviewed:  All Labs Within Last 24 Hours Reviewed      Recent Cultures (last 7 days):     Results from last 7 days   Lab Units 03/05/21  1705 03/04/21  1259 03/01/21  1100   GRAM STAIN RESULT  3+ Polys  No organisms seen 4+ Polys*  3+ Gram positive cocci in clusters*  --    URINE CULTURE   --   --  70,000-79,000 cfu/ml Staphylococcus aureus*   BODY FLUID CULTURE, STERILE  2+ Growth of Staphylococcus aureus* 4+ Growth of Staphylococcus aureus*  --        Last 24 Hours Medication List:   Current Facility-Administered Medications   Medication Dose Route Frequency Provider Last Rate    acetaminophen  975 mg Oral Q8H Pascual Gary MD      cefazolin  2,000 mg Intravenous Q8H Asim Edmondson MD 2,000 mg (03/07/21 4329)    diphenhydrAMINE  25 mg Oral HS PRN Asim Edmondson MD      gabapentin  300 mg Oral TID Asim Edmondson MD      heparin (porcine)  5,000 Units Subcutaneous [facility] Frank Astudillo MD      HYDROmorphone  1 mg Intravenous Q4H PRN Asim Edmondson MD      insulin glargine  38 Units Subcutaneous HS Colt Del Cid MD      insulin lispro  1-5 Units Subcutaneous TID [facility] Fernanda Onofre MD      insulin lispro  1-5 Units Subcutaneous HS Fernanda Onofre MD      insulin lispro  18 Units Subcutaneous TID With Meals Colt Del Cid MD      nicotine  21 mg Transdermal Daily Asim Edmondson MD      oxyCODONE  10 mg Oral Q4H PRRUPA Edmondson MD      oxyCODONE  5 mg Oral Q4H PRN Asim Edmondson MD      polyethylene glycol  17 g Oral BID AC Asim Edmondson MD          Today, Patient Was Seen By: Nathalie Johnson MD    ** Please Note: Dictation voice to text software may have been used in the creation of this document   **

## 2021-03-08 ENCOUNTER — APPOINTMENT (INPATIENT)
Dept: NON INVASIVE DIAGNOSTICS | Facility: HOSPITAL | Age: 46
DRG: 710 | End: 2021-03-08
Payer: COMMERCIAL

## 2021-03-08 ENCOUNTER — APPOINTMENT (INPATIENT)
Dept: RADIOLOGY | Facility: HOSPITAL | Age: 46
DRG: 710 | End: 2021-03-08
Attending: STUDENT IN AN ORGANIZED HEALTH CARE EDUCATION/TRAINING PROGRAM
Payer: COMMERCIAL

## 2021-03-08 LAB
ANION GAP SERPL CALCULATED.3IONS-SCNC: 4 MMOL/L (ref 4–13)
BACTERIA SPEC BFLD CULT: ABNORMAL
BUN SERPL-MCNC: 16 MG/DL (ref 5–25)
CALCIUM SERPL-MCNC: 9 MG/DL (ref 8.3–10.1)
CHLORIDE SERPL-SCNC: 99 MMOL/L (ref 100–108)
CO2 SERPL-SCNC: 32 MMOL/L (ref 21–32)
CREAT SERPL-MCNC: 0.58 MG/DL (ref 0.6–1.3)
ERYTHROCYTE [DISTWIDTH] IN BLOOD BY AUTOMATED COUNT: 12.1 % (ref 11.6–15.1)
GFR SERPL CREATININE-BSD FRML MDRD: 123 ML/MIN/1.73SQ M
GLUCOSE SERPL-MCNC: 177 MG/DL (ref 65–140)
GLUCOSE SERPL-MCNC: 198 MG/DL (ref 65–140)
GLUCOSE SERPL-MCNC: 229 MG/DL (ref 65–140)
GLUCOSE SERPL-MCNC: 242 MG/DL (ref 65–140)
GLUCOSE SERPL-MCNC: 251 MG/DL (ref 65–140)
GLUCOSE SERPL-MCNC: 287 MG/DL (ref 65–140)
GRAM STN SPEC: ABNORMAL
GRAM STN SPEC: ABNORMAL
HCT VFR BLD AUTO: 33.8 % (ref 36.5–49.3)
HGB BLD-MCNC: 10.9 G/DL (ref 12–17)
HIV 1+2 AB+HIV1 P24 AG SERPL QL IA: NORMAL
INR PPP: 0.9 (ref 0.84–1.19)
MCH RBC QN AUTO: 28.4 PG (ref 26.8–34.3)
MCHC RBC AUTO-ENTMCNC: 32.2 G/DL (ref 31.4–37.4)
MCV RBC AUTO: 88 FL (ref 82–98)
PLATELET # BLD AUTO: 470 THOUSANDS/UL (ref 149–390)
PMV BLD AUTO: 8.2 FL (ref 8.9–12.7)
POTASSIUM SERPL-SCNC: 4.3 MMOL/L (ref 3.5–5.3)
PROTHROMBIN TIME: 12.2 SECONDS (ref 11.6–14.5)
RBC # BLD AUTO: 3.84 MILLION/UL (ref 3.88–5.62)
SODIUM SERPL-SCNC: 135 MMOL/L (ref 136–145)
WBC # BLD AUTO: 14.78 THOUSAND/UL (ref 4.31–10.16)

## 2021-03-08 PROCEDURE — 82948 REAGENT STRIP/BLOOD GLUCOSE: CPT

## 2021-03-08 PROCEDURE — 76080 X-RAY EXAM OF FISTULA: CPT | Performed by: STUDENT IN AN ORGANIZED HEALTH CARE EDUCATION/TRAINING PROGRAM

## 2021-03-08 PROCEDURE — 49424 ASSESS CYST CONTRAST INJECT: CPT | Performed by: STUDENT IN AN ORGANIZED HEALTH CARE EDUCATION/TRAINING PROGRAM

## 2021-03-08 PROCEDURE — 99232 SBSQ HOSP IP/OBS MODERATE 35: CPT | Performed by: INTERNAL MEDICINE

## 2021-03-08 PROCEDURE — 49424 ASSESS CYST CONTRAST INJECT: CPT

## 2021-03-08 PROCEDURE — B246ZZ4 ULTRASONOGRAPHY OF RIGHT AND LEFT HEART, TRANSESOPHAGEAL: ICD-10-PCS | Performed by: FAMILY MEDICINE

## 2021-03-08 PROCEDURE — 76080 X-RAY EXAM OF FISTULA: CPT

## 2021-03-08 PROCEDURE — 93312 ECHO TRANSESOPHAGEAL: CPT | Performed by: INTERNAL MEDICINE

## 2021-03-08 PROCEDURE — 93312 ECHO TRANSESOPHAGEAL: CPT

## 2021-03-08 PROCEDURE — 80048 BASIC METABOLIC PNL TOTAL CA: CPT | Performed by: INTERNAL MEDICINE

## 2021-03-08 PROCEDURE — 85025 COMPLETE CBC W/AUTO DIFF WBC: CPT | Performed by: INTERNAL MEDICINE

## 2021-03-08 PROCEDURE — 85610 PROTHROMBIN TIME: CPT | Performed by: INTERNAL MEDICINE

## 2021-03-08 PROCEDURE — 93325 DOPPLER ECHO COLOR FLOW MAPG: CPT | Performed by: INTERNAL MEDICINE

## 2021-03-08 PROCEDURE — 99024 POSTOP FOLLOW-UP VISIT: CPT | Performed by: PHYSICIAN ASSISTANT

## 2021-03-08 PROCEDURE — 93320 DOPPLER ECHO COMPLETE: CPT | Performed by: INTERNAL MEDICINE

## 2021-03-08 RX ORDER — FENTANYL CITRATE 50 UG/ML
INJECTION, SOLUTION INTRAMUSCULAR; INTRAVENOUS AS NEEDED
Status: DISCONTINUED | OUTPATIENT
Start: 2021-03-08 | End: 2021-03-08

## 2021-03-08 RX ORDER — PROPOFOL 10 MG/ML
INJECTION, EMULSION INTRAVENOUS AS NEEDED
Status: DISCONTINUED | OUTPATIENT
Start: 2021-03-08 | End: 2021-03-08

## 2021-03-08 RX ORDER — PROPOFOL 10 MG/ML
INJECTION, EMULSION INTRAVENOUS CONTINUOUS PRN
Status: DISCONTINUED | OUTPATIENT
Start: 2021-03-08 | End: 2021-03-08

## 2021-03-08 RX ORDER — SODIUM CHLORIDE 9 MG/ML
INJECTION, SOLUTION INTRAVENOUS CONTINUOUS PRN
Status: DISCONTINUED | OUTPATIENT
Start: 2021-03-08 | End: 2021-03-08

## 2021-03-08 RX ORDER — INSULIN GLARGINE 100 [IU]/ML
50 INJECTION, SOLUTION SUBCUTANEOUS
Status: DISCONTINUED | OUTPATIENT
Start: 2021-03-08 | End: 2021-03-09 | Stop reason: HOSPADM

## 2021-03-08 RX ADMIN — GABAPENTIN 300 MG: 300 CAPSULE ORAL at 10:44

## 2021-03-08 RX ADMIN — DIPHENHYDRAMINE HCL 25 MG: 25 TABLET ORAL at 21:19

## 2021-03-08 RX ADMIN — METHYLNALTREXONE BROMIDE 8 MG: 8 INJECTION, SOLUTION SUBCUTANEOUS at 14:51

## 2021-03-08 RX ADMIN — HEPARIN SODIUM 5000 UNITS: 5000 INJECTION INTRAVENOUS; SUBCUTANEOUS at 13:15

## 2021-03-08 RX ADMIN — NICOTINE 21 MG: 21 PATCH, EXTENDED RELEASE TRANSDERMAL at 17:20

## 2021-03-08 RX ADMIN — CEFAZOLIN SODIUM 2000 MG: 2 SOLUTION INTRAVENOUS at 05:06

## 2021-03-08 RX ADMIN — FENTANYL CITRATE 25 MCG: 50 INJECTION INTRAMUSCULAR; INTRAVENOUS at 09:30

## 2021-03-08 RX ADMIN — CEFAZOLIN SODIUM 2000 MG: 2 SOLUTION INTRAVENOUS at 21:15

## 2021-03-08 RX ADMIN — IOHEXOL 2 ML: 350 INJECTION, SOLUTION INTRAVENOUS at 16:12

## 2021-03-08 RX ADMIN — ACETAMINOPHEN 975 MG: 325 TABLET ORAL at 21:19

## 2021-03-08 RX ADMIN — BISACODYL 10 MG: 5 TABLET, COATED ORAL at 12:02

## 2021-03-08 RX ADMIN — GABAPENTIN 300 MG: 300 CAPSULE ORAL at 17:18

## 2021-03-08 RX ADMIN — INSULIN LISPRO 2 UNITS: 100 INJECTION, SOLUTION INTRAVENOUS; SUBCUTANEOUS at 17:20

## 2021-03-08 RX ADMIN — INSULIN LISPRO 2 UNITS: 100 INJECTION, SOLUTION INTRAVENOUS; SUBCUTANEOUS at 21:25

## 2021-03-08 RX ADMIN — INSULIN GLARGINE 50 UNITS: 100 INJECTION, SOLUTION SUBCUTANEOUS at 21:25

## 2021-03-08 RX ADMIN — GABAPENTIN 300 MG: 300 CAPSULE ORAL at 21:19

## 2021-03-08 RX ADMIN — PROPOFOL 100 MCG/KG/MIN: 10 INJECTION, EMULSION INTRAVENOUS at 09:30

## 2021-03-08 RX ADMIN — ACETAMINOPHEN 975 MG: 325 TABLET ORAL at 05:06

## 2021-03-08 RX ADMIN — ACETAMINOPHEN 975 MG: 325 TABLET ORAL at 13:14

## 2021-03-08 RX ADMIN — HEPARIN SODIUM 5000 UNITS: 5000 INJECTION INTRAVENOUS; SUBCUTANEOUS at 21:19

## 2021-03-08 RX ADMIN — POLYETHYLENE GLYCOL 3350 17 G: 17 POWDER, FOR SOLUTION ORAL at 17:18

## 2021-03-08 RX ADMIN — POLYETHYLENE GLYCOL 3350 17 G: 17 POWDER, FOR SOLUTION ORAL at 10:44

## 2021-03-08 RX ADMIN — INSULIN LISPRO 18 UNITS: 100 INJECTION, SOLUTION INTRAVENOUS; SUBCUTANEOUS at 17:21

## 2021-03-08 RX ADMIN — INSULIN LISPRO 2 UNITS: 100 INJECTION, SOLUTION INTRAVENOUS; SUBCUTANEOUS at 12:03

## 2021-03-08 RX ADMIN — INSULIN LISPRO 18 UNITS: 100 INJECTION, SOLUTION INTRAVENOUS; SUBCUTANEOUS at 12:03

## 2021-03-08 RX ADMIN — HEPARIN SODIUM 5000 UNITS: 5000 INJECTION INTRAVENOUS; SUBCUTANEOUS at 05:06

## 2021-03-08 RX ADMIN — PROPOFOL 40 MG: 10 INJECTION, EMULSION INTRAVENOUS at 09:30

## 2021-03-08 RX ADMIN — CEFAZOLIN SODIUM 2000 MG: 2 SOLUTION INTRAVENOUS at 13:15

## 2021-03-08 RX ADMIN — SODIUM CHLORIDE: 0.9 INJECTION, SOLUTION INTRAVENOUS at 09:28

## 2021-03-08 NOTE — PROGRESS NOTES
Progress Note - Brittanie Officer 40 y o  male MRN: [id number]    Unit/Bed#: [address] 522-01 Encounter: [id number]      CC: diabetes f/u    Subjective:   Brittanie Officer is a 40y o  year old male with type 2 diabetes admitted with bilateral pyelonephritis, perinephric abscess and prostate abscess, status post drainage  Feels well  No complaints  No hypoglycemia  Objective:     Vitals: Blood pressure 143/97, pulse 81, temperature 98 1 °F (36 7 °C), temperature source Oral, resp  rate 18, height 5' 5" (1 651 m), weight 52 2 kg (115 lb), SpO2 97 %  ,Body mass index is 19 14 kg/m²  Intake/Output Summary (Last 24 hours) at 3/8/2021 1000  Last data filed at 3/8/2021 0949  Gross per 24 hour   Intake 1520 ml   Output 4920 ml   Net -3400 ml       Physical Exam:  General Appearance: awake, appears stated age and cooperative  Head: Normocephalic, without obvious abnormality, atraumatic  Extremities: moves all extremities  Skin: Skin color and temperature normal    Pulm: no labored breathing    Lab, Imaging and other studies: I have personally reviewed pertinent reports  POC Glucose (mg/dl)   Date Value   03/08/2021 229 (H)   03/07/2021 196 (H)   03/07/2021 249 (H)   03/07/2021 252 (H)   03/07/2021 266 (H)   03/06/2021 257 (H)   03/06/2021 283 (H)   03/06/2021 140   03/06/2021 255 (H)   03/05/2021 240 (H)       Assessment/ Plan:    -Type 2 diabetes uncontrolled with A1c > 14%, goal less than 7%  Home regiment consist of Basaglar 10 units q h s  And metformin, but there is a concern about noncompliance  Insulin requirement have been increasing likely due to the infection and receiving cefazolin in Dextrose  Currently on Lantus 38 units q h s  And Humalog 18 units with meals, correctional insulin algorithm 2 with meals and 1 at bedtime  Blood glucose remain above target goal   Will increase Lantus to 50 units q h s  And continue lispro 18 units with meals    His correction insulin as needed and monitor blood glucose for further adjustment  Patient will need to be discharged on insulin; he will require insulin teaching  Can be discharged on above regimen  For discharge, if Lantus solostar is not the preferred basal insulin for the patient's insurance company, please substitute with [place] flexpen, Basaglar Kwikpen , Levemir flexpen, Toujeo solostar pen  or equivalent insulin vials at the same dose instead  For discharge, if Humalog  Kwik pen is not the preferred mealtime insulin for the patient's insurance, please substitute with NovoLog flexpen, Fiasp  flextouch, Admelog solostar  or Apidra solostar pen or equivalent insulin vials at the same dose instead  If basal/bolus therapy is not covered or affordable, please substitute with Novolin 70/30 Flexpen OR Novolin 70/30 vial at equivalent dose     Please prescribe insulin pen needles, glucometer, test strips, lancets and insulin syringes (only when using insulin vials) on discharge          -Pyelonephritis:  On antibiotics per primary team     -Perinephric abscess and prostate abscess:  Management per primary team   Patient is scheduled for a HERMANN today  Portions of the record may have been created with voice recognition software

## 2021-03-08 NOTE — PROGRESS NOTES
Progress Note - Harper Tho 1975, 40 y o  male MRN: [id number]    Unit/Bed#: [facility] 522-01 Encounter: [id number]    Primary Care Provider: Chayo Oconnell DO   Date and time admitted to hospital: 2/28/2021 11:44 AM        * Sepsis ([address] )  Assessment & Plan  · POA, secondary MSSA abscesses found in prostate, kidney and right scapular area  · Patient presented with fever, tachycardia, leukocytosis  · Cultures positive for MSSA, continue  IV cefazolin  · HERMANN is negative for endocarditis  · HIV test pending  · Possible transition to PO ABx when cleared by ID    Acute pain of right shoulder  Assessment & Plan  · Localized to right suprascapular area, found to have an age indetermine fracture of greater tuberosity and 5cm fluid collection of scapular area  · Reports having a fall in December while walking his dog injuring his shoulder and sustaining a laceration of the right hand from the leash  · Continue cefazolin  · Weight bearing as tolerated per ortho  · S/p IR drainage of 5cc purulent fluid, growing MSSA  · improved    Prostate abscess  Assessment & Plan  S/p cysto and drainage  Urine culture with MSSA  Possibly due to septic emboli    Pyelonephritis  Assessment & Plan  · Patient with underlying poorly-controlled diabetes, poor insulin compliance, presented with bilateral back pain, fatigue, dysuria, and hematuria  · CT scan with Bilateral pyelonephritis with perinephric abscess and prostatic abscesses  · Status post cystoscopy with drainage of prostatic abscess    · S/p IR drainage of renal abscess  · Urine cultures positive for MSSA, surgical culture from renal abscess is growing GPCs in clusters  · Monitor drain output,   · Check repeat CT abdomen with resolution of renal abscess  · Renal drain removed today    Type 2 diabetes mellitus with hyperglycemia, with long-term current use of insulin [facility])  Assessment & Plan  Lab Results   Component Value Date    HGBA1C >14 0 (H) 03/01/2021       Recent Labs 21  2109 21  0632 21  1109 21  1641   POCGLU 196* 229* 177* 198*       Blood Sugar Average: Last 72 hrs:  (P) 229 75   · Poorly-controlled  · Cpeptide is low, likely pancreatic failure  · History of noncompliance due to depression  · Endocrinology following, adjusting basal bolus insulin        VTE Pharmacologic Prophylaxis:   Pharmacologic: Heparin  Mechanical VTE Prophylaxis in Place: Yes    Patient Centered Rounds: I have performed bedside rounds with nursing staff today  Discussions with Specialists or Other Care Team Provider: ID plan of care    Education and Discussions with Family / Patient: patient, plan of care    Time Spent for Care: 20 minutes  More than 50% of total time spent on counseling and coordination of care as described above  Current Length of Stay: 8 day(s)    Current Patient Status: Inpatient   Certification Statement: The patient will continue to require additional inpatient hospital stay due to monitor for stability post removal of renal abscess drain    Discharge Plan: home in 24h    Code Status: Level 1 - Full Code      Subjective:   Reports that his shoulder pain is improved, no new complaints  Continues to have constipation  Objective:     Vitals:   Temp (24hrs), Av 1 °F (36 7 °C), Min:97 9 °F (36 6 °C), Max:98 3 °F (36 8 °C)    Temp:  [97 9 °F (36 6 °C)-98 3 °F (36 8 °C)] 97 9 °F (36 6 °C)  HR:  [76-84] 76  Resp:  [17-18] 18  BP: (126-143)/(77-97) 138/81  SpO2:  [95 %-98 %] 98 %  Body mass index is 19 14 kg/m²  Input and Output Summary (last 24 hours): Intake/Output Summary (Last 24 hours) at 3/8/2021 1715  Last data filed at 3/8/2021 1301  Gross per 24 hour   Intake 1280 ml   Output 3420 ml   Net -2140 ml       Physical Exam:     Physical Exam  Constitutional:       Appearance: Normal appearance  HENT:      Head: Normocephalic and atraumatic        Nose: Nose normal       Mouth/Throat:      Mouth: Mucous membranes are moist  Pharynx: Oropharynx is clear  Eyes:      Extraocular Movements: Extraocular movements intact  Cardiovascular:      Rate and Rhythm: Normal rate and regular rhythm  Pulmonary:      Effort: Pulmonary effort is normal       Breath sounds: No wheezing or rales  Neurological:      General: No focal deficit present  Mental Status: He is alert and oriented to person, place, and time  Additional Data:     Labs:    Results from last 7 days   Lab Units 03/08/21  0512  03/02/21  0308   WBC Thousand/uL 14 78*   < > 22 81*   HEMOGLOBIN g/dL 10 9*   < > 11 1*   HEMATOCRIT % 33 8*   < > 33 3*   PLATELETS Thousands/uL 470*   < > 433*   NEUTROS PCT %  --   --  80*   LYMPHS PCT %  --   --  8*   MONOS PCT %  --   --  10   EOS PCT %  --   --  0    < > = values in this interval not displayed  Results from last 7 days   Lab Units 03/08/21  0512   SODIUM mmol/L 135*   POTASSIUM mmol/L 4 3   CHLORIDE mmol/L 99*   CO2 mmol/L 32   BUN mg/dL 16   CREATININE mg/dL 0 58*   ANION GAP mmol/L 4   CALCIUM mg/dL 9 0   GLUCOSE RANDOM mg/dL 251*     Results from last 7 days   Lab Units 03/08/21  0512   INR  0 90     Results from last 7 days   Lab Units 03/08/21  1641 03/08/21  1109 03/08/21  0632 03/07/21  2109 03/07/21  1617 03/07/21  1105 03/07/21  0633 03/06/21  2105 03/06/21  1608 03/06/21  1119 03/06/21  0637 03/05/21  2101   POC GLUCOSE mg/dl 198* 177* 229* 196* 249* 252* 266* 257* 283* 140 255* 240*                   * I Have Reviewed All Lab Data Listed Above  * Additional Pertinent Lab Tests Reviewed:  All Labs Within Last 24 Hours Reviewed    Imaging:      Recent Cultures (last 7 days):     Results from last 7 days   Lab Units 03/05/21  1705 03/04/21  1259   GRAM STAIN RESULT  3+ Polys  No organisms seen 4+ Polys*  3+ Gram positive cocci in clusters*   BODY FLUID CULTURE, STERILE  2+ Growth of Staphylococcus aureus* 4+ Growth of Staphylococcus aureus*       Last 24 Hours Medication List:   Current Facility-Administered Medications   Medication Dose Route Frequency Provider Last Rate    acetaminophen  975 mg Oral Q8H [facility] [place] Ronda Blackmon MD      cefazolin  2,000 mg Intravenous Q8H Shannon Raya MD 2,000 mg (03/08/21 1315)    diphenhydrAMINE  25 mg Oral HS PRN Shannon Raya MD      gabapentin  300 mg Oral TID Shannon Raya MD      heparin (porcine)  5,000 Units Subcutaneous [facility] Ronda Blackmon MD      HYDROmorphone  1 mg Intravenous Q4H PRN Shannon Raya MD      insulin glargine  50 Units Subcutaneous HS Maribeth Moon MD      insulin lispro  1-5 Units Subcutaneous HS Maribeth Moon MD      insulin lispro  18 Units Subcutaneous TID With Meals Maribeth Moon MD      insulin lispro  2-12 Units Subcutaneous TID AC Maribeth Moon MD      nicotine  21 mg Transdermal Daily Shannon Raya MD      oxyCODONE  10 mg Oral Q4H PRN Shannon Raya MD      oxyCODONE  5 mg Oral Q4H PRN Shannon Raya MD      polyethylene glycol  17 g Oral BID AC Shannon Raya MD          Today, Patient Was Seen By: Ramon Lind MD    ** Please Note: Dictation voice to text software may have been used in the creation of this document   **

## 2021-03-08 NOTE — CONSULTS
INTERPROFESSIONAL (PHONE) CONSULTATION - Interventional Radiology  Celio Patterson 40 y o  male MRN: [id number]  Unit/Bed#: [facility] 522-01 Encounter: [id number]    IR has been consulted to evaluate the patient, determine the appropriate procedure, and whether or not a procedure can and should be performed regarding the care of Celio Patterson  We were consulted by urology concerning this patient, and to possibly perform a tube check if medically appropriate for the patient  Consults  03/07/21      Assessment/Recommendation:   40 yoM PMH L perinephric abscess, s/p catheter insertion last week  Output has remained low since initial removal of 15 mls pus  CT shows resolution of cavity  IR consulted for tube check with probable removal     We'll plan on a tube check tomorrow  He does not need to be NPO for this  Total time spent in review of data, discussion with requesting provider and rendering advice was 15 minutes  Patient or appropriate family member was verbally informed by urology of this consultative service on their behalf to provide more timely access to specialty care in lieu of an in person consultation  Verbal consent was obtained  Thank you for allowing Interventional Radiology to participate in the care of Celio Patterson  Please don't hesitate to call or TigerText us with any questions       Aileen Galeas MD

## 2021-03-08 NOTE — PROGRESS NOTES
Progress Note - Infectious Disease   Zachary Piper 40 y o  male MRN: [id number]  Unit/Bed#: [facility] 522-01 Encounter: [id number]      Impression/Recommendations:  1  Sepsis, POA, presented with leukocytosis and tachycardia   Source of sepsis is most likely the multiple MSSA abscesses   Patient is clinically improved   WBC decreasing, although still elevated   HR  has normalized   He has remained hemodynamically stable, without hypotension   Admission blood cultures have no growth  Antibiotic plan as in below  Monitor temperature/WBC  Monitor hemodynamics      2  Multifocal MSSA abscesses, involving prostate, left kidney and probable right shoulder   Patient is status post drainage of prostate abscess and left renal abscess, with growth of MSSA   Staph aureus is not a common pathogen in UTI   Right shoulder abscess is now also drained, with culture also having growth of MSSA   Given multifocal abscesses, I am concerned the patient was bacteremic at some point of time in the past, although blood cultures on admission have no growth   He may have endocarditis  However, with completely normal HERMANN, endocarditis is not likely     Continue high-dose IV cefazolin  Likely transition to p o  Keflex in am, if patient continues to do well clinically  Treat x 10 days from last drainage      3  Recent right hand laceration   This occurred back in December 2020, on a dog leash   Laceration appears to have healed   At present, there is no evidence of cellulitis   Per patient description, this laceration appears to have been small   However, given his very poorly controlled DM, there is risk for bacteremia even with small and insignificant loss of skin integrity   Concern for recent bacteremia with secondary endocarditis, as in above    Workup for possible endocarditis, as in above      4  DM, poorly controlled, with hyperglycemia and elevated hemoglobin A1c   This is clear risk factor for multifocal abscesses above   Patient counseled regarding the need to better control his blood sugar to prevent future complications  Management per primary service      Discussed with patient in detail regarding the above plan  Discussed with Dr Dailey from [facility] service      Antibiotics:  Cefazolin  Antibiotic # 8      Subjective:  Patient's right shoulder pain continues to improve  Abdominal and flank pain also improving  Temperature stays down   No chills  He is tolerating antibiotic well   No nausea, vomiting or diarrhea      Objective:  Vitals:  Temp:  [98 1 °F (36 7 °C)-98 4 °F (36 9 °C)] 98 1 °F (36 7 °C)  HR:  [81-91] 81  Resp:  [17-20] 18  BP: (126-143)/(77-97) 143/97  SpO2:  [95 %-97 %] 97 %  Temp (24hrs), Av 3 °F (36 8 °C), Min:98 1 °F (36 7 °C), Max:98 4 °F (36 9 °C)  Current: Temperature: 98 1 °F (36 7 °C)    Physical Exam:     General: Awake, alert, cooperative, no distress  Neck:  Supple  No mass  No lymphadenopathy  Lungs: Expansion symmetric, no rales, no wheezing, respirations unlabored  Heart:  Regular rate and rhythm, S1 and S2 normal, no murmur  Abdomen: Soft, nondistended, very mild lower abdominal tenderness, bowel sounds active all four quadrants, no masses, no organomegaly  Extremities: No edema  No erythema/warmth  No ulcer  Nontender to palpation  Skin:  No rash  Neuro: Moves all extremities  Invasive Devices     Peripheral Intravenous Line            Peripheral IV 21 Right Arm less than 1 day          Drain            Closed/Suction Drain Left Back Bulb 10 Fr  4 days                Labs studies:   I have personally reviewed pertinent labs    Results from last 7 days   Lab Units 21  0454   POTASSIUM mmol/L 4 3 3 8 3 4*   CHLORIDE mmol/L 99* 99* 100   CO2 mmol/L 32 29 31   BUN mg/dL 16 10 6   CREATININE mg/dL 0 58* 0 47* 0 50*   EGFR ml/min/1 73sq m 123 134 131   CALCIUM mg/dL 9 0 9 0 8 9     Results from last 7 days   Lab Units 21  044 03/05/21  0454   WBC Thousand/uL 14 78* 17 75* 18 01*   HEMOGLOBIN g/dL 10 9* 11 0* 10 9*   PLATELETS Thousands/uL 470* 484* 495*     Results from last 7 days   Lab Units 03/05/21  1705 03/04/21  1259   GRAM STAIN RESULT  3+ Polys  No organisms seen 4+ Polys*  3+ Gram positive cocci in clusters*   BODY FLUID CULTURE, STERILE  2+ Growth of Staphylococcus aureus* 4+ Growth of Staphylococcus aureus*       Imaging Studies:   I have personally reviewed pertinent imaging study reports and images in PACS  EKG, Pathology, and Other Studies:   I have personally reviewed pertinent reports

## 2021-03-08 NOTE — DISCHARGE INSTRUCTIONS
Abscess/fluid collection Aspiration      WHAT YOU NEED TO KNOW:     Today you underwent a fluid collection/abscess aspiration  Usually the fluid is sent to the lab for culture  You may have some pain associated with the puncture site  The Procedure is performed with Local anesthesia (Lidocaine) and sometimes with local and IV Sedation  After you go Home:    Home care  These tips can help your wound heal:   The wound may drain for the first 2 days  Cover the wound with a clean dry dressing  Change the dressing if it becomes soaked with blood or pus   If a gauze packing was placed inside the abscess pocket, you may be told to remove it yourself  You may do this in the shower  Once the packing is removed, you should wash the area in the shower, or clean the area as directed by your provider  Continue to do this until the skin opening has closed  Make sure you wash your hands after changing the packing or cleaning the wound   If you were prescribed antibiotics, take them as directed until they are all gone   You may use acetaminophen or ibuprofen to control pain, unless another pain medicine was prescribed  If you have liver disease or ever had a stomach ulcer, talk with your doctor before using these medicines  Follow-up care  Follow up with your healthcare provider, or as advised  If a gauze packing was put in your wound, it should be removed in 1 to 2 days  Check your wound every day for any signs that the infection is getting worse  The signs are listed below    When to seek medical advice  Call your healthcare provider right away if any of these occur:   Increasing redness or swelling   Red streaks in the skin leading away from the wound   Increasing local pain or swelling   Continued pus draining from the wound 2 days after treatment   Fever of 100 4ºF (38ºC) or higher, or as directed by your healthcare provider  Abscess  returns when you are at [facility]   [address]:   A warm compress may help your abscess drain  Your healthcare provider may make a cut in the abscess so it can drain  You may need surgery to remove an abscess that is on your hands or buttocks  DISCHARGE INSTRUCTIONS:   Return to the emergency department if:   · The area around your abscess becomes very painful, warm, or has red streaks  · You have a fever and chills  · Your heart is beating faster than usual      · You feel faint or confused  Contact your healthcare provider if:   · Your abscess gets bigger or does not get better  · Your abscess returns  · You have questions or concerns about your condition or care  Medicines: You may  need any of the following:  · Antibiotics  help treat a bacterial infection  · Acetaminophen  decreases pain and fever  It is available without a doctor's order  Ask how much to take and how often to take it  Follow directions  Acetaminophen can cause liver damage if not taken correctly  · NSAIDs , such as ibuprofen, help decrease swelling, pain, and fever  This medicine is available with or without a doctor's order  NSAIDs can cause stomach bleeding or kidney problems in certain people  If you take blood thinner medicine, always ask your healthcare provider if NSAIDs are safe for you  Always read the medicine label and follow directions  · Take your medicine as directed  Contact your healthcare provider if you think your medicine is not helping or if you have side effects  Tell him or her if you are allergic to any medicine  Keep a list of the medicines, vitamins, and herbs you take  Include the amounts, and when and why you take them  Bring the list or the pill bottles to follow-up visits  Carry your medicine list with you in case of an emergency  Self-care:   · Apply a warm compress to your abscess    This will help it open and drain  Wet a washcloth in warm, but not hot, water  Apply the compress for 10 minutes  Repeat this 4 times each day  Do not  press on an abscess or try to open it with a needle  You may push the bacteria deeper or into your blood  · Do not share your clothes, towels, or sheets with anyone  This can spread the infection to others  · Wash your hands often  This can help prevent the spread of germs  Use soap and water or an alcohol-based hand rub  Care for your wound after it is drained:   · Care for your wound as directed  If your healthcare provider says it is okay, carefully remove the bandage and gauze packing  You may need to soak the gauze to get it out of your wound  Clean your wound and the area around it as directed  Dry the area and put on new, clean bandages  Change your bandages when they get wet or dirty  · Ask your healthcare provider how to change the gauze in your wound  Keep track of how many pieces of gauze are placed inside the wound  Do not put too much packing in the wound  Do not pack the gauze too tightly in your wound  Follow up with your healthcare provider in 1 to 3 days: You may need to have your packing removed or your bandage changed  Write down your questions so you remember to ask them during your visits  © Copyright [address] Information is for End User's use only and may not be sold, redistributed or otherwise used for commercial purposes  All illustrations and images included in CareNotes® are the copyrighted property of A D A M [employer]  or [address] [place] [address]  The above information is an  only  It is not intended as medical advice for individual conditions or treatments  Talk to your doctor, nurse or pharmacist before following any medical regimen to see if it is safe and effective for you                                                                                Drainage Tube Removal    Your drainage tube was removed today     What you need know at home:   Keep a clean dry dressing at the tube site until the small opening closes  It will take twenty four to forty eight hours  Keep the site dry until it heals  A small amount of drainage on your dressing is normal  Resume your normal diet  Small sips of flat soda will help with any nausea  Contact Interventional Radiology for any of the following: You have pain, fever greater than 101, shaking chills  If you have increased redness or swelling at the site  I the drainage from your site does not stop  If the site drains pus or has a bad odor       Contact Interventional Radiology at [phone]   [facility] PATIENTS: Contact Interventional Radiology at [phone]) Kianna Newton PATIENTS: Contact Interventional Radiology at [phone]) if:

## 2021-03-08 NOTE — ASSESSMENT & PLAN NOTE
· POA, secondary MSSA abscesses found in prostate, kidney and right scapular area  · Patient presented with fever, tachycardia, leukocytosis  · Cultures positive for MSSA, continue  IV cefazolin  · HERMANN is negative for endocarditis  · HIV test pending  · Possible transition to PO ABx when cleared by ID

## 2021-03-08 NOTE — ANESTHESIA PREPROCEDURE EVALUATION
Procedure:  HERMANN    Relevant Problems   ENDO   (+) Type 2 diabetes mellitus with hyperglycemia, with long-term current use of insulin (HCC)      /RENAL   (+) Prostate abscess      Other   (+) Acute pain of right shoulder   (+) Pyelonephritis   (+) Sepsis (HCC)   (+) Tobacco abuse      Recent labs personally reviewed:  Lab Results   Component Value Date    WBC 14 78 (H) 2021    HGB 10 9 (L) 2021     (H) 2021     Lab Results   Component Value Date    K 4 3 2021    BUN 16 2021    CREATININE 0 58 (L) 2021     Lab Results   Component Value Date    PTT 32 2021      Lab Results   Component Value Date    INR 0 90 2021     Lab Results   Component Value Date    HGBA1C >14 0 (H) 2021     Transthoracic Echocardiogram  2D, M-mode, Doppler, and Color Doppler     Study date:  06-Mar-2021     Patient: David Peralta  MR number: [id number]  Account number: [de-identified]  : 1975  Age: 40 years  Gender: Male  Status: Inpatient  Location: Bedside  Height: 65 in  Weight: 115 lb  BP: 142/ 82 mmHg     Indications: Bacteremia     Diagnoses: R78 81 - Bacteremia     Sonographer:  Meg Gregorio RDCS  Referring Physician: Anibal Huggins MD  Group:  [facility]  Interpreting Physician:  Elba Rollins MD     SUMMARY     LEFT VENTRICLE:  Systolic function was normal  Ejection fraction was estimated to be 60 %  There were no regional wall motion abnormalities      RIGHT VENTRICLE:  The size was normal   Systolic function was normal      HISTORY: PRIOR HISTORY: Sepsis, DM, Tobacco     PROCEDURE: The procedure was performed at the bedside  This was a routine study  The transthoracic approach was used  The study included complete 2D imaging, M-mode, complete spectral Doppler, and color Doppler  The heart rate was 87 bpm,  at the start of the study  Images were obtained from the parasternal, apical, subcostal, and suprasternal notch acoustic windows   Image quality was adequate      LEFT VENTRICLE: Size was normal  Systolic function was normal  Ejection fraction was estimated to be 60 %  There were no regional wall motion abnormalities  Wall thickness was normal  DOPPLER: The transmitral flow pattern was normal  Left  ventricular diastolic function parameters were normal      RIGHT VENTRICLE: The size was normal  Systolic function was normal  Wall thickness was normal      LEFT ATRIUM: Size was normal      RIGHT ATRIUM: Size was normal      MITRAL VALVE: Valve structure was normal  There was normal leaflet separation  DOPPLER: The transmitral velocity was within the normal range  There was no evidence for stenosis  There was trace regurgitation      AORTIC VALVE: The valve was trileaflet  Leaflets exhibited normal thickness and normal cuspal separation  DOPPLER: Transaortic velocity was within the normal range  There was no evidence for stenosis  There was no significant  regurgitation      TRICUSPID VALVE: The valve structure was normal  There was normal leaflet separation  DOPPLER: The transtricuspid velocity was within the normal range  There was no evidence for stenosis  There was trace regurgitation      PULMONIC VALVE: Leaflets exhibited normal thickness, no calcification, and normal cuspal separation  DOPPLER: The transpulmonic velocity was within the normal range  There was trace regurgitation      PERICARDIUM: There was no pericardial effusion  The pericardium was normal in appearance      AORTA: The root exhibited normal size      SYSTEMIC VEINS: IVC: The inferior vena cava was normal in size  Physical Exam    Airway    Mallampati score: II  TM Distance: >3 FB  Neck ROM: full     Dental   upper dentures and lower dentures,     Cardiovascular  Rhythm: regular, Rate: normal,     Pulmonary  Breath sounds clear to auscultation,     Other Findings        Anesthesia Plan  ASA Score- 4     Anesthesia Type- IV sedation with anesthesia with ASA Monitors  Additional Monitors:   Airway Plan:     Comment: Plan for IV Sedation with GA backup  All risks/benefits and alternatives were discussed with the patient including the possibility of escalation of care in terms of anesthesia to GA and the possibility of rare anesthetic and surgical emergencies  Patient agreed and had no further questions prior to procedure  Malika Osborne MD, have personally seen and evaluated the patient prior to anesthetic care  I have reviewed the pre-anesthetic record, and other medical records if appropriate to the anesthetic care  If a CRNA is involved in the case, I have reviewed the CRNA assessment, if present, and agree          Plan Factors-Exercise tolerance (METS): >4 METS  Chart reviewed  EKG reviewed  Existing labs reviewed  Patient summary reviewed  Patient is a current smoker  Induction- intravenous  Postoperative Plan-     Informed Consent- Anesthetic plan and risks discussed with patient  I personally reviewed this patient with the CRNA  Discussed and agreed on the Anesthesia Plan with the CRNA  Randy Lucas

## 2021-03-08 NOTE — ASSESSMENT & PLAN NOTE
· Patient with underlying poorly-controlled diabetes, poor insulin compliance, presented with bilateral back pain, fatigue, dysuria, and hematuria  · CT scan with Bilateral pyelonephritis with perinephric abscess and prostatic abscesses  · Status post cystoscopy with drainage of prostatic abscess    · S/p IR drainage of renal abscess  · Urine cultures positive for MSSA, surgical culture from renal abscess is growing GPCs in clusters  · Monitor drain output,   · Check repeat CT abdomen with resolution of renal abscess  · Renal drain removed today

## 2021-03-08 NOTE — PROGRESS NOTES
UROLOGY PROGRESS NOTE   Patient Identifiers: Meredith Nettles (MRN [id number])  Date of Service: 3/8/2021    Subjective:    Awake and alert  No troubles voiding since catheter was removed Friday  Afebrile  Creatinine 0 58  WBC 14 78  Patient has  no complaints        Objective:     VITALS:    Vitals:    03/08/21 0709   BP: 143/97   Pulse: 81   Resp: 18   Temp: 98 1 °F (36 7 °C)   SpO2: 97%           LABS:  Lab Results   Component Value Date    HGB 10 9 (L) 03/08/2021    HCT 33 8 (L) 03/08/2021    WBC 14 78 (H) 03/08/2021     (H) 03/08/2021   ]    Lab Results   Component Value Date    K 4 3 03/08/2021    CL 99 (L) 03/08/2021    CO2 32 03/08/2021    BUN 16 03/08/2021    CREATININE 0 58 (L) 03/08/2021    CALCIUM 9 0 03/08/2021   ]        INPATIENT MEDS:    Current Facility-Administered Medications:     acetaminophen (TYLENOL) tablet 975 mg, 975 mg, Oral, Q8H [facility], Lopez Pearce MD, 975 mg at 03/08/21 1314    ceFAZolin (ANCEF) IVPB (premix in dextrose) 2,000 mg 50 mL, 2,000 mg, Intravenous, Q8H, Carmen Valdez MD, Last Rate: 100 mL/hr at 03/08/21 1315, 2,000 mg at 03/08/21 1315    diphenhydrAMINE (BENADRYL) tablet 25 mg, 25 mg, Oral, HS PRN, Carmen Valdez MD, 25 mg at 03/07/21 2111    gabapentin (NEURONTIN) capsule 300 mg, 300 mg, Oral, TID, Carmen Valdez MD, 300 mg at 03/08/21 1044    heparin (porcine) subcutaneous injection 5,000 Units, 5,000 Units, Subcutaneous, Q8H [facility], 5,000 Units at 03/08/21 1315 **AND** [COMPLETED] Platelet count, , , Once, Diana Gomez DO    HYDROmorphone (DILAUDID) injection 1 mg, 1 mg, Intravenous, Q4H PRN, Carmen Valdez MD    insulin glargine (LANTUS) subcutaneous injection 38 Units 0 38 mL, 38 Units, Subcutaneous, HS, Yarelis Michel MD, 38 Units at 03/07/21 2110    insulin lispro (HumaLOG) 100 units/mL subcutaneous injection 1-5 Units, 1-5 Units, Subcutaneous, HS, Yarelis Michel MD    insulin lispro (HumaLOG) 100 units/mL subcutaneous injection 18 Units, 18 Units, Subcutaneous, TID With Meals, Lina Pino MD, 18 Units at 03/08/21 1203    insulin lispro (HumaLOG) 100 units/mL subcutaneous injection 2-12 Units, 2-12 Units, Subcutaneous, TID AC, 2 Units at 03/08/21 1203 **AND** Fingerstick Glucose (POCT), , , TID AC, Lina Pino MD    methylnaltrexone (RELISTOR) subcutaneous injection 8 mg, 8 mg, Subcutaneous, Once, Binta Cutler MD    nicotine (NICODERM CQ) 21 mg/24 hr TD 24 hr patch 21 mg, 21 mg, Transdermal, Daily, Binta Cutler MD, 21 mg at 03/07/21 1706    oxyCODONE (ROXICODONE) immediate release tablet 10 mg, 10 mg, Oral, Q4H PRN, Binta Cutler MD, 10 mg at 03/07/21 1717    oxyCODONE (ROXICODONE) IR tablet 5 mg, 5 mg, Oral, Q4H PRN, Binta Cutler MD, 5 mg at 03/07/21 0522    polyethylene glycol (MIRALAX) packet 17 g, 17 g, Oral, BID AC, Binta Cutler MD, 17 g at 03/08/21 1044      Physical Exam:   /97 (BP Location: Left arm)   Pulse 81   Temp 98 1 °F (36 7 °C) (Oral)   Resp 18   Ht 5' 5" (1 651 m)   Wt 52 2 kg (115 lb)   SpO2 97%   BMI 19 14 kg/m²   GEN: no acute distress    RESP: breathing comfortably with no accessory muscle use    ABD: soft, non-tender, non-distended   INCISION:    EXT: no significant peripheral edema     RADIOLOGY:    CT - ABDOMEN WITHOUT AND WITH IV CONTRAST   IMPRESSION:     Nearly resolved collection at the inferior pole of the left kidney  Minimal stranding remains  No collection is seen elsewhere  Small effusions right greater than left without significant change  Gastric distention with debris should be correlated with any history of vomiting  Assessment:   1  Prostate abscess status post TURP  2  Left renal abscess status post IR drainage  3   Poorly controlled diabetes    Plan:   -Matthews catheter has been removed and he is voiding without difficulty  -Interventional Radiology to remove the kidney drain today  -  -

## 2021-03-08 NOTE — ANESTHESIA POSTPROCEDURE EVALUATION
Post-Op Assessment Note    CV Status:  Stable  Pain Score: 0    Pain management: adequate     Mental Status:  Alert and awake   PONV Controlled:  None   Airway Patency:  Patent   Two or more mitigation strategies used for obstructive sleep apnea   Post Op Vitals Reviewed: Yes      Staff: CRNA         No complications documented      BP   128/87   Temp      Pulse  82   Resp   12   SpO2   99%

## 2021-03-08 NOTE — ASSESSMENT & PLAN NOTE
Lab Results   Component Value Date    HGBA1C >14 0 (H) 03/01/2021       Recent Labs     03/07/21  2109 03/08/21  0632 03/08/21  1109 03/08/21  1641   POCGLU 196* 229* 177* 198*       Blood Sugar Average: Last 72 hrs:  (P) 229 75   · Poorly-controlled  · Cpeptide is low, likely pancreatic failure  · History of noncompliance due to depression  · Endocrinology following, adjusting basal bolus insulin

## 2021-03-08 NOTE — ASSESSMENT & PLAN NOTE
· Localized to right suprascapular area, found to have an age indetermine fracture of greater tuberosity and 5cm fluid collection of scapular area  · Reports having a fall in December while walking his dog injuring his shoulder and sustaining a laceration of the right hand from the leash     · Continue cefazolin  · Weight bearing as tolerated per ortho  · S/p IR drainage of 5cc purulent fluid, growing MSSA  · improved

## 2021-03-08 NOTE — PLAN OF CARE
Problem: Potential for Falls  Goal: Patient will remain free of falls  Description: INTERVENTIONS:  - Assess patient frequently for physical needs  -  Identify cognitive and physical deficits and behaviors that affect risk of falls  -  [place] fall precautions as indicated by assessment   - Educate patient/family on patient safety including physical limitations  - Instruct patient to call for assistance with activity based on assessment  - Modify environment to reduce risk of injury  - Consider OT/PT consult to assist with strengthening/mobility  3/8/2021 0015 by Joanna Weems  Outcome: Progressing  3/8/2021 0014 by Joanna Weems  Outcome: Progressing     Problem: Prexisting or High Potential for Compromised Skin Integrity  Goal: Skin integrity is maintained or improved  Description: INTERVENTIONS:  - Identify patients at risk for skin breakdown  - Assess and monitor skin integrity  - Assess and monitor nutrition and hydration status  - Monitor labs   - Assess for incontinence   - Turn and reposition patient  - Assist with mobility/ambulation  - Relieve pressure over bony prominences  - Avoid friction and shearing  - Provide appropriate hygiene as needed including keeping skin clean and dry  - Evaluate need for skin moisturizer/barrier cream  - Collaborate with interdisciplinary team   - Patient/family teaching  - Consider wound care consult   3/8/2021 0015 by Joanna Weems  Outcome: Progressing  3/8/2021 0014 by Joanna Weems  Outcome: Progressing     Problem: Nutrition/Hydration-ADULT  Goal: Nutrient/Hydration intake appropriate for improving, restoring or maintaining nutritional needs  Description: Monitor and assess patient's nutrition/hydration status for malnutrition  Collaborate with interdisciplinary team and initiate plan and interventions as ordered  Monitor patient's weight and dietary intake as ordered or per policy   Utilize nutrition screening tool and intervene as necessary  Determine patient's food preferences and provide high-protein, high-caloric foods as appropriate       INTERVENTIONS:  - Monitor oral intake, urinary output, labs, and treatment plans  - Assess nutrition and hydration status and recommend course of action  - Evaluate amount of meals eaten  - Assist patient with eating if necessary   - Allow adequate time for meals  - Recommend/ encourage appropriate diets, oral nutritional supplements, and vitamin/mineral supplements  - Order, calculate, and assess calorie counts as needed  - Recommend, monitor, and adjust tube feedings and TPN/PPN based on assessed needs  - Assess need for intravenous fluids  - Provide specific nutrition/hydration education as appropriate  - Include patient/family/caregiver in decisions related to nutrition  3/8/2021 0015 by Yisel Briggs  Outcome: Progressing  3/8/2021 0014 by Yisel Briggs  Outcome: Progressing     Problem: GENITOURINARY - ADULT  Goal: Maintains or returns to baseline urinary function  Description: INTERVENTIONS:  - Assess urinary function  - Encourage oral fluids to ensure adequate hydration if ordered  - Administer IV fluids as ordered to ensure adequate hydration  - Administer ordered medications as needed  - Offer frequent toileting  - Follow urinary retention protocol if ordered  3/8/2021 0015 by Yisel Briggs  Outcome: Progressing  3/8/2021 0014 by Yisel Briggs  Outcome: Progressing  Goal: Absence of urinary retention  Description: INTERVENTIONS:  - Assess patients ability to void and empty bladder  - Monitor I/O  - Bladder scan as needed  - Discuss with physician/AP medications to alleviate retention as needed  - Discuss catheterization for long term situations as appropriate  3/8/2021 0015 by Yisel Briggs  Outcome: Progressing  3/8/2021 0014 by Gucci Doran  Outcome: Progressing     Problem: METABOLIC, FLUID AND ELECTROLYTES - ADULT  Goal: Electrolytes maintained within normal limits  Description: INTERVENTIONS:  - Monitor labs and assess patient for signs and symptoms of electrolyte imbalances  - Administer electrolyte replacement as ordered  - Monitor response to electrolyte replacements, including repeat lab results as appropriate  - Instruct patient on fluid and nutrition as appropriate  3/8/2021 0015 by Jose Angel Hilton  Outcome: Progressing  3/8/2021 0014 by Jose Angel Hilton  Outcome: Progressing  Goal: Fluid balance maintained  Description: INTERVENTIONS:  - Monitor labs   - Monitor I/O and WT  - Instruct patient on fluid and nutrition as appropriate  - Assess for signs & symptoms of volume excess or deficit  3/8/2021 0015 by Jose Angel Hilton  Outcome: Progressing  3/8/2021 0014 by Jose Angel   Outcome: Progressing  Goal: Glucose maintained within target range  Description: INTERVENTIONS:  - Monitor Blood Glucose as ordered  - Assess for signs and symptoms of hyperglycemia and hypoglycemia  - Administer ordered medications to maintain glucose within target range  - Assess nutritional intake and initiate nutrition service referral as needed  3/8/2021 0015 by Jose Angel Hilton  Outcome: Progressing  3/8/2021 0014 by Jose Angel   Outcome: Progressing     Problem: SKIN/TISSUE INTEGRITY - ADULT  Goal: Skin integrity remains intact  Description: INTERVENTIONS  - Identify patients at risk for skin breakdown  - Assess and monitor skin integrity  - Assess and monitor nutrition and hydration status  - Monitor labs (i e  albumin)  - Assess for incontinence   - Turn and reposition patient  - Assist with mobility/ambulation  - Relieve pressure over bony prominences  - Avoid friction and shearing  - Provide appropriate hygiene as needed including keeping skin clean and dry  - Evaluate need for skin moisturizer/barrier cream  - Collaborate with interdisciplinary team (i e  Nutrition, Rehabilitation, etc )   - Patient/family teaching  3/8/2021 0015 by Jovany Garcia  Outcome: Progressing  3/8/2021 0014 by Jovany Garcia  Outcome: Progressing     Problem: MUSCULOSKELETAL - ADULT  Goal: Maintain or return mobility to safest level of function  Description: INTERVENTIONS:  - Assess patient's ability to carry out ADLs; assess patient's baseline for ADL function and identify physical deficits which impact ability to perform ADLs (bathing, care of mouth/teeth, toileting, grooming, dressing, etc )  - Assess/evaluate cause of self-care deficits   - Assess range of motion  - Assess patient's mobility  - Assess patient's need for assistive devices and provide as appropriate  - Encourage maximum independence but intervene and supervise when necessary  - Involve family in performance of ADLs  - Assess for home care needs following discharge   - Consider OT consult to assist with ADL evaluation and planning for discharge  - Provide patient education as appropriate  3/8/2021 0015 by Jovany Garcia  Outcome: Progressing  3/8/2021 0014 by Jovany Garcia  Outcome: Progressing  Goal: Maintain proper alignment of affected body part  Description: INTERVENTIONS:  - Support, maintain and protect limb and body alignment  - Provide patient/ family with appropriate education  3/8/2021 0015 by Jovany Garcia  Outcome: Progressing  3/8/2021 0014 by Armando Doran  Outcome: Progressing

## 2021-03-08 NOTE — SEDATION DOCUMENTATION
Tube check done with Dr Geovanni Pompa  Per Dr Geovanni Pompa drainage tube removed  Patient tolerated well and to return to room  Brigido Espinosa RN updated

## 2021-03-09 VITALS
HEART RATE: 91 BPM | TEMPERATURE: 97.4 F | SYSTOLIC BLOOD PRESSURE: 146 MMHG | OXYGEN SATURATION: 99 % | RESPIRATION RATE: 20 BRPM | DIASTOLIC BLOOD PRESSURE: 94 MMHG | BODY MASS INDEX: 19.16 KG/M2 | WEIGHT: 115 LBS | HEIGHT: 65 IN

## 2021-03-09 LAB
ANION GAP SERPL CALCULATED.3IONS-SCNC: 6 MMOL/L (ref 4–13)
BUN SERPL-MCNC: 12 MG/DL (ref 5–25)
CALCIUM SERPL-MCNC: 9.2 MG/DL (ref 8.3–10.1)
CHLORIDE SERPL-SCNC: 101 MMOL/L (ref 100–108)
CO2 SERPL-SCNC: 31 MMOL/L (ref 21–32)
CREAT SERPL-MCNC: 0.54 MG/DL (ref 0.6–1.3)
ERYTHROCYTE [DISTWIDTH] IN BLOOD BY AUTOMATED COUNT: 12.1 % (ref 11.6–15.1)
GFR SERPL CREATININE-BSD FRML MDRD: 127 ML/MIN/1.73SQ M
GLUCOSE SERPL-MCNC: 154 MG/DL (ref 65–140)
GLUCOSE SERPL-MCNC: 197 MG/DL (ref 65–140)
GLUCOSE SERPL-MCNC: 247 MG/DL (ref 65–140)
GLUCOSE SERPL-MCNC: 294 MG/DL (ref 65–140)
HCT VFR BLD AUTO: 35.1 % (ref 36.5–49.3)
HGB BLD-MCNC: 11.1 G/DL (ref 12–17)
MCH RBC QN AUTO: 28.2 PG (ref 26.8–34.3)
MCHC RBC AUTO-ENTMCNC: 31.6 G/DL (ref 31.4–37.4)
MCV RBC AUTO: 89 FL (ref 82–98)
NRBC BLD AUTO-RTO: 0 /100 WBCS
PLATELET # BLD AUTO: 575 THOUSANDS/UL (ref 149–390)
PMV BLD AUTO: 8.4 FL (ref 8.9–12.7)
POTASSIUM SERPL-SCNC: 3.9 MMOL/L (ref 3.5–5.3)
PROCALCITONIN SERPL-MCNC: 0.26 NG/ML
RBC # BLD AUTO: 3.93 MILLION/UL (ref 3.88–5.62)
SODIUM SERPL-SCNC: 138 MMOL/L (ref 136–145)
WBC # BLD AUTO: 15.28 THOUSAND/UL (ref 4.31–10.16)

## 2021-03-09 PROCEDURE — 85027 COMPLETE CBC AUTOMATED: CPT | Performed by: INTERNAL MEDICINE

## 2021-03-09 PROCEDURE — 82948 REAGENT STRIP/BLOOD GLUCOSE: CPT

## 2021-03-09 PROCEDURE — 99232 SBSQ HOSP IP/OBS MODERATE 35: CPT | Performed by: INTERNAL MEDICINE

## 2021-03-09 PROCEDURE — 80048 BASIC METABOLIC PNL TOTAL CA: CPT | Performed by: INTERNAL MEDICINE

## 2021-03-09 PROCEDURE — 84145 PROCALCITONIN (PCT): CPT | Performed by: INTERNAL MEDICINE

## 2021-03-09 PROCEDURE — 99239 HOSP IP/OBS DSCHRG MGMT >30: CPT | Performed by: INTERNAL MEDICINE

## 2021-03-09 RX ORDER — POLYETHYLENE GLYCOL 3350 17 G/17G
17 POWDER, FOR SOLUTION ORAL
Qty: 30 G | Refills: 0 | Status: SHIPPED | OUTPATIENT
Start: 2021-03-09 | End: 2021-03-16

## 2021-03-09 RX ORDER — INSULIN GLARGINE 100 [IU]/ML
45 INJECTION, SOLUTION SUBCUTANEOUS
Qty: 10 ML | Refills: 0 | Status: SHIPPED | OUTPATIENT
Start: 2021-03-09 | End: 2021-08-09 | Stop reason: HOSPADM

## 2021-03-09 RX ORDER — GABAPENTIN 300 MG/1
300 CAPSULE ORAL 3 TIMES DAILY
Qty: 90 CAPSULE | Refills: 0 | Status: ON HOLD | OUTPATIENT
Start: 2021-03-09 | End: 2021-08-09 | Stop reason: CLARIF

## 2021-03-09 RX ORDER — CEPHALEXIN 500 MG/1
500 CAPSULE ORAL EVERY 6 HOURS SCHEDULED
Qty: 32 CAPSULE | Refills: 0 | Status: SHIPPED | OUTPATIENT
Start: 2021-03-09 | End: 2021-03-17

## 2021-03-09 RX ORDER — BLOOD-GLUCOSE METER
1 KIT MISCELLANEOUS 4 TIMES DAILY
Qty: 1 EACH | Refills: 0 | Status: SHIPPED | OUTPATIENT
Start: 2021-03-09

## 2021-03-09 RX ORDER — NICOTINE 21 MG/24HR
1 PATCH, TRANSDERMAL 24 HOURS TRANSDERMAL DAILY
Qty: 28 PATCH | Refills: 0 | Status: ON HOLD | OUTPATIENT
Start: 2021-03-09 | End: 2021-08-09 | Stop reason: CLARIF

## 2021-03-09 RX ADMIN — CEFAZOLIN SODIUM 2000 MG: 2 SOLUTION INTRAVENOUS at 13:03

## 2021-03-09 RX ADMIN — INSULIN LISPRO 2 UNITS: 100 INJECTION, SOLUTION INTRAVENOUS; SUBCUTANEOUS at 11:48

## 2021-03-09 RX ADMIN — HEPARIN SODIUM 5000 UNITS: 5000 INJECTION INTRAVENOUS; SUBCUTANEOUS at 15:12

## 2021-03-09 RX ADMIN — ACETAMINOPHEN 975 MG: 325 TABLET ORAL at 05:07

## 2021-03-09 RX ADMIN — INSULIN LISPRO 4 UNITS: 100 INJECTION, SOLUTION INTRAVENOUS; SUBCUTANEOUS at 08:42

## 2021-03-09 RX ADMIN — GABAPENTIN 300 MG: 300 CAPSULE ORAL at 15:12

## 2021-03-09 RX ADMIN — CEFAZOLIN SODIUM 2000 MG: 2 SOLUTION INTRAVENOUS at 05:07

## 2021-03-09 RX ADMIN — POLYETHYLENE GLYCOL 3350 17 G: 17 POWDER, FOR SOLUTION ORAL at 15:14

## 2021-03-09 RX ADMIN — NICOTINE 21 MG: 21 PATCH, EXTENDED RELEASE TRANSDERMAL at 17:27

## 2021-03-09 RX ADMIN — INSULIN LISPRO 18 UNITS: 100 INJECTION, SOLUTION INTRAVENOUS; SUBCUTANEOUS at 11:48

## 2021-03-09 RX ADMIN — POLYETHYLENE GLYCOL 3350 17 G: 17 POWDER, FOR SOLUTION ORAL at 05:09

## 2021-03-09 RX ADMIN — INSULIN LISPRO 18 UNITS: 100 INJECTION, SOLUTION INTRAVENOUS; SUBCUTANEOUS at 08:43

## 2021-03-09 RX ADMIN — ACETAMINOPHEN 975 MG: 325 TABLET ORAL at 13:02

## 2021-03-09 RX ADMIN — GABAPENTIN 300 MG: 300 CAPSULE ORAL at 08:42

## 2021-03-09 RX ADMIN — INSULIN LISPRO 6 UNITS: 100 INJECTION, SOLUTION INTRAVENOUS; SUBCUTANEOUS at 17:28

## 2021-03-09 RX ADMIN — INSULIN LISPRO 18 UNITS: 100 INJECTION, SOLUTION INTRAVENOUS; SUBCUTANEOUS at 17:28

## 2021-03-09 NOTE — DISCHARGE SUMMARY
[facility]  Discharge- Candy Lopez 1975, 40 y o  male MRN: [id number]  Unit/Bed#: [facility] 522-01 Encounter: [id number]  Primary Care Provider: Angel Castro DO   Date and time admitted to hospital: 2/28/2021 11:44 AM    Acute pain of right shoulder  Assessment & Plan  · Localized to right suprascapular area, found to have an age indetermine fracture of greater tuberosity and 5cm fluid collection of scapular area  · Reports having a fall in December while walking his dog injuring his shoulder and sustaining a laceration of the right hand from the leash  · Continue cefazolin  · Weight bearing as tolerated per ortho  · S/p IR drainage of 5cc purulent fluid, growing MSSA  · improved    Tobacco abuse  Assessment & Plan  · Agreeable to nicotine patch at this time    Type 2 diabetes mellitus with hyperglycemia, with long-term current use of insulin [facility])  Assessment & Plan  Lab Results   Component Value Date    HGBA1C >14 0 (H) 03/01/2021       Recent Labs     03/08/21  1641 03/08/21  2125 03/09/21  0621 03/09/21  1054   POCGLU 198* 242* 247* 154*       Blood Sugar Average: Last 72 hrs:  (P) 224 [id number]   · Poorly-controlled  · Cpeptide is low, likely pancreatic failure  · History of noncompliance due to depression  · Endocrinology following, adjusting basal bolus insulin  · Discussed with Endocrinology and will discharge on insulin Lantus 45 units at bedtime and Humalog insulin 15 units 3 times a day  Patient advised to be compliant with low-carbohydrate diet and also insulin regimen      Prostate abscess  Assessment & Plan  S/p cysto and drainage  Urine culture with MSSA  Possibly due to septic emboli    Pyelonephritis  Assessment & Plan  · Patient with underlying poorly-controlled diabetes, poor insulin compliance, presented with bilateral back pain, fatigue, dysuria, and hematuria  · CT scan with Bilateral pyelonephritis with perinephric abscess and prostatic abscesses  · Status post cystoscopy with drainage of prostatic abscess  · S/p IR drainage of renal abscess  · Urine cultures positive for MSSA, surgical culture from renal abscess is growing GPCs in clusters  · Monitor drain output,   · Check repeat CT abdomen with resolution of renal abscess  · Renal drain removed   Urology input highly appreciated  No further follow-up recommended  * Sepsis ([address] )  Assessment & Plan  · POA, secondary MSSA abscesses found in prostate, kidney and right scapular area  · Patient presented with fever, tachycardia, leukocytosis  · Cultures positive for MSSA, continue  IV cefazolin  · HERMANN is negative for endocarditis  · HIV test pending  · Discussed with ID physician and recommend Keflex 500 mg 4 times a day for total 10 days from the last abscess drainage 2 days ago from the shoulder                Resolved Problems  Date Reviewed: 3/1/2021          Resolved    Hyponatremia 3/5/2021     Resolved by  Franklin Eduardo MD          Admission Date:   Admission Orders (From admission, onward)     Ordered        02/28/21 1545  Inpatient Admission  Once                     Admitting Diagnosis: Abscess of prostate [N41 2]  Back pain [M54 9]  DKA (diabetic ketoacidoses) ([address] ) [E11 10]  Pyelonephritis [N12]  Sepsis ([address] ) [A41 9]  Type 2 diabetes mellitus with hyperglycemia, with long-term current use of insulin ([address] ) [E11 65, Z79 4]    HPI: Ti Yo is a 40 y o  male who presents with bilateral back pain and abdominal pain for the past couple of weeks  Patient reports dysuria, urinary frequency weakness,  fatigue and hematuria with clot  He also reported fever last night  No nausea vomiting or diarrhea  Poor oral intake     Patient with underlying history of diabetes on insulin, with poor control and poor compliance with insulin     He was evaluated in the emergency room he found to be febrile with tachycardia and significant leukocytosis and hyperglycemia with blood sugar 547  Hyponatremia  Abdominal CT scan with bilateral pyelonephritis and prostate abscess  Abnormal urinalysis  Urology is planning for cystoscopy tomorrow    Procedures Performed:   Orders Placed This Encounter   Procedures   [address] Course:  Patient was admitted poorly controlled diabetes mellitus with poor compliance to medication and diet and admitted with bilateral back pain and fatigue and dysuria and hematuria  Patient had a CT scan of the abdomen which showed bilateral pyelonephritis with suspected super infected cyst exophytic from the left lower pole measuring 4 9 cm x 2 5 cm  There was small enhancing abscesses in both lobes of the prostate with adjacent cystitis findings consistent with complicated prostatitis with secondary ascending infection  Patient was started on broad-spectrum IV antibiotic urology was consulted and given IV hydration for sepsis with hyperglycemia and hyponatremia  Patient was evaluated by Urology and cystoscopy was done with drainage of prostatic abscess  Patient also underwent  renal abscess drainage by intervention Radiology  Urine culture was positive for MSSA and ID input obtained  Culture from the renal abscesses was also growing MSSA pain in the right shoulder and showed x-ray showed indeterminate fracture of the greater tuberosity and 5 cm fluid collection of the scapular area  Patient had injury to the shoulder sustaining laceration of the right hand from the lesion in December 2020  Patient underwent IR guided drainage of 5 cc per aunt fluid from the right shoulder  Patient also underwent HERMANN which showed no evidence of endocarditis    Plan of care was discussed ID physician and recommended to complete 10 days of antibiotic from the last drainage with Keflex 500 mg q i d   Patient was also evaluated by Endocrinology during the hospitalization and recommended insulin Lantus and lispro with meals and follow up PCP and Endocrinology and ID as outpatient  HEENT-PERRLA, moist oral mucosa  Neck-supple, no JVD elevation   Respiratory-equal air entry bilaterally, no rales or rhonchi  Cardiovascular system-S1, S2 heard, no murmur or gallops or rubs  Abdomen-soft, nontender, no guarding or rigidity, bowel sounds heard  Extremities-no pedal edema  Peripheral pulses palpable  Musculoskeletal-no contractures  Central nervous system-no acute focal neurological deficit ,no sensory or motor deficit noted  Skin-no rash noted        Significant Findings, Care, Treatment and Services Provided: ID and endocrine , urology ,IR , ir guided drainage of multiple abcess     Complications: nil    Condition at Discharge: good         Discharge instructions/Information to patient and family:   See after visit summary for information provided to patient and family  Provisions for Follow-Up Care:  See after visit summary for information related to follow-up care and any pertinent home health orders  PCP: Angel Castro DO    Disposition: Home    Planned Readmission: No    Discharge Statement   I spent 45 minutes discharging the patient  This time was spent on the day of discharge  I had direct contact with the patient on the day of discharge  Additional documentation is required if more than 30 minutes were spent on discharge  Discharge Medications:  See after visit summary for reconciled discharge medications provided to patient and family

## 2021-03-09 NOTE — PROGRESS NOTES
Progress Note - Infectious Disease   Zachary Piper 40 y o  male MRN: [id number]  Unit/Bed#: [facility] 522-01 Encounter: [id number]      Impression/Recommendations:  1  Sepsis, POA, presented with leukocytosis and tachycardia   Source of sepsis is most likely the multiple MSSA abscesses   Patient is clinically improved   WBC decreasing, although still elevated   HR  has normalized   He has remained hemodynamically stable, without hypotension   Admission blood cultures have no growth  Antibiotic plan as in below  Monitor temperature/WBC  Monitor hemodynamics      2  Multifocal MSSA abscesses, involving prostate, left kidney and probable right shoulder   Patient is status post drainage of prostate abscess and left renal abscess, with growth of MSSA   Staph aureus is not a common pathogen in UTI   Right shoulder abscess is now also drained, with culture also having growth of MSSA   Given multifocal abscesses, I am concerned the patient was bacteremic at some point of time in the past, although blood cultures on admission have no growth   Although there was consideration for endocarditis, with completely normal HERMANN, endocarditis is not likely     Continue high-dose IV cefazolin  Transition to p o  Keflex at discharge  Treat x 10 days from last drainage, through 3/15      3  Recent right hand laceration   This occurred back in December 2020, on a dog leash   Laceration appears to have healed        4  DM, poorly controlled, with hyperglycemia and elevated hemoglobin A1c   This is clear risk factor for multifocal abscesses above   Patient counseled regarding the need to better control his blood sugar to prevent future complications  Management per primary service      Discussed with patient in detail regarding the above plan  Discussed with slim service  Okay for discharge from ID viewpoint     Antibiotics:  Cefazolin  Antibiotic # 9      Subjective:  Patient's right shoulder pain continues to improve    Abdominal and flank pain also improving, minimal   Temperature stays down   No chills  He is tolerating antibiotic well   No nausea, vomiting or diarrhea      Objective:  Vitals:  Temp:  [97 4 °F (36 3 °C)-98 1 °F (36 7 °C)] 97 4 °F (36 3 °C)  HR:  [76-91] 91  Resp:  [18-20] 20  BP: (133-167)/(81-98) 133/90  SpO2:  [98 %-99 %] 99 %  Temp (24hrs), Av 8 °F (36 6 °C), Min:97 4 °F (36 3 °C), Max:98 1 °F (36 7 °C)  Current: Temperature: (!) 97 4 °F (36 3 °C)    Physical Exam:     General: Awake, alert, cooperative, no distress  Neck:  Supple  No mass  No lymphadenopathy  Lungs: Expansion symmetric, no rales, no wheezing, respirations unlabored  Heart:  Regular rate and rhythm, S1 and S2 normal, no murmur  Abdomen: Soft, nondistended, minimal left flank tenderness, bowel sounds active all four quadrants, no masses, no organomegaly  Extremities: No edema  No erythema/warmth  No ulcer  Nontender to palpation  Skin:  No rash  Neuro: Moves all extremities  Invasive Devices     Peripheral Intravenous Line            Peripheral IV 21 Right Arm 1 day                Labs studies:   I have personally reviewed pertinent labs    Results from last 7 days   Lab Units 21  0440 21  0512 21  0441   POTASSIUM mmol/L 3 9 4 3 3 8   CHLORIDE mmol/L 101 99* 99*   CO2 mmol/L 31 32 29   BUN mg/dL 12 16 10   CREATININE mg/dL 0 54* 0 58* 0 47*   EGFR ml/min/1 73sq m 127 123 134   CALCIUM mg/dL 9 2 9 0 9 0     Results from last 7 days   Lab Units 21  0440 21  0512 21  0441   WBC Thousand/uL 15 28* 14 78* 17 75*   HEMOGLOBIN g/dL 11 1* 10 9* 11 0*   PLATELETS Thousands/uL 575* 470* 484*     Results from last 7 days   Lab Units 21  1705 21  1259   GRAM STAIN RESULT  3+ Polys  No organisms seen 4+ Polys*  3+ Gram positive cocci in clusters*   BODY FLUID CULTURE, STERILE  2+ Growth of Staphylococcus aureus* 4+ Growth of Staphylococcus aureus*       Imaging Studies:   I have personally reviewed pertinent imaging study reports and images in PACS  EKG, Pathology, and Other Studies:   I have personally reviewed pertinent reports

## 2021-03-09 NOTE — ASSESSMENT & PLAN NOTE
· POA, secondary MSSA abscesses found in prostate, kidney and right scapular area  · Patient presented with fever, tachycardia, leukocytosis  · Cultures positive for MSSA, continue  IV cefazolin  · HERMANN is negative for endocarditis  · HIV test pending  · Discussed with ID physician and recommend Keflex 500 mg 4 times a day for total 10 days from the last abscess drainage 2 days ago from the shoulder

## 2021-03-09 NOTE — PLAN OF CARE
Problem: Potential for Falls  Goal: Patient will remain free of falls  Description: INTERVENTIONS:  - Assess patient frequently for physical needs  -  Identify cognitive and physical deficits and behaviors that affect risk of falls  -  [place] fall precautions as indicated by assessment   - Educate patient/family on patient safety including physical limitations  - Instruct patient to call for assistance with activity based on assessment  - Modify environment to reduce risk of injury  - Consider OT/PT consult to assist with strengthening/mobility  Outcome: Progressing     Problem: Potential for Falls  Goal: Patient will remain free of falls  Description: INTERVENTIONS:  - Assess patient frequently for physical needs  -  Identify cognitive and physical deficits and behaviors that affect risk of falls    -  [place] fall precautions as indicated by assessment   - Educate patient/family on patient safety including physical limitations  - Instruct patient to call for assistance with activity based on assessment  - Modify environment to reduce risk of injury  - Consider OT/PT consult to assist with strengthening/mobility  Outcome: Progressing     Problem: Prexisting or High Potential for Compromised Skin Integrity  Goal: Skin integrity is maintained or improved  Description: INTERVENTIONS:  - Identify patients at risk for skin breakdown  - Assess and monitor skin integrity  - Assess and monitor nutrition and hydration status  - Monitor labs   - Assess for incontinence   - Turn and reposition patient  - Assist with mobility/ambulation  - Relieve pressure over bony prominences  - Avoid friction and shearing  - Provide appropriate hygiene as needed including keeping skin clean and dry  - Evaluate need for skin moisturizer/barrier cream  - Collaborate with interdisciplinary team   - Patient/family teaching  - Consider wound care consult   Outcome: Progressing     Problem: Nutrition/Hydration-ADULT  Goal: Nutrient/Hydration intake appropriate for improving, restoring or maintaining nutritional needs  Description: Monitor and assess patient's nutrition/hydration status for malnutrition  Collaborate with interdisciplinary team and initiate plan and interventions as ordered  Monitor patient's weight and dietary intake as ordered or per policy  Utilize nutrition screening tool and intervene as necessary  Determine patient's food preferences and provide high-protein, high-caloric foods as appropriate       INTERVENTIONS:  - Monitor oral intake, urinary output, labs, and treatment plans  - Assess nutrition and hydration status and recommend course of action  - Evaluate amount of meals eaten  - Assist patient with eating if necessary   - Allow adequate time for meals  - Recommend/ encourage appropriate diets, oral nutritional supplements, and vitamin/mineral supplements  - Order, calculate, and assess calorie counts as needed  - Recommend, monitor, and adjust tube feedings and TPN/PPN based on assessed needs  - Assess need for intravenous fluids  - Provide specific nutrition/hydration education as appropriate  - Include patient/family/caregiver in decisions related to nutrition  Outcome: Progressing     Problem: GENITOURINARY - ADULT  Goal: Maintains or returns to baseline urinary function  Description: INTERVENTIONS:  - Assess urinary function  - Encourage oral fluids to ensure adequate hydration if ordered  - Administer IV fluids as ordered to ensure adequate hydration  - Administer ordered medications as needed  - Offer frequent toileting  - Follow urinary retention protocol if ordered  Outcome: Progressing  Goal: Absence of urinary retention  Description: INTERVENTIONS:  - Assess patients ability to void and empty bladder  - Monitor I/O  - Bladder scan as needed  - Discuss with physician/AP medications to alleviate retention as needed  - Discuss catheterization for long term situations as appropriate  Outcome: Progressing     Problem: METABOLIC, FLUID AND ELECTROLYTES - ADULT  Goal: Electrolytes maintained within normal limits  Description: INTERVENTIONS:  - Monitor labs and assess patient for signs and symptoms of electrolyte imbalances  - Administer electrolyte replacement as ordered  - Monitor response to electrolyte replacements, including repeat lab results as appropriate  - Instruct patient on fluid and nutrition as appropriate  Outcome: Progressing  Goal: Fluid balance maintained  Description: INTERVENTIONS:  - Monitor labs   - Monitor I/O and WT  - Instruct patient on fluid and nutrition as appropriate  - Assess for signs & symptoms of volume excess or deficit  Outcome: Progressing  Goal: Glucose maintained within target range  Description: INTERVENTIONS:  - Monitor Blood Glucose as ordered  - Assess for signs and symptoms of hyperglycemia and hypoglycemia  - Administer ordered medications to maintain glucose within target range  - Assess nutritional intake and initiate nutrition service referral as needed  Outcome: Progressing     Problem: SKIN/TISSUE INTEGRITY - ADULT  Goal: Skin integrity remains intact  Description: INTERVENTIONS  - Identify patients at risk for skin breakdown  - Assess and monitor skin integrity  - Assess and monitor nutrition and hydration status  - Monitor labs (i e  albumin)  - Assess for incontinence   - Turn and reposition patient  - Assist with mobility/ambulation  - Relieve pressure over bony prominences  - Avoid friction and shearing  - Provide appropriate hygiene as needed including keeping skin clean and dry  - Evaluate need for skin moisturizer/barrier cream  - Collaborate with interdisciplinary team (i e  Nutrition, Rehabilitation, etc )   - Patient/family teaching  Outcome: Progressing     Problem: MUSCULOSKELETAL - ADULT  Goal: Maintain or return mobility to safest level of function  Description: INTERVENTIONS:  - Assess patient's ability to carry out ADLs; assess patient's baseline for ADL function and identify physical deficits which impact ability to perform ADLs (bathing, care of mouth/teeth, toileting, grooming, dressing, etc )  - Assess/evaluate cause of self-care deficits   - Assess range of motion  - Assess patient's mobility  - Assess patient's need for assistive devices and provide as appropriate  - Encourage maximum independence but intervene and supervise when necessary  - Involve family in performance of ADLs  - Assess for home care needs following discharge   - Consider OT consult to assist with ADL evaluation and planning for discharge  - Provide patient education as appropriate  Outcome: Progressing  Goal: Maintain proper alignment of affected body part  Description: INTERVENTIONS:  - Support, maintain and protect limb and body alignment  - Provide patient/ family with appropriate education  Outcome: Progressing

## 2021-03-09 NOTE — CASE MANAGEMENT
Cm priced checked medications called to pharmacy , Lancets, glucometer, test strips keflex, Nicotine patches, gabapentin Lantus pens, humalog pens total cost 2$ for 30 day supply  Miralax 60$ from pharmacy and pharmacy staff said its 8$ for a monthly supply OTC  Pt said he is able to afford these medications  Pt said he will get miralax OTC, Dr Sonia English made aware of above  Pts daughter to pick him up at [address] [place] [address]

## 2021-03-09 NOTE — ASSESSMENT & PLAN NOTE
Lab Results   Component Value Date    HGBA1C >14 0 (H) 03/01/2021       Recent Labs     03/08/21  1641 03/08/21  2125 03/09/21  0621 03/09/21  1054   POCGLU 198* 242* 247* 154*       Blood Sugar Average: Last 72 hrs:  (P) 224 [id number]   · Poorly-controlled  · Cpeptide is low, likely pancreatic failure  · History of noncompliance due to depression  · Endocrinology following, adjusting basal bolus insulin  · Discussed with Endocrinology and will discharge on insulin Lantus 45 units at bedtime and Humalog insulin 15 units 3 times a day  Patient advised to be compliant with low-carbohydrate diet and also insulin regimen

## 2021-03-09 NOTE — INCIDENTAL FINDINGS
The following findings require follow up:  Radiographic finding   Finding:CT abdomen pelvis with contrast: 1  Bilateral pyelonephritis with suspected superinfected infected cyst exophytic from the left lower pole measuring 4 9 x 2 5 cm , 2   Small enhancing abscesses in both lobes of the prostate gland with adjacent cystitis, findings consistent with complicated prostatitis with secondary ascending infection  ,  , I personally discussed this study with NELSON METZGER on 2/28/2021 at 2:47 PM ,    Follow up required: with pcp  ID ,urology   Follow up should be done within 2-3 weeks week(s)    Please notify the following clinician to assist with the follow up:   Dr Josiane Jay

## 2021-03-09 NOTE — QUICK NOTE
Incidental finding on CT scan of the abdomen  Heterogeneous cortical enhancement of the bilateral kidneys identified with mild right-sided hydronephrosis concerning for pyelonephritis  Exophytic from the lower pole of the left kidney, a bilobed peripherally enhancing cystic lesion   measures 4 9 x 2 5 cm with inflammatory changes in the adjacent perinephric fat  Superinfected renal cyst suspected  Recommendation-patient needs follow-up with primary care physician and ID physician

## 2021-03-09 NOTE — ASSESSMENT & PLAN NOTE
· Patient with underlying poorly-controlled diabetes, poor insulin compliance, presented with bilateral back pain, fatigue, dysuria, and hematuria  · CT scan with Bilateral pyelonephritis with perinephric abscess and prostatic abscesses  · Status post cystoscopy with drainage of prostatic abscess  · S/p IR drainage of renal abscess  · Urine cultures positive for MSSA, surgical culture from renal abscess is growing GPCs in clusters  · Monitor drain output,   · Check repeat CT abdomen with resolution of renal abscess  · Renal drain removed   Urology input highly appreciated  No further follow-up recommended

## 2021-03-10 NOTE — UTILIZATION REVIEW
Notification of Discharge  This is a Notification of Discharge from our facility [address]  Please be advised that this patient has been discharge from our facility  Below you will find the admission and discharge date and time including the patients disposition  PRESENTATION DATE: 2/28/2021 11:44 AM  OBS ADMISSION DATE:   IP ADMISSION DATE: 2/28/21 1545   DISCHARGE DATE: 3/9/2021  7:24 PM  DISPOSITION: Home/Self Care Home/Self Care   Admission Orders listed below:  Admission Orders (From admission, onward)     Ordered        02/28/21 1545  Inpatient Admission  Once                   Please contact the UR Department if additional information is required to close this patient's authorization/case  [address] Utilization Review Department  Main: [phone] x carefully listen to the prompts  All voicemails are confidential   [email or web address]  Send all requests for admission clinical reviews, approved or denied determinations and any other requests to dedicated fax number below belonging to the campus where the patient is receiving treatment   List of dedicated fax numbers:  [address] DENIALS (Administrative/Medical Necessity) [phone]   [address] (Maternity/NICU/Pediatrics) [phone]   [facility] [phone]   [place] [phone]   Jaden Khan [phone]   [address] [phone]   [facility]  [phone]   [address] [facility] [address] [facility]  [address] [facility] [address] [facility] [phone]

## 2021-03-10 NOTE — NURSING NOTE
Patient given discharge instructions and reviewed with patient  Patient verbalized understanding of instructions  All belongings checked and accounted for  Medications picked up for patient by another RN in [place]  IV removed  Patient discharged from facility via wheelchair and escort to [facility]

## 2021-04-14 ENCOUNTER — OFFICE VISIT (OUTPATIENT)
Dept: OBGYN CLINIC | Facility: HOSPITAL | Age: 46
End: 2021-04-14
Payer: COMMERCIAL

## 2021-04-14 VITALS
BODY MASS INDEX: 19.99 KG/M2 | WEIGHT: 120 LBS | HEIGHT: 65 IN | HEART RATE: 120 BPM | DIASTOLIC BLOOD PRESSURE: 94 MMHG | SYSTOLIC BLOOD PRESSURE: 126 MMHG

## 2021-04-14 DIAGNOSIS — S60.453A FOREIGN BODY OF LEFT MIDDLE FINGER: Primary | ICD-10-CM

## 2021-04-14 DIAGNOSIS — M25.511 ACUTE PAIN OF RIGHT SHOULDER: ICD-10-CM

## 2021-04-14 DIAGNOSIS — M79.645 PAIN OF LEFT MIDDLE FINGER: ICD-10-CM

## 2021-04-14 PROCEDURE — 99213 OFFICE O/P EST LOW 20 MIN: CPT | Performed by: ORTHOPAEDIC SURGERY

## 2021-04-14 PROCEDURE — 3008F BODY MASS INDEX DOCD: CPT | Performed by: ORTHOPAEDIC SURGERY

## 2021-04-14 RX ORDER — GABAPENTIN 600 MG/1
600 TABLET ORAL 2 TIMES DAILY
COMMUNITY
Start: 2021-04-09

## 2021-04-14 RX ORDER — INSULIN GLARGINE 100 [IU]/ML
INJECTION, SOLUTION SUBCUTANEOUS
Status: ON HOLD | COMMUNITY
Start: 2021-04-08 | End: 2021-08-09 | Stop reason: SDUPTHER

## 2021-04-14 RX ORDER — LANCETS 28 GAUGE
EACH MISCELLANEOUS
COMMUNITY
Start: 2021-03-09

## 2021-04-14 RX ORDER — SODIUM CHLORIDE 0.9 % (FLUSH) 0.9 %
SYRINGE (ML) INJECTION
COMMUNITY
Start: 2021-03-04

## 2021-04-14 RX ORDER — METHOCARBAMOL 750 MG/1
750 TABLET, FILM COATED ORAL EVERY 6 HOURS PRN
Status: ON HOLD | COMMUNITY
Start: 2020-12-07 | End: 2021-08-09 | Stop reason: CLARIF

## 2021-04-14 RX ORDER — TRAZODONE HYDROCHLORIDE 50 MG/1
50 TABLET ORAL
COMMUNITY
Start: 2021-03-29

## 2021-04-14 RX ORDER — BLOOD SUGAR DIAGNOSTIC
STRIP MISCELLANEOUS 4 TIMES DAILY
COMMUNITY
Start: 2021-04-07

## 2021-04-14 RX ORDER — CHLORHEXIDINE GLUCONATE 0.12 MG/ML
15 RINSE ORAL ONCE
Status: CANCELLED | OUTPATIENT
Start: 2021-04-14 | End: 2021-04-14

## 2021-04-14 RX ORDER — INSULIN LISPRO 100 [IU]/ML
INJECTION, SOLUTION INTRAVENOUS; SUBCUTANEOUS
Status: ON HOLD | COMMUNITY
Start: 2021-04-13 | End: 2021-08-09 | Stop reason: SDUPTHER

## 2021-04-14 RX ORDER — CEFAZOLIN SODIUM 2 G/50ML
2000 SOLUTION INTRAVENOUS ONCE
Status: CANCELLED | OUTPATIENT
Start: 2021-04-14 | End: 2021-04-14

## 2021-04-14 RX ORDER — PEN NEEDLE, DIABETIC 32GX 5/32"
NEEDLE, DISPOSABLE MISCELLANEOUS
COMMUNITY
Start: 2021-03-09

## 2021-04-14 NOTE — PROGRESS NOTES
40 y o male presents for evaluation of left long finger  Patient states that he has a BB that has been lodged in his long finger for approximately 30 years  He states that this occasionally causes him discomfort at work when lifting heavy [employer] unit  He denies any associated numbness or tingling  He does have a desire to have this removed both for cosmetic reasons and for pain and discomfort while at work  Patient notably has a medical history significant for uncontrolled diabetes for which he was recently admitted hospitalized for approximately 10 days for DKA  He also admits to smoking approximately 1 pack of cigarettes per day  Review of Systems  Review of systems negative unless otherwise specified in HPI    Past Medical History  Past Medical History:   Diagnosis Date    Diabetes mellitus ([facility] [address] )     type 2    Hypertension     Psychiatric disorder     depression       Past Surgical History  Past Surgical History:   Procedure Laterality Date    CT GUIDED PERC DRAINAGE CATHETER PLACEMENT  3/4/2021    CYSTOSCOPY N/A 3/1/2021    Procedure: CYSTOSCOPY;  Surgeon: Vicki Bassett MD;  [facility];  Service: Urology    IR ASPIRATION ONLY  3/5/2021    IR DRAINAGE TUBE CHECK AND/OR REMOVAL  3/8/2021    TRANSURETHRAL RESECTION OF PROSTATE N/A 3/1/2021    Procedure: TRANSURETHRAL RESECTION OF URETHERAL PROSTATE ABSCESS(TURP);   Surgeon: Vicki Bassett MD;  [facility];  Service: Urology       Current Medications  Current Outpatient Medications on File Prior to Visit   Medication Sig Dispense Refill    B-D UF III MINI PEN NEEDLES 31G X 5 MM MISC       B-D ULTRAFINE III SHORT PEN 31G X 8 MM MISC       BD Pen Needle Tita U/F 32G X 4 MM MISC       Blood Glucose Monitoring Suppl (ACCU-CHEK GUIDE ME) w/Device KIT       citalopram (CeleXA) 20 mg tablet       gabapentin (NEURONTIN) 300 mg capsule Take 1 capsule (300 mg total) by mouth 3 (three) times a day 90 capsule 0    gabapentin (NEURONTIN) 600 MG tablet Take 600 mg by mouth 2 (two) times a day      glucose monitoring kit (FREESTYLE) monitoring kit Use 1 each 4 (four) times a day 1 each 0    insulin glargine (LANTUS) 100 units/mL subcutaneous injection Inject 45 Units under the skin daily at bedtime 10 mL 0    Lancets (freestyle) lancets       Lantus SoloStar 100 units/mL injection pen inject 45 units subcutaneously once daily [id number]:E11 [id number]      nicotine (NICODERM CQ) 21 mg/24 hr TD 24 hr patch Place 1 patch on the skin daily 28 patch 0    OneTouch Ultra test strip 4 (four) times a day Test blood sugar      Sodium Chloride Flush (Normal Saline Flush) 0 9 % SOLN       traZODone (DESYREL) 50 mg tablet Take 50 mg by mouth daily at bedtime      insulin lispro (HumaLOG) 100 units/mL injection pen INJECT 15 UNITS 3 TIMES A DAY BY SUBCUTANEOUS ROUTE WITH MEALS FOR 30 DAYS      insulin lispro (HumaLOG) 100 units/mL injection Inject 15 Units under the skin 3 (three) times a day with meals 10 mL 0    metFORMIN (GLUCOPHAGE) 1000 MG tablet Take 1,000 mg by mouth 2 (two) times a day      methocarbamol (ROBAXIN) 750 mg tablet Take 750 mg by mouth every 6 (six) hours as needed      polyethylene glycol (MIRALAX) 17 g packet Take 17 g by mouth 2 (two) times a day before meals for 7 days 30 g 0     No current facility-administered medications on file prior to visit          Recent Labs [facility])  0   Lab Value Date/Time    HCT 35 1 (L) 03/09/2021 0440    HGB 11 1 (L) 03/09/2021 0440    WBC 15 28 (H) 03/09/2021 0440    INR 0 90 03/08/2021 0512    HGBA1C >14 0 (H) 03/01/2021 0445         Physical exam  · General: Awake, Alert, Oriented  · Eyes: Pupils equal, round and reactive to light  · Heart: regular rate and rhythm  · Lungs: No audible wheezing  · Abdomen: soft  LUE   · Skin intact, no erythema, no ecchymosis   · Negative Tinel's over prominent subcutaneous foreign body that is located over the volar aspect of the proximal phalanx of the left long finger  · Able to form a composite fist   · Motor intact to median, radial, ain, and PIN, ulnar nerve distributions  · Sensation grossly intact to median, radial, ulnar nerve distributions   · Brisk cap refill distally    Procedure  None     Imaging  X-rays previously taken reviewed with patient today's visit  This reveals a subcutaneous metallic foreign body over the volar aspect of the left long finger proximal phalanx    81-year-old male right-hand dominant with left long finger subcutaneous metallic foreign body    Long discussion was had with patient concerning the nature of the procedure of removing metallic foreign body from left long finger  It was stressed to the patient that he has at increased risk of infection both from a smoking as well as uncontrolled diabetes standpoint  Patient is adamant that he would like the foreign body removed  He is willing to accept these risks  After discussion of risks and benefits, informed consent was obtained  Patient will be scheduled for removal of foreign body from left long finger at a date of his choosing at today's office visit  He will follow up in office 2 weeks from the date of surgery

## 2021-04-14 NOTE — H&P (VIEW-ONLY)
40 y o male presents for evaluation of left long finger  Patient states that he has a BB that has been lodged in his long finger for approximately 30 years  He states that this occasionally causes him discomfort at work when lifting heavy [employer] unit  He denies any associated numbness or tingling  He does have a desire to have this removed both for cosmetic reasons and for pain and discomfort while at work  Patient notably has a medical history significant for uncontrolled diabetes for which he was recently admitted hospitalized for approximately 10 days for DKA  He also admits to smoking approximately 1 pack of cigarettes per day  Review of Systems  Review of systems negative unless otherwise specified in HPI    Past Medical History  Past Medical History:   Diagnosis Date    Diabetes mellitus ([facility] [address] )     type 2    Hypertension     Psychiatric disorder     depression       Past Surgical History  Past Surgical History:   Procedure Laterality Date    CT GUIDED PERC DRAINAGE CATHETER PLACEMENT  3/4/2021    CYSTOSCOPY N/A 3/1/2021    Procedure: CYSTOSCOPY;  Surgeon: Lakisha Curiel MD;  [facility];  Service: Urology    IR ASPIRATION ONLY  3/5/2021    IR DRAINAGE TUBE CHECK AND/OR REMOVAL  3/8/2021    TRANSURETHRAL RESECTION OF PROSTATE N/A 3/1/2021    Procedure: TRANSURETHRAL RESECTION OF URETHERAL PROSTATE ABSCESS(TURP);   Surgeon: Lakisha Curiel MD;  [facility];  Service: Urology       Current Medications  Current Outpatient Medications on File Prior to Visit   Medication Sig Dispense Refill    B-D UF III MINI PEN NEEDLES 31G X 5 MM MISC       B-D ULTRAFINE III SHORT PEN 31G X 8 MM MISC       BD Pen Needle Tita U/F 32G X 4 MM MISC       Blood Glucose Monitoring Suppl (ACCU-CHEK GUIDE ME) w/Device KIT       citalopram (CeleXA) 20 mg tablet       gabapentin (NEURONTIN) 300 mg capsule Take 1 capsule (300 mg total) by mouth 3 (three) times a day 90 capsule 0    gabapentin (NEURONTIN) 600 MG tablet Take 600 mg by mouth 2 (two) times a day      glucose monitoring kit (FREESTYLE) monitoring kit Use 1 each 4 (four) times a day 1 each 0    insulin glargine (LANTUS) 100 units/mL subcutaneous injection Inject 45 Units under the skin daily at bedtime 10 mL 0    Lancets (freestyle) lancets       Lantus SoloStar 100 units/mL injection pen inject 45 units subcutaneously once daily [id number] [id number]      nicotine (NICODERM CQ) 21 mg/24 hr TD 24 hr patch Place 1 patch on the skin daily 28 patch 0    OneTouch Ultra test strip 4 (four) times a day Test blood sugar      Sodium Chloride Flush (Normal Saline Flush) 0 9 % SOLN       traZODone (DESYREL) 50 mg tablet Take 50 mg by mouth daily at bedtime      insulin lispro (HumaLOG) 100 units/mL injection pen INJECT 15 UNITS 3 TIMES A DAY BY SUBCUTANEOUS ROUTE WITH MEALS FOR 30 DAYS      insulin lispro (HumaLOG) 100 units/mL injection Inject 15 Units under the skin 3 (three) times a day with meals 10 mL 0    metFORMIN (GLUCOPHAGE) 1000 MG tablet Take 1,000 mg by mouth 2 (two) times a day      methocarbamol (ROBAXIN) 750 mg tablet Take 750 mg by mouth every 6 (six) hours as needed      polyethylene glycol (MIRALAX) 17 g packet Take 17 g by mouth 2 (two) times a day before meals for 7 days 30 g 0     No current facility-administered medications on file prior to visit          Recent Labs [facility])  0   Lab Value Date/Time    HCT 35 1 (L) 03/09/2021 0440    HGB 11 1 (L) 03/09/2021 0440    WBC 15 28 (H) 03/09/2021 0440    INR 0 90 03/08/2021 0512    HGBA1C >14 0 (H) 03/01/2021 0445         Physical exam  · General: Awake, Alert, Oriented  · Eyes: Pupils equal, round and reactive to light  · Heart: regular rate and rhythm  · Lungs: No audible wheezing  · Abdomen: soft  LUE   · Skin intact, no erythema, no ecchymosis   · Negative Tinel's over prominent subcutaneous foreign body that is located over the volar aspect of the proximal phalanx of the left long finger  · Able to form a composite fist   · Motor intact to median, radial, ain, and PIN, ulnar nerve distributions  · Sensation grossly intact to median, radial, ulnar nerve distributions   · Brisk cap refill distally    Procedure  None     Imaging  X-rays previously taken reviewed with patient today's visit  This reveals a subcutaneous metallic foreign body over the volar aspect of the left long finger proximal phalanx    28-year-old male right-hand dominant with left long finger subcutaneous metallic foreign body    Long discussion was had with patient concerning the nature of the procedure of removing metallic foreign body from left long finger  It was stressed to the patient that he has at increased risk of infection both from a smoking as well as uncontrolled diabetes standpoint  Patient is adamant that he would like the foreign body removed  He is willing to accept these risks  After discussion of risks and benefits, informed consent was obtained  Patient will be scheduled for removal of foreign body from left long finger at a date of his choosing at today's office visit  He will follow up in office 2 weeks from the date of surgery

## 2021-04-28 ENCOUNTER — IMMUNIZATIONS (OUTPATIENT)
Dept: FAMILY MEDICINE CLINIC | Facility: HOSPITAL | Age: 46
End: 2021-04-28

## 2021-04-28 DIAGNOSIS — Z23 ENCOUNTER FOR IMMUNIZATION: Primary | ICD-10-CM

## 2021-04-28 PROCEDURE — 91301 SARS-COV-2 / COVID-19 MRNA VACCINE (MODERNA) 100 MCG: CPT

## 2021-04-28 PROCEDURE — 0011A SARS-COV-2 / COVID-19 MRNA VACCINE (MODERNA) 100 MCG: CPT

## 2021-05-12 ENCOUNTER — ANESTHESIA EVENT (OUTPATIENT)
Dept: PERIOP | Facility: HOSPITAL | Age: 46
End: 2021-05-12
Payer: COMMERCIAL

## 2021-05-12 NOTE — ANESTHESIA PREPROCEDURE EVALUATION
Procedure:  REMOVAL FOREIGN BODY EXTREMITY (left long finger) (Left Hand)    Relevant Problems   ANESTHESIA (within normal limits)      CARDIO   (+) Hypertension      ENDO   (+) Type 2 diabetes mellitus with hyperglycemia, with long-term current use of insulin (HCC)      GI/HEPATIC (within normal limits)      /RENAL   (+) Prostate abscess      HEMATOLOGY (within normal limits)      NEURO/PSYCH (within normal limits)      PULMONARY   (+) Smoking      Other   (+) Foreign body of left middle finger   (+) Pyelonephritis     Lantus 45 U last evening (normal dose)    HERMANN 3/8/2021:  IMPRESSIONS:  There was no echocardiographic evidence for valvular vegetation      SUMMARY     LEFT VENTRICLE:  Size was normal   Systolic function was normal  Ejection fraction was estimated to be 65 %  There were no regional wall motion abnormalities  Wall thickness was normal   There was no evidence of concentric hypertrophy      RIGHT VENTRICLE:  The size was normal   Systolic function was normal      MITRAL VALVE:  There was trace regurgitation      AORTA:  The root exhibited mild dilatation at 33 mm (21 0 mm/m2)  There was mild atheroma in the distal aortic arch  Lab Results   Component Value Date    WBC 15 28 (H) 03/09/2021    HGB 11 1 (L) 03/09/2021    HCT 35 1 (L) 03/09/2021    MCV 89 03/09/2021     (H) 03/09/2021     Lab Results   Component Value Date    SODIUM 138 03/09/2021    K 3 9 03/09/2021     03/09/2021    CO2 31 03/09/2021    BUN 12 03/09/2021    CREATININE 0 54 (L) 03/09/2021    GLUC 197 (H) 03/09/2021    CALCIUM 9 2 03/09/2021     Lab Results   Component Value Date    INR 0 90 03/08/2021    INR 0 96 03/04/2021    INR 1 02 02/28/2021    PROTIME 12 2 03/08/2021    PROTIME 12 8 03/04/2021    PROTIME 13 4 02/28/2021     Lab Results   Component Value Date    HGBA1C >14 0 (H) 03/01/2021                 Anesthesia Plan  ASA Score- 3     Anesthesia Type- general with ASA Monitors           Additional Monitors: Airway Plan: LMA  Plan Factors-    Chart reviewed  EKG reviewed  Existing labs reviewed  Patient summary reviewed  Induction- intravenous      Postoperative Plan-     Informed Consent-

## 2021-05-12 NOTE — PRE-PROCEDURE INSTRUCTIONS
Pre-Surgery Instructions:  Have you had / have a sore throat? no  have you had / have a cough less than 1 week? no  Have you had / have a fever greater than 100 0 - 100  4? no  Are you experiencing any shortness of breath? No    Pre procedure instructions given, verbalizes understanding, reviewed on 5/12 with understanding at this time       Medication Instructions    citalopram (CeleXA) 20 mg tablet Instructed patient per Anesthesia Guidelines  taking on 5/12    gabapentin (NEURONTIN) 300 mg capsule Instructed patient per Anesthesia Guidelines  taking on 5/12    gabapentin (NEURONTIN) 600 MG tablet Instructed patient per Anesthesia Guidelines  taking on 5/12    insulin glargine (LANTUS) 100 units/mL subcutaneous injection Instructed patient per Anesthesia Guidelines  taking on 5/12 pm    insulin lispro (HumaLOG) 100 units/mL injection Instructed patient per Anesthesia Guidelines  HOLDING on 5/13    traZODone (DESYREL) 50 mg tablet Instructed patient per Anesthesia Guidelines  Taking on 5/12 pm

## 2021-05-13 ENCOUNTER — ANESTHESIA (OUTPATIENT)
Dept: PERIOP | Facility: HOSPITAL | Age: 46
End: 2021-05-13
Payer: COMMERCIAL

## 2021-05-13 ENCOUNTER — HOSPITAL ENCOUNTER (OUTPATIENT)
Facility: HOSPITAL | Age: 46
Setting detail: OUTPATIENT SURGERY
Discharge: HOME | End: 2021-05-13
Attending: ORTHOPAEDIC SURGERY | Admitting: ORTHOPAEDIC SURGERY
Payer: COMMERCIAL

## 2021-05-13 VITALS
RESPIRATION RATE: 16 BRPM | WEIGHT: 140 LBS | BODY MASS INDEX: 23.32 KG/M2 | HEART RATE: 95 BPM | OXYGEN SATURATION: 97 % | DIASTOLIC BLOOD PRESSURE: 85 MMHG | SYSTOLIC BLOOD PRESSURE: 148 MMHG | TEMPERATURE: 97.8 F | HEIGHT: 65 IN

## 2021-05-13 DIAGNOSIS — S60.453A FOREIGN BODY OF LEFT MIDDLE FINGER: Primary | ICD-10-CM

## 2021-05-13 PROBLEM — F17.200 SMOKING: Status: ACTIVE | Noted: 2021-02-28

## 2021-05-13 LAB
GLUCOSE SERPL-MCNC: 155 MG/DL (ref 65–140)
GLUCOSE SERPL-MCNC: 163 MG/DL (ref 65–140)

## 2021-05-13 PROCEDURE — 10120 INC&RMVL FB SUBQ TISS SMPL: CPT | Performed by: ORTHOPAEDIC SURGERY

## 2021-05-13 PROCEDURE — 82948 REAGENT STRIP/BLOOD GLUCOSE: CPT

## 2021-05-13 RX ORDER — CEFAZOLIN SODIUM 2 G/50ML
2000 SOLUTION INTRAVENOUS ONCE
Status: COMPLETED | OUTPATIENT
Start: 2021-05-13 | End: 2021-05-13

## 2021-05-13 RX ORDER — SODIUM CHLORIDE, SODIUM LACTATE, POTASSIUM CHLORIDE, CALCIUM CHLORIDE 600; 310; 30; 20 MG/100ML; MG/100ML; MG/100ML; MG/100ML
INJECTION, SOLUTION INTRAVENOUS CONTINUOUS PRN
Status: DISCONTINUED | OUTPATIENT
Start: 2021-05-13 | End: 2021-05-13

## 2021-05-13 RX ORDER — FENTANYL CITRATE 50 UG/ML
INJECTION, SOLUTION INTRAMUSCULAR; INTRAVENOUS AS NEEDED
Status: DISCONTINUED | OUTPATIENT
Start: 2021-05-13 | End: 2021-05-13

## 2021-05-13 RX ORDER — LIDOCAINE HYDROCHLORIDE 10 MG/ML
INJECTION, SOLUTION EPIDURAL; INFILTRATION; INTRACAUDAL; PERINEURAL AS NEEDED
Status: DISCONTINUED | OUTPATIENT
Start: 2021-05-13 | End: 2021-05-13

## 2021-05-13 RX ORDER — FENTANYL CITRATE/PF 50 MCG/ML
25 SYRINGE (ML) INJECTION
Status: DISCONTINUED | OUTPATIENT
Start: 2021-05-13 | End: 2021-05-13 | Stop reason: HOSPADM

## 2021-05-13 RX ORDER — LIDOCAINE HYDROCHLORIDE AND EPINEPHRINE 10; 10 MG/ML; UG/ML
INJECTION, SOLUTION INFILTRATION; PERINEURAL AS NEEDED
Status: DISCONTINUED | OUTPATIENT
Start: 2021-05-13 | End: 2021-05-13 | Stop reason: HOSPADM

## 2021-05-13 RX ORDER — CHLORHEXIDINE GLUCONATE 0.12 MG/ML
15 RINSE ORAL ONCE
Status: DISCONTINUED | OUTPATIENT
Start: 2021-05-13 | End: 2021-05-13

## 2021-05-13 RX ORDER — OXYCODONE HYDROCHLORIDE 5 MG/1
5 TABLET ORAL EVERY 4 HOURS PRN
Qty: 15 TABLET | Refills: 0 | Status: SHIPPED | OUTPATIENT
Start: 2021-05-13 | End: 2021-05-23

## 2021-05-13 RX ORDER — PROPOFOL 10 MG/ML
INJECTION, EMULSION INTRAVENOUS AS NEEDED
Status: DISCONTINUED | OUTPATIENT
Start: 2021-05-13 | End: 2021-05-13

## 2021-05-13 RX ORDER — ONDANSETRON 2 MG/ML
INJECTION INTRAMUSCULAR; INTRAVENOUS AS NEEDED
Status: DISCONTINUED | OUTPATIENT
Start: 2021-05-13 | End: 2021-05-13

## 2021-05-13 RX ADMIN — PROPOFOL 200 MG: 10 INJECTION, EMULSION INTRAVENOUS at 07:32

## 2021-05-13 RX ADMIN — ONDANSETRON 4 MG: 2 INJECTION INTRAMUSCULAR; INTRAVENOUS at 07:32

## 2021-05-13 RX ADMIN — PHENYLEPHRINE HYDROCHLORIDE 100 MCG: 10 INJECTION INTRAVENOUS at 07:41

## 2021-05-13 RX ADMIN — LIDOCAINE HYDROCHLORIDE 50 MG: 10 INJECTION, SOLUTION EPIDURAL; INFILTRATION; INTRACAUDAL; PERINEURAL at 07:32

## 2021-05-13 RX ADMIN — FENTANYL CITRATE 25 MCG: 50 INJECTION INTRAMUSCULAR; INTRAVENOUS at 07:34

## 2021-05-13 RX ADMIN — SODIUM CHLORIDE, SODIUM LACTATE, POTASSIUM CHLORIDE, AND CALCIUM CHLORIDE: .6; .31; .03; .02 INJECTION, SOLUTION INTRAVENOUS at 07:14

## 2021-05-13 RX ADMIN — CEFAZOLIN SODIUM 2000 MG: 2 SOLUTION INTRAVENOUS at 07:32

## 2021-05-13 RX ADMIN — PHENYLEPHRINE HYDROCHLORIDE 100 MCG: 10 INJECTION INTRAVENOUS at 07:36

## 2021-05-13 NOTE — ANESTHESIA POSTPROCEDURE EVALUATION
Post-Op Assessment Note    CV Status:  Stable  Pain Score: 0    Pain management: adequate     Mental Status:  Sleepy   Hydration Status:  Euvolemic   PONV Controlled:  None   Airway Patency:  Patent      Post Op Vitals Reviewed: Yes      Staff: Anesthesiologist, CRNA         No complications documented      BP   79/45   Temp 98   Pulse  80   Resp   20   SpO2 98

## 2021-05-13 NOTE — INTERVAL H&P NOTE
H&P reviewed  After examining the patient I find no changes in the patients condition since the H&P had been written  Vitals:    05/13/21 0555   BP: 156/96   Pulse: 99   Resp: 18   Temp: (!) 97 4 °F (36 3 °C)   SpO2: 100%   Head:  Present  CVS:  RRR  Lungs:  Clear bilateral  Assessment:  Foreign body left long finger of this adult male  Plan:   To OR for removal of foreign body left long finger

## 2021-05-13 NOTE — INTERVAL H&P NOTE
H&P reviewed  After examining the patient I find no changes in the patients condition since the H&P had been written  Vitals:    05/13/21 0555   BP: 156/96   Pulse: 99   Resp: 18   Temp: (!) 97 4 °F (36 3 °C)   SpO2: 100%   CVS:  RRR  Lungs:  Clear bilateral  Assessment:  Foreign body in the left long finger of this adult male  Plan:   To OR for removal of foreign body from the left long finger

## 2021-05-13 NOTE — OP NOTE
OPERATIVE REPORT  PATIENT NAME: Cristhian Vann    :  1975  MRN: [id number]  Pt Location: [facility] OR ROOM 04    SURGERY DATE: 2021    Surgeon(s) and Role:     * Thais Kim MD - Primary     * August Anand PA-C - Rick Schofield MD - Assisting    Preop Diagnosis:  Foreign body of left middle finger [S60 453A]    Post-Op Diagnosis Codes:     * Foreign body of left middle finger [S60 453A]    Procedure(s) (LRB):  REMOVAL FOREIGN BODY EXTREMITY (left long finger) (Left)    Specimen(s):  * No specimens in log *    Estimated Blood Loss:   Minimal    Drains:  * No LDAs found *    Anesthesia Type:   Choice    Operative Indications:  Foreign body of left middle finger [S60 453A]      Operative Findings:  Metallic foreign body removed    Complications:   None    Procedure and Technique: Following induction of adequate level of general anesthesia, the left upper extremity then prepped draped sterilely  Antibiotics administered  No tourniquet utilized  The skin overlying the proximal phalanx of the palmar aspect of the left long finger was then incised  The subcutaneous foreign body was then grasped with an instrument removed  The wounds then flushed with saline closed with 2-0 nylon suture  A ring block with Marcaine was then provided for postoperative pain relief  Sterile dressings were applied    He was then awakened from general anesthesia, taken recovery room stable condition plans to consist of follow up in the office as an outpatient   I was present for the entire procedure    Patient Disposition:  PACU     SIGNATURE: Thais Kim MD  DATE: May 13, 2021  TIME: 7:50 AM

## 2021-05-13 NOTE — DISCHARGE INSTRUCTIONS
Discharge Instructions - Orthopedics  Bindu Roberto 40 y o  male MRN: [id number]  Unit/Bed#: PACU 10    Weight Bearing Status:                                           Light use of left upper extremity until suture removal   No heavy lifting or strenuous use of left upper extremity     Pain:  Continue analgesics as directed  May take over the counter tylenol for additional pain relief  Follow instructions on the bottle  Dressing Instructions:   Please keep clean, dry and intact until follow up   May remove dressing after 5 days for bathing  Please pat dry after bathing and keep covered until follow up for suture removal   No soaking of hand incision  Appt Instructions: If you do not have your appointment, please call the clinic at [phone]   Otherwise followup as scheduled     Contact the office sooner if you experience any increased numbness/tingling in the extremities

## 2021-08-05 ENCOUNTER — APPOINTMENT (EMERGENCY)
Dept: RADIOLOGY | Facility: HOSPITAL | Age: 46
DRG: 720 | End: 2021-08-05
Payer: COMMERCIAL

## 2021-08-05 ENCOUNTER — HOSPITAL ENCOUNTER (INPATIENT)
Facility: HOSPITAL | Age: 46
LOS: 3 days | Discharge: HOME | DRG: 720 | End: 2021-08-09
Attending: EMERGENCY MEDICINE | Admitting: HOSPITALIST
Payer: COMMERCIAL

## 2021-08-05 DIAGNOSIS — K52.9 COLITIS: ICD-10-CM

## 2021-08-05 DIAGNOSIS — Z79.4 TYPE 2 DIABETES MELLITUS WITH HYPERGLYCEMIA, WITH LONG-TERM CURRENT USE OF INSULIN (HCC): ICD-10-CM

## 2021-08-05 DIAGNOSIS — A41.9 SEPSIS (HCC): Primary | ICD-10-CM

## 2021-08-05 DIAGNOSIS — K92.0 HEMATEMESIS: ICD-10-CM

## 2021-08-05 DIAGNOSIS — E11.65 TYPE 2 DIABETES MELLITUS WITH HYPERGLYCEMIA, WITH LONG-TERM CURRENT USE OF INSULIN (HCC): ICD-10-CM

## 2021-08-05 LAB
ABO GROUP BLD: NORMAL
ABO GROUP BLD: NORMAL
ALBUMIN SERPL BCP-MCNC: 3.8 G/DL (ref 3.5–5)
ALP SERPL-CCNC: 80 U/L (ref 46–116)
ALT SERPL W P-5'-P-CCNC: 21 U/L (ref 12–78)
ANION GAP SERPL CALCULATED.3IONS-SCNC: 12 MMOL/L (ref 4–13)
ANISOCYTOSIS BLD QL SMEAR: PRESENT
APTT PPP: 24 SECONDS (ref 23–37)
AST SERPL W P-5'-P-CCNC: 12 U/L (ref 5–45)
BASOPHILS # BLD MANUAL: 0.12 THOUSAND/UL (ref 0–0.1)
BASOPHILS NFR MAR MANUAL: 1 % (ref 0–1)
BILIRUB SERPL-MCNC: 0.33 MG/DL (ref 0.2–1)
BLD GP AB SCN SERPL QL: NEGATIVE
BUN SERPL-MCNC: 19 MG/DL (ref 5–25)
CALCIUM SERPL-MCNC: 9 MG/DL (ref 8.3–10.1)
CHLORIDE SERPL-SCNC: 108 MMOL/L (ref 100–108)
CO2 SERPL-SCNC: 21 MMOL/L (ref 21–32)
CREAT SERPL-MCNC: 1.32 MG/DL (ref 0.6–1.3)
EOSINOPHIL # BLD MANUAL: 0 THOUSAND/UL (ref 0–0.4)
EOSINOPHIL NFR BLD MANUAL: 0 % (ref 0–6)
ERYTHROCYTE [DISTWIDTH] IN BLOOD BY AUTOMATED COUNT: 13.1 % (ref 11.6–15.1)
GFR SERPL CREATININE-BSD FRML MDRD: 65 ML/MIN/1.73SQ M
GIANT PLATELETS BLD QL SMEAR: PRESENT
GLUCOSE SERPL-MCNC: 237 MG/DL (ref 65–140)
GLUCOSE SERPL-MCNC: 248 MG/DL (ref 65–140)
HCT VFR BLD AUTO: 42.5 % (ref 36.5–49.3)
HGB BLD-MCNC: 14.3 G/DL (ref 12–17)
INR PPP: 1.02 (ref 0.84–1.19)
LACTATE SERPL-SCNC: 1.1 MMOL/L (ref 0.5–2)
LACTATE SERPL-SCNC: 4.9 MMOL/L (ref 0.5–2)
LIPASE SERPL-CCNC: 90 U/L (ref 73–393)
LYMPHOCYTES # BLD AUTO: 0.5 THOUSAND/UL (ref 0.6–4.47)
LYMPHOCYTES # BLD AUTO: 4 % (ref 14–44)
MCH RBC QN AUTO: 29.1 PG (ref 26.8–34.3)
MCHC RBC AUTO-ENTMCNC: 33.6 G/DL (ref 31.4–37.4)
MCV RBC AUTO: 86 FL (ref 82–98)
METAMYELOCYTES NFR BLD MANUAL: 10 % (ref 0–1)
MICROCYTES BLD QL AUTO: PRESENT
MONOCYTES # BLD AUTO: 1.11 THOUSAND/UL (ref 0–1.22)
MONOCYTES NFR BLD: 9 % (ref 4–12)
NEUTROPHILS # BLD MANUAL: 9.41 THOUSAND/UL (ref 1.85–7.62)
NEUTS BAND NFR BLD MANUAL: 35 % (ref 0–8)
NEUTS SEG NFR BLD AUTO: 41 % (ref 43–75)
NRBC BLD AUTO-RTO: 0 /100 WBCS
PLATELET # BLD AUTO: 279 THOUSANDS/UL (ref 149–390)
PLATELET BLD QL SMEAR: ADEQUATE
PMV BLD AUTO: 9.4 FL (ref 8.9–12.7)
POLYCHROMASIA BLD QL SMEAR: PRESENT
POTASSIUM SERPL-SCNC: 4.1 MMOL/L (ref 3.5–5.3)
PROCALCITONIN SERPL-MCNC: 2.22 NG/ML
PROT SERPL-MCNC: 7.3 G/DL (ref 6.4–8.2)
PROTHROMBIN TIME: 13.4 SECONDS (ref 11.6–14.5)
RBC # BLD AUTO: 4.92 MILLION/UL (ref 3.88–5.62)
RBC MORPH BLD: PRESENT
RH BLD: POSITIVE
RH BLD: POSITIVE
SMUDGE CELLS BLD QL SMEAR: PRESENT
SODIUM SERPL-SCNC: 141 MMOL/L (ref 136–145)
SPECIMEN EXPIRATION DATE: NORMAL
WBC # BLD AUTO: 12.38 THOUSAND/UL (ref 4.31–10.16)

## 2021-08-05 PROCEDURE — 99291 CRITICAL CARE FIRST HOUR: CPT | Performed by: EMERGENCY MEDICINE

## 2021-08-05 PROCEDURE — 96361 HYDRATE IV INFUSION ADD-ON: CPT

## 2021-08-05 PROCEDURE — C9113 INJ PANTOPRAZOLE SODIUM, VIA: HCPCS | Performed by: EMERGENCY MEDICINE

## 2021-08-05 PROCEDURE — 83605 ASSAY OF LACTIC ACID: CPT | Performed by: PHYSICIAN ASSISTANT

## 2021-08-05 PROCEDURE — 85730 THROMBOPLASTIN TIME PARTIAL: CPT | Performed by: EMERGENCY MEDICINE

## 2021-08-05 PROCEDURE — 82272 OCCULT BLD FECES 1-3 TESTS: CPT

## 2021-08-05 PROCEDURE — 99285 EMERGENCY DEPT VISIT HI MDM: CPT

## 2021-08-05 PROCEDURE — 83690 ASSAY OF LIPASE: CPT | Performed by: EMERGENCY MEDICINE

## 2021-08-05 PROCEDURE — 71045 X-RAY EXAM CHEST 1 VIEW: CPT

## 2021-08-05 PROCEDURE — 96365 THER/PROPH/DIAG IV INF INIT: CPT

## 2021-08-05 PROCEDURE — 83605 ASSAY OF LACTIC ACID: CPT

## 2021-08-05 PROCEDURE — 85007 BL SMEAR W/DIFF WBC COUNT: CPT | Performed by: EMERGENCY MEDICINE

## 2021-08-05 PROCEDURE — 74177 CT ABD & PELVIS W/CONTRAST: CPT

## 2021-08-05 PROCEDURE — 86850 RBC ANTIBODY SCREEN: CPT | Performed by: EMERGENCY MEDICINE

## 2021-08-05 PROCEDURE — 80053 COMPREHEN METABOLIC PANEL: CPT | Performed by: EMERGENCY MEDICINE

## 2021-08-05 PROCEDURE — 93005 ELECTROCARDIOGRAM TRACING: CPT

## 2021-08-05 PROCEDURE — 36415 COLL VENOUS BLD VENIPUNCTURE: CPT | Performed by: EMERGENCY MEDICINE

## 2021-08-05 PROCEDURE — 87040 BLOOD CULTURE FOR BACTERIA: CPT

## 2021-08-05 PROCEDURE — 82948 REAGENT STRIP/BLOOD GLUCOSE: CPT

## 2021-08-05 PROCEDURE — 86900 BLOOD TYPING SEROLOGIC ABO: CPT | Performed by: EMERGENCY MEDICINE

## 2021-08-05 PROCEDURE — G1004 CDSM NDSC: HCPCS

## 2021-08-05 PROCEDURE — 85610 PROTHROMBIN TIME: CPT | Performed by: EMERGENCY MEDICINE

## 2021-08-05 PROCEDURE — 96367 TX/PROPH/DG ADDL SEQ IV INF: CPT

## 2021-08-05 PROCEDURE — 85027 COMPLETE CBC AUTOMATED: CPT | Performed by: EMERGENCY MEDICINE

## 2021-08-05 PROCEDURE — 86901 BLOOD TYPING SEROLOGIC RH(D): CPT | Performed by: EMERGENCY MEDICINE

## 2021-08-05 PROCEDURE — 84145 PROCALCITONIN (PCT): CPT

## 2021-08-05 RX ORDER — NALOXONE HYDROCHLORIDE 1 MG/ML
INJECTION INTRAMUSCULAR; INTRAVENOUS; SUBCUTANEOUS
Status: COMPLETED
Start: 2021-08-05 | End: 2021-08-05

## 2021-08-05 RX ORDER — ONDANSETRON 2 MG/ML
1 INJECTION INTRAMUSCULAR; INTRAVENOUS ONCE
Status: COMPLETED | OUTPATIENT
Start: 2021-08-05 | End: 2021-08-05

## 2021-08-05 RX ORDER — EPINEPHRINE 0.1 MG/ML
1 SYRINGE (ML) INJECTION ONCE
Status: COMPLETED | OUTPATIENT
Start: 2021-08-05 | End: 2021-08-05

## 2021-08-05 RX ORDER — NALOXONE HYDROCHLORIDE 1 MG/ML
1 INJECTION PARENTERAL ONCE
Status: COMPLETED | OUTPATIENT
Start: 2021-08-05 | End: 2021-08-05

## 2021-08-05 RX ADMIN — SODIUM CHLORIDE 80 MG: 9 INJECTION, SOLUTION INTRAVENOUS at 20:33

## 2021-08-05 RX ADMIN — METRONIDAZOLE 500 MG: 500 INJECTION, SOLUTION INTRAVENOUS at 22:51

## 2021-08-05 RX ADMIN — SODIUM CHLORIDE 1000 ML: 0.9 INJECTION, SOLUTION INTRAVENOUS at 22:06

## 2021-08-05 RX ADMIN — IOHEXOL 100 ML: 350 INJECTION, SOLUTION INTRAVENOUS at 22:36

## 2021-08-05 RX ADMIN — CEFTRIAXONE 1000 MG: 1 INJECTION, POWDER, FOR SOLUTION INTRAMUSCULAR; INTRAVENOUS at 22:06

## 2021-08-05 RX ADMIN — SODIUM CHLORIDE 1000 ML: 0.9 INJECTION, SOLUTION INTRAVENOUS at 20:33

## 2021-08-06 PROBLEM — Z79.4 TYPE 2 DIABETES MELLITUS WITH HYPERGLYCEMIA, WITH LONG-TERM CURRENT USE OF INSULIN (HCC): Chronic | Status: ACTIVE | Noted: 2021-02-28

## 2021-08-06 PROBLEM — F17.200 SMOKING: Chronic | Status: ACTIVE | Noted: 2021-02-28

## 2021-08-06 PROBLEM — R65.20 SEVERE SEPSIS (HCC): Status: ACTIVE | Noted: 2021-02-28

## 2021-08-06 PROBLEM — K92.0 HEMATEMESIS: Status: ACTIVE | Noted: 2021-08-06

## 2021-08-06 PROBLEM — N17.9 AKI (ACUTE KIDNEY INJURY) (HCC): Status: ACTIVE | Noted: 2021-08-06

## 2021-08-06 PROBLEM — E11.65 TYPE 2 DIABETES MELLITUS WITH HYPERGLYCEMIA, WITH LONG-TERM CURRENT USE OF INSULIN (HCC): Chronic | Status: ACTIVE | Noted: 2021-02-28

## 2021-08-06 LAB
ALBUMIN SERPL BCP-MCNC: 3.2 G/DL (ref 3.5–5)
ALP SERPL-CCNC: 59 U/L (ref 46–116)
ALT SERPL W P-5'-P-CCNC: 19 U/L (ref 12–78)
ANION GAP SERPL CALCULATED.3IONS-SCNC: 6 MMOL/L (ref 4–13)
AST SERPL W P-5'-P-CCNC: 12 U/L (ref 5–45)
ATRIAL RATE: 127 BPM
BILIRUB DIRECT SERPL-MCNC: 0.14 MG/DL (ref 0–0.2)
BILIRUB SERPL-MCNC: 0.59 MG/DL (ref 0.2–1)
BUN SERPL-MCNC: 14 MG/DL (ref 5–25)
CALCIUM SERPL-MCNC: 8.1 MG/DL (ref 8.3–10.1)
CHLORIDE SERPL-SCNC: 113 MMOL/L (ref 100–108)
CO2 SERPL-SCNC: 24 MMOL/L (ref 21–32)
CREAT SERPL-MCNC: 1.07 MG/DL (ref 0.6–1.3)
ERYTHROCYTE [DISTWIDTH] IN BLOOD BY AUTOMATED COUNT: 13.1 % (ref 11.6–15.1)
ERYTHROCYTE [DISTWIDTH] IN BLOOD BY AUTOMATED COUNT: 13.2 % (ref 11.6–15.1)
EST. AVERAGE GLUCOSE BLD GHB EST-MCNC: 192 MG/DL
GFR SERPL CREATININE-BSD FRML MDRD: 83 ML/MIN/1.73SQ M
GLUCOSE SERPL-MCNC: 151 MG/DL (ref 65–140)
GLUCOSE SERPL-MCNC: 151 MG/DL (ref 65–140)
GLUCOSE SERPL-MCNC: 95 MG/DL (ref 65–140)
HBA1C MFR BLD: 8.3 %
HCT VFR BLD AUTO: 36.5 % (ref 36.5–49.3)
HCT VFR BLD AUTO: 40.1 % (ref 36.5–49.3)
HGB BLD-MCNC: 12.4 G/DL (ref 12–17)
HGB BLD-MCNC: 13.3 G/DL (ref 12–17)
INR PPP: 1.04 (ref 0.84–1.19)
MAGNESIUM SERPL-MCNC: 1.9 MG/DL (ref 1.6–2.6)
MCH RBC QN AUTO: 28.6 PG (ref 26.8–34.3)
MCH RBC QN AUTO: 29 PG (ref 26.8–34.3)
MCHC RBC AUTO-ENTMCNC: 33.2 G/DL (ref 31.4–37.4)
MCHC RBC AUTO-ENTMCNC: 34 G/DL (ref 31.4–37.4)
MCV RBC AUTO: 85 FL (ref 82–98)
MCV RBC AUTO: 86 FL (ref 82–98)
P AXIS: 61 DEGREES
PHOSPHATE SERPL-MCNC: 4.2 MG/DL (ref 2.7–4.5)
PLATELET # BLD AUTO: 249 THOUSANDS/UL (ref 149–390)
PLATELET # BLD AUTO: 262 THOUSANDS/UL (ref 149–390)
PMV BLD AUTO: 9.2 FL (ref 8.9–12.7)
PMV BLD AUTO: 9.2 FL (ref 8.9–12.7)
POTASSIUM SERPL-SCNC: 4.1 MMOL/L (ref 3.5–5.3)
PR INTERVAL: 156 MS
PROCALCITONIN SERPL-MCNC: 54.54 NG/ML
PROT SERPL-MCNC: 6.5 G/DL (ref 6.4–8.2)
PROTHROMBIN TIME: 13.6 SECONDS (ref 11.6–14.5)
QRS AXIS: 75 DEGREES
QRSD INTERVAL: 90 MS
QT INTERVAL: 290 MS
QTC INTERVAL: 421 MS
RBC # BLD AUTO: 4.28 MILLION/UL (ref 3.88–5.62)
RBC # BLD AUTO: 4.65 MILLION/UL (ref 3.88–5.62)
SODIUM SERPL-SCNC: 143 MMOL/L (ref 136–145)
T WAVE AXIS: 53 DEGREES
VENTRICULAR RATE: 127 BPM
WBC # BLD AUTO: 12.06 THOUSAND/UL (ref 4.31–10.16)
WBC # BLD AUTO: 13.69 THOUSAND/UL (ref 4.31–10.16)

## 2021-08-06 PROCEDURE — 80076 HEPATIC FUNCTION PANEL: CPT | Performed by: HOSPITALIST

## 2021-08-06 PROCEDURE — 99232 SBSQ HOSP IP/OBS MODERATE 35: CPT | Performed by: NURSE PRACTITIONER

## 2021-08-06 PROCEDURE — 84145 PROCALCITONIN (PCT): CPT

## 2021-08-06 PROCEDURE — 99254 IP/OBS CNSLTJ NEW/EST MOD 60: CPT | Performed by: INTERNAL MEDICINE

## 2021-08-06 PROCEDURE — 83036 HEMOGLOBIN GLYCOSYLATED A1C: CPT | Performed by: HOSPITALIST

## 2021-08-06 PROCEDURE — 82948 REAGENT STRIP/BLOOD GLUCOSE: CPT

## 2021-08-06 PROCEDURE — 99223 1ST HOSP IP/OBS HIGH 75: CPT | Performed by: HOSPITALIST

## 2021-08-06 PROCEDURE — 85610 PROTHROMBIN TIME: CPT | Performed by: HOSPITALIST

## 2021-08-06 PROCEDURE — 85027 COMPLETE CBC AUTOMATED: CPT | Performed by: NURSE PRACTITIONER

## 2021-08-06 PROCEDURE — 93010 ELECTROCARDIOGRAM REPORT: CPT | Performed by: INTERNAL MEDICINE

## 2021-08-06 PROCEDURE — C9113 INJ PANTOPRAZOLE SODIUM, VIA: HCPCS | Performed by: HOSPITALIST

## 2021-08-06 PROCEDURE — 99255 IP/OBS CONSLTJ NEW/EST HI 80: CPT | Performed by: STUDENT IN AN ORGANIZED HEALTH CARE EDUCATION/TRAINING PROGRAM

## 2021-08-06 PROCEDURE — 84100 ASSAY OF PHOSPHORUS: CPT | Performed by: HOSPITALIST

## 2021-08-06 PROCEDURE — 80048 BASIC METABOLIC PNL TOTAL CA: CPT | Performed by: HOSPITALIST

## 2021-08-06 PROCEDURE — 83735 ASSAY OF MAGNESIUM: CPT | Performed by: HOSPITALIST

## 2021-08-06 PROCEDURE — 85027 COMPLETE CBC AUTOMATED: CPT | Performed by: HOSPITALIST

## 2021-08-06 RX ORDER — ACETAMINOPHEN 325 MG/1
650 TABLET ORAL EVERY 6 HOURS PRN
Status: DISCONTINUED | OUTPATIENT
Start: 2021-08-06 | End: 2021-08-09 | Stop reason: HOSPADM

## 2021-08-06 RX ORDER — GABAPENTIN 300 MG/1
600 CAPSULE ORAL 2 TIMES DAILY
Status: DISCONTINUED | OUTPATIENT
Start: 2021-08-06 | End: 2021-08-09 | Stop reason: HOSPADM

## 2021-08-06 RX ORDER — PANTOPRAZOLE SODIUM 40 MG/1
40 INJECTION, POWDER, FOR SOLUTION INTRAVENOUS EVERY 12 HOURS SCHEDULED
Status: DISCONTINUED | OUTPATIENT
Start: 2021-08-06 | End: 2021-08-06

## 2021-08-06 RX ORDER — NICOTINE 21 MG/24HR
1 PATCH, TRANSDERMAL 24 HOURS TRANSDERMAL DAILY
Status: DISCONTINUED | OUTPATIENT
Start: 2021-08-06 | End: 2021-08-06

## 2021-08-06 RX ORDER — SODIUM CHLORIDE 9 MG/ML
150 INJECTION, SOLUTION INTRAVENOUS CONTINUOUS
Status: DISCONTINUED | OUTPATIENT
Start: 2021-08-06 | End: 2021-08-06

## 2021-08-06 RX ORDER — POLYETHYLENE GLYCOL 3350 17 G/17G
17 POWDER, FOR SOLUTION ORAL DAILY PRN
Status: DISCONTINUED | OUTPATIENT
Start: 2021-08-06 | End: 2021-08-09 | Stop reason: HOSPADM

## 2021-08-06 RX ORDER — ONDANSETRON 2 MG/ML
4 INJECTION INTRAMUSCULAR; INTRAVENOUS EVERY 6 HOURS PRN
Status: DISCONTINUED | OUTPATIENT
Start: 2021-08-06 | End: 2021-08-09 | Stop reason: HOSPADM

## 2021-08-06 RX ORDER — CIPROFLOXACIN 2 MG/ML
400 INJECTION, SOLUTION INTRAVENOUS EVERY 12 HOURS
Status: DISCONTINUED | OUTPATIENT
Start: 2021-08-06 | End: 2021-08-08

## 2021-08-06 RX ORDER — METRONIDAZOLE 500 MG/1
500 TABLET ORAL EVERY 8 HOURS SCHEDULED
Status: DISCONTINUED | OUTPATIENT
Start: 2021-08-06 | End: 2021-08-06

## 2021-08-06 RX ORDER — CIPROFLOXACIN 2 MG/ML
400 INJECTION, SOLUTION INTRAVENOUS EVERY 8 HOURS
Status: DISCONTINUED | OUTPATIENT
Start: 2021-08-06 | End: 2021-08-06

## 2021-08-06 RX ORDER — NICOTINE 21 MG/24HR
1 PATCH, TRANSDERMAL 24 HOURS TRANSDERMAL DAILY
Status: DISCONTINUED | OUTPATIENT
Start: 2021-08-06 | End: 2021-08-09 | Stop reason: HOSPADM

## 2021-08-06 RX ORDER — PANTOPRAZOLE SODIUM 40 MG/1
40 TABLET, DELAYED RELEASE ORAL
Status: DISCONTINUED | OUTPATIENT
Start: 2021-08-07 | End: 2021-08-09 | Stop reason: HOSPADM

## 2021-08-06 RX ADMIN — SODIUM CHLORIDE 500 ML: 0.9 INJECTION, SOLUTION INTRAVENOUS at 03:16

## 2021-08-06 RX ADMIN — METRONIDAZOLE 500 MG: 500 TABLET ORAL at 05:04

## 2021-08-06 RX ADMIN — CIPROFLOXACIN 400 MG: 2 INJECTION, SOLUTION INTRAVENOUS at 19:41

## 2021-08-06 RX ADMIN — GABAPENTIN 600 MG: 300 CAPSULE ORAL at 18:34

## 2021-08-06 RX ADMIN — SODIUM CHLORIDE 150 ML/HR: 0.9 INJECTION, SOLUTION INTRAVENOUS at 04:05

## 2021-08-06 RX ADMIN — INSULIN LISPRO 1 UNITS: 100 INJECTION, SOLUTION INTRAVENOUS; SUBCUTANEOUS at 14:49

## 2021-08-06 RX ADMIN — GABAPENTIN 600 MG: 300 CAPSULE ORAL at 09:54

## 2021-08-06 RX ADMIN — CIPROFLOXACIN 400 MG: 2 INJECTION, SOLUTION INTRAVENOUS at 06:11

## 2021-08-06 RX ADMIN — PANTOPRAZOLE SODIUM 40 MG: 40 INJECTION, POWDER, FOR SOLUTION INTRAVENOUS at 09:55

## 2021-08-06 RX ADMIN — NICOTINE 1 PATCH: 21 PATCH, EXTENDED RELEASE TRANSDERMAL at 09:55

## 2021-08-06 NOTE — CONSULTS
Consultation - [facility]  Zofia Watts 40 y o  male MRN: [id number]  Unit/Bed#: [facility] 605-01 Encounter: [id number]        Consults    Reason for Consult / Principal Problem:      coffee-ground emesis      ASSESSMENT AND PLAN:       Mr Blane Watson is a 40 year male with DM2 who presented to the ED with nausea vomiting and diarrhea after eating his salad  GI was consulted for coffee-ground emesis  Coffee-ground emesis  Patient had multiple episodes of vomiting and diarrhea after consumption of salad/mushrooms  He likely has viral gastroenteritis  His coffee-ground emesis could be explained by possible Le-Mehta tear secondary to multiple  episodes of vomiting   -  He was febrile on admission and tachycardic to 120s  His heart rate has improved with fluid resuscitation but continues to be mildly tachycardic  His blood pressures are stable  -   Labs were notable for leukocytosis ( WBC 13K),  Hemoglobin 14 3 ( above his baseline likely secondary to hemoconcentration)  He had an ZANDER with a creatinine to 1 3 ( up from baseline of 0 4 to 0 60)  His LFTs  are unremarkable  -  His hemoglobin is stable and he has had no further episodes of coffee-ground emesis  No plans for endoscopic evaluation at this time  Would recommend monitoring the hemoglobin and if he is to have further episodes we will plan on upper  Endoscopy  -   Recommend good control of nausea to avoid any further episodes of vomiting  Continue IV zofran prn   -  Okay to advance diet as tolerated  - He is currently on IV PPI b i d  If hemoglobin remains stable and patient has no further episodes of coffee-ground emesis recommend switching to po 40 mg once daily if patient having reflux symptoms or discontinuing on discharge if not needed  Gastroenteritis  -   CT A/P showing enterocolitis likely infectious  Related to enter toxin from contaminated food ingestion  -  ID consulted for colitis    He is currently on cipro and flagyl, Further management as per ID  -  Continue IV hydration and supportive management       Guillermo Yu MD   Gastroenterology Fellow     ______________________________________________________________________    HPI:       Mr Indu Garcia is a 40 year male with DM2 who presented to the ED with nausea vomiting and diarrhea after eating his salad  GI was consulted for coffee-ground emesis  Patient states that he had his salad yesterday afternoon and he added some mushrooms to it which had been lying in his refrigerator for the past few days  He thought that the mushrooms had a strange smell and texture so he decided  to stop eating  Shortly after he started having multiple episodes of vomiting  and diarrhea  He states after about 15 episodes he noticed that his vomitus was coffee-ground  and he has had about 3-4 episodes of coffee-ground emesis  On EMS arrival patient was hypotensive and bradycardic  He received epi with improvement in his blood pressure and heart rate  He states since he has been in the hospital he has had no further episodes of vomiting  His abdominal pain has resolved  Does have associated diarrhea  Has had multiple episodes since yesterday which are mostly watery  Denies melena or bright red blood per rectum  Patient denies alcohol use  He has not used any NSAIDs and is not on any antiplatelets or anticoagulants  REVIEW OF SYSTEMS:    CONSTITUTIONAL: Denies any fever, chills, rigors, and weight loss  HEENT: No earache or tinnitus  Denies hearing loss or visual disturbances  CARDIOVASCULAR: No chest pain or palpitations  RESPIRATORY: Denies any cough, hemoptysis, shortness of breath or dyspnea on exertion  GASTROINTESTINAL: As noted in the History of Present Illness  GENITOURINARY: No problems with urination  Denies any hematuria or dysuria  NEUROLOGIC: No dizziness or vertigo, denies headaches  MUSCULOSKELETAL: Denies any muscle or joint pain     SKIN: Denies skin rashes or itching  ENDOCRINE: Denies excessive thirst  Denies intolerance to heat or cold  PSYCHOSOCIAL: Denies depression or anxiety  Denies any recent memory loss  Historical Information   Past Medical History:   Diagnosis Date    Diabetes mellitus ([facility] [address] )     type 2    Hypertension     Psychiatric disorder     depression     Past Surgical History:   Procedure Laterality Date    CT GUIDED PERC DRAINAGE CATHETER PLACEMENT  3/4/2021    CYSTOSCOPY N/A 3/1/2021    Procedure: CYSTOSCOPY;  Surgeon: Robert Blanca MD;  [facility];  Service: Urology    CYSTOSCOPY      HAND DEBRIDEMENT Left 5/13/2021    Procedure: REMOVAL FOREIGN BODY EXTREMITY (left long finger); Surgeon: Martin Holguin MD;  Location[facility];  Service: Orthopedics    IR ASPIRATION ONLY  3/5/2021    IR DRAINAGE TUBE CHECK AND/OR REMOVAL  3/8/2021    TRANSURETHRAL RESECTION OF PROSTATE N/A 3/1/2021    Procedure: TRANSURETHRAL RESECTION OF URETHERAL PROSTATE ABSCESS(TURP);   Surgeon: Robert Blanca MD;  Location[facility];  Service: Urology     Social History   Social History     Substance and Sexual Activity   Alcohol Use Not Currently    Comment: occasionally     Social History     Substance and Sexual Activity   Drug Use No     Social History     Tobacco Use   Smoking Status Current Every Day Smoker    Packs/day: 0 50    Types: Cigarettes   Smokeless Tobacco Never Used   Tobacco Comment    cutting back      Family History   Problem Relation Age of Onset    Diabetes Mother     Diabetes Maternal Grandmother        Meds/Allergies     Medications Prior to Admission   Medication    B-D UF III MINI PEN NEEDLES 31G X 5 MM MISC    B-D ULTRAFINE III SHORT PEN 31G X 8 MM MISC    BD Pen Needle Tita U/F 32G X 4 MM MISC    Blood Glucose Monitoring Suppl (ACCU-CHEK GUIDE ME) w/Device KIT    citalopram (CeleXA) 20 mg tablet    gabapentin (NEURONTIN) 300 mg capsule    gabapentin (NEURONTIN) 600 MG tablet    glucose monitoring kit (FREESTYLE) monitoring kit    insulin glargine (LANTUS) 100 units/mL subcutaneous injection    insulin lispro (HumaLOG) 100 units/mL injection pen    insulin lispro (HumaLOG) 100 units/mL injection    Lancets (freestyle) lancets    Lantus SoloStar 100 units/mL injection pen    metFORMIN (GLUCOPHAGE) 1000 MG tablet    methocarbamol (ROBAXIN) 750 mg tablet    nicotine (NICODERM CQ) 21 mg/24 hr TD 24 hr patch    OneTouch Ultra test strip    polyethylene glycol (MIRALAX) 17 g packet    Sodium Chloride Flush (Normal Saline Flush) 0 9 % SOLN    traZODone (DESYREL) 50 mg tablet     Current Facility-Administered Medications   Medication Dose Route Frequency    acetaminophen (TYLENOL) tablet 650 mg  650 mg Oral Q6H PRN    ciprofloxacin (CIPRO) IVPB (premix in 5% dextrose) 400 mg 200 mL  400 mg Intravenous Q8H    gabapentin (NEURONTIN) capsule 600 mg  600 mg Oral BID    insulin lispro (HumaLOG) 100 units/mL subcutaneous injection 1-5 Units  1-5 Units Subcutaneous Q6H NAYELI    metroNIDAZOLE (FLAGYL) tablet 500 mg  500 mg Oral Q8H [address]    nicotine (NICODERM CQ) 21 mg/24 hr TD 24 hr patch 1 patch  1 patch Transdermal Daily    ondansetron (ZOFRAN) injection 4 mg  4 mg Intravenous Q6H PRN    pantoprazole (PROTONIX) injection 40 mg  40 mg Intravenous Q12H NAYELI    polyethylene glycol (MIRALAX) packet 17 g  17 g Oral Daily PRN    sodium chloride 0 9 % infusion  150 mL/hr Intravenous Continuous       Allergies   Allergen Reactions    Penicillins            Objective     Blood pressure 130/73, pulse (!) 108, temperature 98 4 °F (36 9 °C), resp  rate 18, weight 63 5 kg (140 lb), SpO2 96 %  Body mass index is 23 3 kg/m²        Intake/Output Summary (Last 24 hours) at 8/6/2021 1033  Last data filed at 8/6/2021 1670  Gross per 24 hour   Intake 2250 ml   Output --   Net 2250 ml         PHYSICAL EXAM:      General Appearance:   Alert, cooperative, no distress   HEENT:   Normocephalic, atraumatic, anicteric      Neck:  Supple, symmetrical, trachea midline   Lungs:   Clear to auscultation bilaterally; no rales, rhonchi or wheezing; respirations unlabored    Heart[de-identified]   Regular rate and rhythm; no murmur, rub, or gallop  Abdomen:   Soft, non-tender, non-distended; normal bowel sounds; no masses, no organomegaly    Genitalia:   Deferred    Rectal:   Deferred    Extremities:  No cyanosis, clubbing or edema    Pulses:  2+ and symmetric all extremities    Skin:  No jaundice, rashes, or lesions    Lymph nodes:  No palpable cervical lymphadenopathy        Lab Results:   Admission on 08/05/2021   Component Date Value    POC Glucose 08/05/2021 237*    WBC 08/05/2021 12 38*    RBC 08/05/2021 4 92     Hemoglobin 08/05/2021 14 3     Hematocrit 08/05/2021 42 5     MCV 08/05/2021 86     MCH 08/05/2021 29 1     MCHC 08/05/2021 33 6     RDW 08/05/2021 13 1     MPV 08/05/2021 9 4     Platelets 15/70/8087 279     nRBC 08/05/2021 0     Sodium 08/05/2021 141     Potassium 08/05/2021 4 1     Chloride 08/05/2021 108     CO2 08/05/2021 21     ANION GAP 08/05/2021 12     BUN 08/05/2021 19     Creatinine 08/05/2021 1 32*    Glucose 08/05/2021 248*    Calcium 08/05/2021 9 0     AST 08/05/2021 12     ALT 08/05/2021 21     Alkaline Phosphatase 08/05/2021 80     Total Protein 08/05/2021 7 3     Albumin 08/05/2021 3 8     Total Bilirubin 08/05/2021 0 33     eGFR 08/05/2021 65     Protime 08/05/2021 13 4     INR 08/05/2021 1 02     PTT 08/05/2021 24     Lipase 08/05/2021 90     ABO Grouping 08/05/2021 O     Rh Factor 08/05/2021 Positive     Antibody Screen 08/05/2021 Negative     Specimen Expiration Date 08/05/2021 [id number]     Blood Culture 08/05/2021 Received in Microbiology Lab  Culture in Progress   Blood Culture 08/05/2021 Received in Microbiology Lab  Culture in Progress       LACTIC ACID 08/05/2021 4 9*    Procalcitonin 08/05/2021 2 22*    ABO Grouping 08/05/2021 O     Rh Factor 08/05/2021 Positive     Segmented % 08/05/2021 41*    Bands % 08/05/2021 35*    Lymphocytes % 08/05/2021 4*    Monocytes % 08/05/2021 9     Eosinophils, % 08/05/2021 0     Basophils % 08/05/2021 1     Metamyelocytes% 08/05/2021 10*    Absolute Neutrophils 08/05/2021 9 41*    Lymphocytes Absolute 08/05/2021 0 50*    Monocytes Absolute 08/05/2021 1 11     Eosinophils Absolute 08/05/2021 0 00     Basophils Absolute 08/05/2021 0 12*    Smudge Cells 08/05/2021 Present     RBC Morphology 08/05/2021 Present     Anisocytosis 08/05/2021 Present     Microcytes 08/05/2021 Present     Polychromasia 08/05/2021 Present     Platelet Estimate 81/86/5355 Adequate     Giant PLTs 08/05/2021 Present     Procalcitonin 08/06/2021 54 54*    LACTIC ACID 08/05/2021 1 1     Hemoglobin A1C 08/06/2021 8 3*    EAG 08/06/2021 192     Sodium 08/06/2021 143     Potassium 08/06/2021 4 1     Chloride 08/06/2021 113*    CO2 08/06/2021 24     ANION GAP 08/06/2021 6     BUN 08/06/2021 14     Creatinine 08/06/2021 1 07     Glucose 08/06/2021 151*    Calcium 08/06/2021 8 1*    eGFR 08/06/2021 83     Total Bilirubin 08/06/2021 0 59     Bilirubin, Direct 08/06/2021 0 14     Alkaline Phosphatase 08/06/2021 59     AST 08/06/2021 12     ALT 08/06/2021 19     Total Protein 08/06/2021 6 5     Albumin 08/06/2021 3 2*    Magnesium 08/06/2021 1 9     Phosphorus 08/06/2021 4 2     WBC 08/06/2021 13 69*    RBC 08/06/2021 4 65     Hemoglobin 08/06/2021 13 3     Hematocrit 08/06/2021 40 1     MCV 08/06/2021 86     MCH 08/06/2021 28 6     MCHC 08/06/2021 33 2     RDW 08/06/2021 13 2     Platelets 07/59/7587 262     MPV 08/06/2021 9 2     Protime 08/06/2021 13 6     INR 08/06/2021 1 04     Ventricular Rate 08/05/2021 127     Atrial Rate 08/05/2021 127     NV Interval 08/05/2021 156     QRSD Interval 08/05/2021 90     QT Interval 08/05/2021 290     QTC Interval 08/05/2021 421     P Axis 08/05/2021 61     QRS Axis 08/05/2021 75     T Wave Axis 08/05/2021 53        Imaging Studies: I have personally reviewed pertinent imaging studies

## 2021-08-06 NOTE — ASSESSMENT & PLAN NOTE
Creatinine went up to 1 32 from 0 54 on March 9 of 2021  Will hold metformin  Avoid hypotension, nephrotoxic drugs  Continue IV hydration overnight  Repeat BMP in a m

## 2021-08-06 NOTE — H&P
[address] [facility] [place] 1975, 40 y o  male MRN: [id number]  Unit/Bed#: ED 21 Encounter: [id number]  Primary Care Provider: Mago Foley DO   Date and time admitted to hospital: 8/5/2021  7:49 PM    * Severe sepsis [facility])  Assessment & Plan  Patient with positive sepsis criteria of heart rate 125 ,temperature maximum 38 3C ,white blood cell count of 12 3 K and lactic acid 4 9  POA  He endorses few times of hematemesis today  Denied manuel abdominal pain, diarrhea or any other associated symptoms  CT of abdomen and pelvis reported "Findings  Are compatible with infectious versus inflammatory enterocolitis  Fluid-filled large bowel loops consistent with diarrheal illness  Multiple dilated small bowel loops in the left upper quadrant with air-fluid levels and without discrete transition point likely reflecting secondary ileus versus obstruction "  Patient received ceftriaxone and Flagyl, versus sedating fluids  Procalcitonin level 2 22  Lactic acid dropped to 1 1 with IV hydration  Will continue antibiotic for enteritis, patient also started on PPI IV   Continue antiemetic  Keep NPO until GI evaluation  ID consult placed as well           Hematemesis  Assessment & Plan  Management as above    ZANDER (acute kidney injury) ([facility] [address] )  Assessment & Plan  Creatinine went up to 1 32 from 0 54 on March 9 of 2021  Will hold metformin  Avoid hypotension, nephrotoxic drugs  Continue IV hydration overnight  Repeat BMP in a m      Type 2 diabetes mellitus with hyperglycemia, with long-term current use of insulin [facility])  Assessment & Plan  Lab Results   Component Value Date    HGBA1C >14 0 (H) 03/01/2021       Recent Labs     08/05/21 1956   POCGLU 237*       Blood Sugar Average: Last 72 hrs:  (P) 237     Poorly-controlled diabetes most likely secondary to noncompliance  Will hold oral hypoglycemic  Continue Accu-Cheks and insulin sliding scale    Smoking  Assessment & Plan  Smoking cessation and nicotine patch ordered    VTE Pharmacologic Prophylaxis: VTE Score: 2 Low Risk (Score 0-2) - Encourage Ambulation  Code Status: Level 1 - Full Code     Anticipated Length of Stay: Patient will be admitted on an inpatient basis with an anticipated length of stay of greater than 2 midnights secondary to Sepsis  consult IV hydration  Total Time for Visit, including Counseling / Coordination of Care: 45 minutes Greater than 50% of this total time spent on direct patient counseling and coordination of care  Chief Complaint:  Vomiting blood    History of Present Illness:  Charley Kehr is a 40 y o  male with a PMH of poorly control diabetes with most recent hemoglobin A1c 14 who presented to the emergency room for evaluation of hematemesis and abdominal pain  Upon my assessment patient confirmed having few episodes of coffee-ground emesis but denied diarrhea and abdominal pain  He reported ER that symptoms started 1 1/2  hr after ingestion of mushrooms from [employer] that patient had with dinner  He also had several episodes of diarrhea  Arrived EMS also documented that patient syncopized and was hypotensive and bradycardic  EMS administered 20 mcg of epi with improvement of blood pressure and heart rate  His heart rate 125 and temperature 38 3° present on admission he also found to have a leukocytosis of 12 3 K and lactic acid 4 9 with evidence of enterocolitis on CT of abdomen  Hemoglobin stable  Patient was given broad-spectrum antibiotic after sepsis workup completed  His heart rate went down from 130 to 80 with 2 L of normal saline  Lactic acid dropped to 1 1  Patient admitted to the hospitalist service on inpatient basis   he remains hemodynamically stable             Review of Systems:  Review of Systems   Constitutional: Positive for chills and fever  Gastrointestinal: Positive for vomiting  All other systems reviewed and are negative        Past Medical and Surgical History:   Past Medical History:   Diagnosis Date    Diabetes mellitus ([facility] [address] )     type 2    Hypertension     Psychiatric disorder     depression       Past Surgical History:   Procedure Laterality Date    CT GUIDED PERC DRAINAGE CATHETER PLACEMENT  3/4/2021    CYSTOSCOPY N/A 3/1/2021    Procedure: CYSTOSCOPY;  Surgeon: Luisa Holcomb MD;  [facility];  Service: Urology    CYSTOSCOPY      HAND DEBRIDEMENT Left 5/13/2021    Procedure: REMOVAL FOREIGN BODY EXTREMITY (left long finger); Surgeon: Asia Chaves MD;  Location: [facility];  Service: Orthopedics    IR ASPIRATION ONLY  3/5/2021    IR DRAINAGE TUBE CHECK AND/OR REMOVAL  3/8/2021    TRANSURETHRAL RESECTION OF PROSTATE N/A 3/1/2021    Procedure: TRANSURETHRAL RESECTION OF URETHERAL PROSTATE ABSCESS(TURP); Surgeon: Luisa Holcomb MD;  Location: [facility];  Service: Urology       Meds/Allergies:  Prior to Admission medications    Medication Sig Start Date End Date Taking?  Authorizing Provider   GIUSEPPE UF III MINI PEN NEEDLES 31G X 5 MM MISC  10/23/19   Historical Provider, MD CHIN ULTRAFINE III SHORT PEN 31G X 8 MM MISC  10/22/19   Historical Provider, MD   BD Pen Needle Tita U/F 32G X 4 MM MISC  3/9/21   Historical Provider, MD   Blood Glucose Monitoring Suppl (ACCU-CHEK GUIDE ME) w/Device KIT  10/31/19   Historical Provider, MD   citalopram (CeleXA) 20 mg tablet  10/26/19   Historical Provider, MD   gabapentin (NEURONTIN) 300 mg capsule Take 1 capsule (300 mg total) by mouth 3 (three) times a day 3/9/21   Gwen Cadet MD   gabapentin (NEURONTIN) 600 MG tablet Take 600 mg by mouth 2 (two) times a day 4/9/21   Historical Provider, MD   glucose monitoring kit (FREESTYLE) monitoring kit Use 1 each 4 (four) times a day 3/9/21   Gwen Cadet MD   insulin glargine (LANTUS) 100 units/mL subcutaneous injection Inject 45 Units under the skin daily at bedtime 3/9/21   Gwen Cadet MD   insulin lispro (HumaLOG) 100 units/mL injection pen INJECT 15 UNITS 3 TIMES A DAY BY SUBCUTANEOUS ROUTE WITH MEALS FOR 30 DAYS 4/13/21   Historical Provider, MD   insulin lispro (HumaLOG) 100 units/mL injection Inject 15 Units under the skin 3 (three) times a day with meals 3/9/21 5/12/21  Emerson Blood MD   Lancets (freestyle) lancets  3/9/21   Historical Provider, MD   Lanaugustus SoloStar 100 units/mL injection pen inject 45 units subcutaneously once daily [id number] 4/8/21   Historical Provider, MD   metFORMIN (GLUCOPHAGE) 1000 MG tablet Take 1,000 mg by mouth 2 (two) times a day    Historical Provider, MD   methocarbamol (ROBAXIN) 750 mg tablet Take 750 mg by mouth every 6 (six) hours as needed 12/7/20   Historical Provider, MD   nicotine (NICODERM CQ) 21 mg/24 hr TD 24 hr patch Place 1 patch on the skin daily 3/9/21   Emerson Blood MD   OneTouch Ultra test strip 4 (four) times a day Test blood sugar 4/7/21   Historical Provider, MD   polyethylene glycol (MIRALAX) 17 g packet Take 17 g by mouth 2 (two) times a day before meals for 7 days 3/9/21 3/16/21  Emerson Blood MD   Sodium Chloride Flush (Normal Saline Flush) 0 9 % SOLN  3/4/21   Historical Provider, MD   traZODone (DESYREL) 50 mg tablet Take 50 mg by mouth daily at bedtime 3/29/21   Historical Provider, MD     I have reviewed home medications using recent Epic encounter  Allergies:    Allergies   Allergen Reactions    Penicillins        Social History:  Marital Status: Single   Occupation: none  Patient Pre-hospital Living Situation: Home  Patient Pre-hospital Level of Mobility: walks  Patient Pre-hospital Diet Restrictions:reg  Substance Use History:   Social History     Substance and Sexual Activity   Alcohol Use Not Currently    Comment: occasionally     Social History     Tobacco Use   Smoking Status Current Every Day Smoker    Packs/day: 0 50    Types: Cigarettes   Smokeless Tobacco Never Used   Tobacco Comment    cutting back      Social History Substance and Sexual Activity   Drug Use No       Family History:  Family History   Problem Relation Age of Onset    Diabetes Mother     Diabetes Maternal Grandmother        Physical Exam:     Vitals:   Blood Pressure: 114/69 (08/06/21 0100)  Pulse: (!) 116 (08/06/21 0100)  Temperature: (!) 101 °F (38 3 °C) (08/05/21 2027)  Temp Source: Rectal (08/05/21 2027)  Respirations: 19 (08/06/21 0100)  Weight - Scale: 63 5 kg (140 lb) (08/05/21 1952)  SpO2: 96 % (08/06/21 0100)    Physical Exam  Constitutional:       Appearance: He is ill-appearing  HENT:      Head: Normocephalic  Mouth/Throat:      Mouth: Mucous membranes are dry  Cardiovascular:      Rate and Rhythm: Normal rate  Pulses: Normal pulses  Abdominal:      General: There is no distension  Tenderness: There is abdominal tenderness  Musculoskeletal:         General: No swelling  Cervical back: Normal range of motion  Skin:     General: Skin is warm  Neurological:      General: No focal deficit present     Psychiatric:         Mood and Affect: Mood normal           Additional Data:     Lab Results:  Results from last 7 days   Lab Units 08/05/21 2032   WBC Thousand/uL 12 38*   HEMOGLOBIN g/dL 14 3   HEMATOCRIT % 42 5   PLATELETS Thousands/uL 279   BANDS PCT % 35*   LYMPHO PCT % 4*   MONO PCT % 9   EOS PCT % 0     Results from last 7 days   Lab Units 08/05/21 2032   SODIUM mmol/L 141   POTASSIUM mmol/L 4 1   CHLORIDE mmol/L 108   CO2 mmol/L 21   BUN mg/dL 19   CREATININE mg/dL 1 32*   ANION GAP mmol/L 12   CALCIUM mg/dL 9 0   ALBUMIN g/dL 3 8   TOTAL BILIRUBIN mg/dL 0 33   ALK PHOS U/L 80   ALT U/L 21   AST U/L 12   GLUCOSE RANDOM mg/dL 248*     Results from last 7 days   Lab Units 08/05/21 2032   INR  1 02     Results from last 7 days   Lab Units 08/05/21 1956   POC GLUCOSE mg/dl 237*         Results from last 7 days   Lab Units 08/05/21 2210 08/05/21 2042   LACTIC ACID mmol/L 1 1 4 9*   PROCALCITONIN ng/ml  --  2 22* Imaging: Reviewed radiology reports from this admission including: abdominal/pelvic CT  CT abdomen pelvis with contrast   Final Result by Dagmar Jon MD (08/05 1793)      Findings as above compatible with infectious versus inflammatory enterocolitis  Fluid-filled large bowel loops consistent with diarrheal illness  Multiple dilated small bowel loops in the left upper quadrant with air-fluid levels and without discrete transition point likely reflecting secondary ileus versus obstruction  Urinary bladder wall thickening and mucosal hyperenhancement  Correlate for infectious etiology  Workstation performed: [id number]         XR chest 1 view portable   ED Interpretation by Blessing Wyatt DO (08/05 5104)   No acute infiltrates, no free air as read by me          EKG and Other Studies Reviewed on Admission:   · EKG:  Sinus tachycardia    ** Please Note: This note has been constructed using a voice recognition system   **

## 2021-08-06 NOTE — PROGRESS NOTES
[address] [facility]  Progress Note Jose Arellano 1975, 40 y o  male MRN: [id number]  Unit/Bed#: [facility] 605-01 Encounter: [id number]  Primary Care Provider: Cyn Bess DO   Date and time admitted to hospital: 8/5/2021  7:49 PM    * Severe sepsis [facility])  Assessment & Plan  · Patient with positive sepsis criteria of heart rate 125 ,temperature maximum 38 3C ,white blood cell count of 12 3 K and lactic acid 4 9  POA  · He endorses few times of hematemesis  Denied manuel abdominal pain, diarrhea or any other associated symptoms on admission   · However today pt reports diarrhea   · CT of abdomen and pelvis reported "Findings  Are compatible with infectious versus inflammatory enterocolitis  Fluid-filled large bowel loops consistent with diarrheal illness  Multiple dilated small bowel loops in the left upper quadrant with air-fluid levels and without discrete transition point likely reflecting secondary ileus versus obstruction "  · Patient received ceftriaxone and Flagyl, per ID discontinue Flagyl and ceftriaxone initiated Cipro - to cover for salmonella   · Procalcitonin level 2 22, bumped up to 54 54  · Lactic acid dropped to 1 1 with IV hydration    · Will continue antibiotic for enteritis, patient also started on PPI IV transition to po   · Continue antiemetic  · Appreciate GI, recommend clear liquids today increased to regular diet this evening  - pt reports he feels hungry   · Appreciate ID       Type 2 diabetes mellitus with hyperglycemia, with long-term current use of insulin [facility])  Assessment & Plan  Lab Results   Component Value Date    HGBA1C 8 3 (H) 08/06/2021       Recent Labs     08/05/21 1956 08/06/21  1156   POCGLU 237* 151*       Blood Sugar Average: Last 72 hrs:  (P) 194     · Poorly-controlled diabetes most likely secondary to noncompliance  · Will hold oral hypoglycemic  · Continue Accu-Cheks and insulin sliding scale  · Has been on clear liquids all day until seen by GI      ZANDER (acute kidney injury) ([facility] [address] )  Assessment & Plan  · Creatinine went up to 1 32 from 0 54 on   · Will hold metformin  · Avoid hypotension, nephrotoxic drugs  · Discontinue IV fluids  · Repeat BMP in a m , improving    Hematemesis  Assessment & Plan  · Management as above  · No further episodes since admission  · Hemoglobin remains stable  · Check stat hemoglobin now  · Check labs in a m , medically stable can discharge    Smoking  Assessment & Plan  · Smoking cessation and nicotine patch ordered        VTE Pharmacologic Prophylaxis: VTE Score: 2 High Risk (Score >/= 5) - Pharmacological DVT Prophylaxis Contraindicated  Sequential Compression Devices Ordered  Patient Centered Rounds: I performed bedside rounds with nursing staff today  Discussions with Specialists or Other Care Team Provider: nursing     Education and Discussions with Family / Patient: patient   Time Spent for Care: 30 minutes  More than 50% of total time spent on counseling and coordination of care as described above  Current Length of Stay: 0 day(s)  Current Patient Status: Inpatient   Certification Statement: The patient will continue to require additional inpatient hospital stay due to observe hh  Discharge Plan: Anticipate discharge tomorrow to home  Code Status: Level 1 - Full Code    Subjective:   Pt denies any abd pain no nausea , no vomiting couple episodes of diarrhea today  Feeling hungry     Objective:     Vitals:   Temp (24hrs), Av 5 °F (36 9 °C), Min:98 °F (36 7 °C), Max:99 6 °F (37 6 °C)    Temp:  [98 °F (36 7 °C)-99 6 °F (37 6 °C)] 98 1 °F (36 7 °C)  HR:  [100-122] 100  Resp:  [16-20] 19  BP: (108-130)/(58-78) 126/74  SpO2:  [96 %-97 %] 96 %  Body mass index is 23 3 kg/m²  Input and Output Summary (last 24 hours):      Intake/Output Summary (Last 24 hours) at 2021  Last data filed at 2021  Gross per 24 hour   Intake 2720 ml   Output --   Net 2720 ml       Physical Exam: Physical Exam  Constitutional:       General: He is not in acute distress  Appearance: He is not ill-appearing, toxic-appearing or diaphoretic  HENT:      Head: Normocephalic and atraumatic  Mouth/Throat:      Pharynx: Oropharynx is clear  Eyes:      General:         Right eye: No discharge  Left eye: No discharge  Conjunctiva/sclera: Conjunctivae normal    Cardiovascular:      Rate and Rhythm: Normal rate  Heart sounds: No murmur heard  No friction rub  No gallop  Pulmonary:      Effort: No respiratory distress  Breath sounds: No stridor  No wheezing, rhonchi or rales  Chest:      Chest wall: No tenderness  Abdominal:      General: There is no distension  Palpations: There is no mass  Tenderness: There is no abdominal tenderness  There is no right CVA tenderness, left CVA tenderness, guarding or rebound  Hernia: No hernia is present  Musculoskeletal:         General: No swelling, tenderness, deformity or signs of injury  Right lower leg: No edema  Left lower leg: No edema  Skin:     Coloration: Skin is not jaundiced  Findings: No bruising, erythema, lesion or rash  Neurological:      Mental Status: He is alert and oriented to person, place, and time     Psychiatric:         Behavior: Behavior normal          Additional Data:     Labs:  Results from last 7 days   Lab Units 08/06/21  2030 08/05/21  2032   WBC Thousand/uL 12 06* 12 38*   HEMOGLOBIN g/dL 12 4 14 3   HEMATOCRIT % 36 5 42 5   PLATELETS Thousands/uL 249 279   BANDS PCT %  --  35*   LYMPHO PCT %  --  4*   MONO PCT %  --  9   EOS PCT %  --  0     Results from last 7 days   Lab Units 08/06/21  0500   SODIUM mmol/L 143   POTASSIUM mmol/L 4 1   CHLORIDE mmol/L 113*   CO2 mmol/L 24   BUN mg/dL 14   CREATININE mg/dL 1 07   ANION GAP mmol/L 6   CALCIUM mg/dL 8 1*   ALBUMIN g/dL 3 2*   TOTAL BILIRUBIN mg/dL 0 59   ALK PHOS U/L 59   ALT U/L 19   AST U/L 12   GLUCOSE RANDOM mg/dL 151* Results from last 7 days   Lab Units 08/06/21  0500   INR  1 04     Results from last 7 days   Lab Units 08/06/21  1831 08/06/21  1156 08/05/21  1956   POC GLUCOSE mg/dl 95 151* 237*     Results from last 7 days   Lab Units 08/06/21  0500   HEMOGLOBIN A1C % 8 3*     Results from last 7 days   Lab Units 08/06/21  0500 08/05/21  2210 08/05/21  2042   LACTIC ACID mmol/L  --  1 1 4 9*   PROCALCITONIN ng/ml 54 54*  --  2 22*       Lines/Drains:  Invasive Devices     Peripheral Intravenous Line            Peripheral IV Dorsal (posterior); Left Forearm -- days    Peripheral IV 08/05/21 Right Antecubital 1 day                  Telemetry:    Telemetry Reviewed: Normal Sinus Rhythm occ tachy  Indication for Continued Telemetry Use: No indication for continued use  Will discontinue  Imaging: Reviewed radiology reports from this admission including: abdominal/pelvic CT    Recent Cultures (last 7 days):   Results from last 7 days   Lab Units 08/05/21 2042   BLOOD CULTURE  Received in Microbiology Lab  Culture in Progress  Received in Microbiology Lab  Culture in Progress  Last 24 Hours Medication List:   Current Facility-Administered Medications   Medication Dose Route Frequency Provider Last Rate    acetaminophen  650 mg Oral Q6H PRN Lauro Osler, MD      ciprofloxacin  400 mg Intravenous Q12H Kiara Logan MD Stopped (08/06/21 2041)    gabapentin  600 mg Oral BID Lauro Osler, MD      insulin lispro  1-5 Units Subcutaneous Q6H [address] Lauro Osler, MD      nicotine  1 patch Transdermal Daily Lauro Osler, MD      ondansetron  4 mg Intravenous Q6H PRN Lauro Osler, MD      [START ON 8/7/2021] pantoprazole  40 mg Oral Early Morning VENKAT Del Toro      polyethylene glycol  17 g Oral Daily PRN Lauro Osler, MD          Today, Patient Was Seen By: VENKAT Del Toro    **Please Note: This note may have been constructed using a voice recognition system  **

## 2021-08-06 NOTE — ED NOTES
Patient transported to [facility], [address] [facility]  08/05/21 8657

## 2021-08-06 NOTE — ASSESSMENT & PLAN NOTE
Lab Results   Component Value Date    HGBA1C >14 0 (H) 03/01/2021       Recent Labs     08/05/21 1956   POCGLU 237*       Blood Sugar Average: Last 72 hrs:  (P) 237     Poorly-controlled diabetes most likely secondary to noncompliance  Will hold oral hypoglycemic  Continue Accu-Cheks and insulin sliding scale

## 2021-08-06 NOTE — ASSESSMENT & PLAN NOTE
Patient with positive sepsis criteria of heart rate 125 ,temperature maximum 38 3C ,white blood cell count of 12 3 K and lactic acid 4 9  POA  He endorses few times of hematemesis today  Denied manuel abdominal pain, diarrhea or any other associated symptoms  CT of abdomen and pelvis reported "Findings  Are compatible with infectious versus inflammatory enterocolitis  Fluid-filled large bowel loops consistent with diarrheal illness  Multiple dilated small bowel loops in the left upper quadrant with air-fluid levels and without discrete transition point likely reflecting secondary ileus versus obstruction "  Patient received ceftriaxone and Flagyl, versus sedating fluids  Procalcitonin level 2 22  Lactic acid dropped to 1 1 with IV hydration    Will continue antibiotic for enteritis, patient also started on PPI IV   Continue antiemetic  Keep NPO until GI evaluation  ID consult placed as well

## 2021-08-06 NOTE — PLAN OF CARE
Problem: Potential for Falls  Goal: Patient will remain free of falls  Description: INTERVENTIONS:  - Educate patient/family on patient safety including physical limitations  - Instruct patient to call for assistance with activity   - Consult OT/PT to assist with strengthening/mobility   - Keep Call bell within reach  - Keep bed low and locked with side rails adjusted as appropriate  - Keep care items and personal belongings within reach  - Initiate and maintain comfort rounds  - Make Fall Risk Sign visible to staff  - Offer Toileting every  Hours, in advance of need  - Initiate/Maintain alarm  - Obtain necessary fall risk management equipment:   - Apply yellow socks and bracelet for high fall risk patients  - Consider moving patient to room near nurses station  Outcome: Progressing     Problem: GASTROINTESTINAL - ADULT  Goal: Minimal or absence of nausea and/or vomiting  Description: INTERVENTIONS:  - Administer IV fluids if ordered to ensure adequate hydration  - Maintain NPO status until nausea and vomiting are resolved  - Nasogastric tube if ordered  - Administer ordered antiemetic medications as needed  - Provide nonpharmacologic comfort measures as appropriate  - Advance diet as tolerated, if ordered  - Consider nutrition services referral to assist patient with adequate nutrition and appropriate food choices  Outcome: Progressing  Goal: Maintains or returns to baseline bowel function  Description: INTERVENTIONS:  - Assess bowel function  - Encourage oral fluids to ensure adequate hydration  - Administer IV fluids if ordered to ensure adequate hydration  - Administer ordered medications as needed  - Encourage mobilization and activity  - Consider nutritional services referral to assist patient with adequate nutrition and appropriate food choices  Outcome: Progressing     Problem: SKIN/TISSUE INTEGRITY - ADULT  Goal: Skin Integrity remains intact(Skin Breakdown Prevention)  Description: Assess:  -Perform Jose assessment every  -Clean and moisturize skin every   -Inspect skin when repositioning, toileting, and assisting with ADLS  -Assess under medical devices such as every   -Assess extremities for adequate circulation and sensation     Bed Management:  -Have minimal linens on bed & keep smooth, unwrinkled  -Change linens as needed when moist or perspiring  -Avoid sitting or lying in one position for more than hours while in bed  -Keep HOB at degrees     Toileting:  -Offer bedside commode  -Assess for incontinence every   -Use incontinent care products after each incontinent episode such as    Activity:  -Mobilize patient  times a day  -Encourage activity and walks on unit  -Encourage or provide ROM exercises   -Turn and reposition patient every  Hours  -Use appropriate equipment to lift or move patient in bed  -Instruct/ Assist with weight shifting every when out of bed in chair  -Consider limitation of chair time  hour intervals    Skin Care:  -Avoid use of baby powder, tape, friction and shearing, hot water or constrictive clothing  -Relieve pressure over bony prominences using   -Do not massage red bony areas    Next Steps:  -Teach patient strategies to minimize risks such as   -Consider consults to  interdisciplinary teams such as   Outcome: Progressing     Problem: PAIN - ADULT  Goal: Verbalizes/displays adequate comfort level or baseline comfort level  Description: Interventions:  - Encourage patient to monitor pain and request assistance  - Assess pain using appropriate pain scale  - Administer analgesics based on type and severity of pain and evaluate response  - Implement non-pharmacological measures as appropriate and evaluate response  - Consider cultural and social influences on pain and pain management  - Notify physician/advanced practitioner if interventions unsuccessful or patient reports new pain  Outcome: Progressing     Problem: SAFETY ADULT  Goal: Patient will remain free of falls  Description: INTERVENTIONS:  - Educate patient/family on patient safety including physical limitations  - Instruct patient to call for assistance with activity   - Consult OT/PT to assist with strengthening/mobility   - Keep Call bell within reach  - Keep bed low and locked with side rails adjusted as appropriate  - Keep care items and personal belongings within reach  - Initiate and maintain comfort rounds  - Make Fall Risk Sign visible to staff  - Offer Toileting every  Hours, in advance of need  - Initiate/Maintain alarm  - Obtain necessary fall risk management equipment:  - Apply yellow socks and bracelet for high fall risk patients  - Consider moving patient to room near nurses station  Outcome: Progressing

## 2021-08-06 NOTE — ASSESSMENT & PLAN NOTE
· Creatinine went up to 1 32 from 0 54 on March 9 of 2021    · Will hold metformin  · Avoid hypotension, nephrotoxic drugs  · Discontinue IV fluids  · Repeat BMP in a m , improving

## 2021-08-06 NOTE — ASSESSMENT & PLAN NOTE
Lab Results   Component Value Date    HGBA1C 8 3 (H) 08/06/2021       Recent Labs     08/05/21 1956 08/06/21  1156   POCGLU 237* 151*       Blood Sugar Average: Last 72 hrs:  (P) 194     · Poorly-controlled diabetes most likely secondary to noncompliance  · Will hold oral hypoglycemic  · Continue Accu-Cheks and insulin sliding scale  · Has been on clear liquids all day until seen by GI

## 2021-08-06 NOTE — CASE MANAGEMENT
Met with patient, discussed CM role, CM program  LOS 1d  Bundle no  Unplanned readmit risk color green  30 day readmit no  Lives in 2nd floor apt with a friend, 3 steps to enter home, 15 steps to get to apartment, where everything is located on that floor  IADLS, ambulation, employed as , drives self  Denies Susu Grey, IP rehab, or OP therapy in past   No DME in home  Denies MH illness, D or A abuse  PCP [id number] [place] [address]  Primary contact Richter Price [phone]   Denies POA or LW, will not give another designee is he were unable to make medical decisions  May need transport on DC    CM reviewed d/c planning process including the following: identifying help at home, patient preference for d/c planning needs, Discharge Lounge, [facility] Meds to Bed program, availability of treatment team to discuss questions or concerns patient and/or family may have regarding understanding medications and recognizing signs and symptoms once discharged  CM also encouraged patient to follow up with all recommended appointments after discharge  Patient advised of importance for patient and family to participate in managing patients medical well being

## 2021-08-06 NOTE — ED CARE HANDOFF
Emergency Department Sign Out Note        Sign out and transfer of care from Dr Arvind De Jesus  See Separate Emergency Department note  The patient, Zayra Esteban, was evaluated by the previous provider for nausea, vomiting, hematemesis diarrhea  Workup Completed:  Sepsis workup    ED Course / Workup Pending (followup):  [ ] CT scan                Sepsis Reassessment      Most Recent Value   Volume Status and Tissue Perfusion Post Fluid Resuscitation * Must Document All *   Vital Signs Reviewed (HR, RR, BP, T)  Yes Filed at: 08/05/2021 2321   Shock Index Reviewed  --   Arterial Oxygen Saturation Reviewed (POx, SaO2 or SpO2)  --   Cardio  (!) Normal S1/S2, Tachycardia Filed at: 08/05/2021 2321   Pulmonary  Normal effort, Clear to auscultation Filed at: 08/05/2021 2321   Capillary Refill  Brisk Filed at: 08/05/2021 2321   Peripheral Pulses  Radial Filed at: 08/05/2021 2321   Peripheral Pulse  +2 Filed at: 08/05/2021 2321   Dorsalis Pedis  --   Posterior Tibialis  --   Skin  Warm, Dry, Normal Filed at: 08/05/2021 2321   Urine output assessed  --   *OR*   Intensive Monitoring- Must Document One of the Following Four *:   Vital Signs Reviewed  --   * Central Venous Pressure (CVP or RAP)  --   * Central Venous Oxygen (SVO2, ScvO2 or Oxygen saturation via central catheter)  --   * Bedside Cardiovascular US in IVC diameter and % collapse  --   CO  --   EF  --   IVC Diameter  --   % Collapse  --   * Passive Leg Raise OR Crystalloid Challenge  --   Passive leg exam  --   Crystalloid fluid challenge completed  --                        ED Course as of Aug 06 0436   Thu Aug 05, 2021   2318 39 yo M vomiting blood,       Fri Aug 06, 2021   Fiorella Mcmillan from [facility] for admission        Procedures  MDM  Number of Diagnoses or Management Options  Colitis  Hematemesis  Sepsis [facility])  Diagnosis management comments: 43-year-old male presenting today for hematemesis, abdominal pain, vomiting diarrhea    Patient was determined to have a high likelihood of infection and so started on sepsis protocol  Patient is pending CT abdomen pelvis the time of my assessment  CT abdomen pelvis shows enterocolitis verses possible obstruction  Patient has a reassuring abdominal examination  No tenderness, guarding, rebound  Patient says he is not currently having any pain  He has not had been having any vomiting for several hours  Believe obstruction is very unlikely  Will admit to medicine     Patient was reassessed  Patient was updated with information regarding the tests/imaging done today  I answered all of the patient's and/or family's questions  Patient and/or family vocalizes understanding  After discussion and given the test results, the patient will be admitted to the hospital  Patient and/or family are agreeable to the plan   They will be admitted to [facility] who will further manage their care in the hospital            Amount and/or Complexity of Data Reviewed  Clinical lab tests: reviewed  Tests in the radiology section of CPT®: reviewed  Tests in the medicine section of CPT®: reviewed  Review and summarize past medical records: yes  Independent visualization of images, tracings, or specimens: yes        Disposition  Final diagnoses:   Hematemesis   Sepsis ([facility] )   Colitis     Time reflects when diagnosis was documented in both MDM as applicable and the Disposition within this note     Time User Action Codes Description Comment    8/6/2021 12:55 AM Diana Perry Add [K92 0] Hematemesis     8/6/2021 12:55 AM Caden Zhang Add [A41 9] Sepsis ([facility] )     8/6/2021 12:55 AM Diana Perry Modify [K92 0] Hematemesis     8/6/2021 12:55 AM Caden Smith Modify [A41 9] Sepsis ([facility] )     8/6/2021 12:55 AM Diana Perry Add [K52 9] Colitis     8/6/2021  1:59 AM Chris Squibb Modify [K92 0] Hematemesis       ED Disposition     ED Disposition Condition Date/Time Comment    Admit Stable Fri Aug 6, 2021 12:55 AM Discussed with MARRY and agreed to inpatient to   [facility]      Follow-up Information    None       Current Discharge Medication List      CONTINUE these medications which have NOT CHANGED    Details   ! ! B-D UF III MINI PEN NEEDLES 31G X 5 MM MISC       !! B-D ULTRAFINE III SHORT PEN 31G X 8 MM MISC       !!  BD Pen Needle Tita U/F 32G X 4 MM MISC       Blood Glucose Monitoring Suppl (ACCU-CHEK GUIDE ME) w/Device KIT       citalopram (CeleXA) 20 mg tablet       gabapentin (NEURONTIN) 300 mg capsule Take 1 capsule (300 mg total) by mouth 3 (three) times a day  Qty: 90 capsule, Refills: 0    Associated Diagnoses: Type 2 diabetes mellitus with hyperglycemia, with long-term current use of insulin (HCC)      gabapentin (NEURONTIN) 600 MG tablet Take 600 mg by mouth 2 (two) times a day      glucose monitoring kit (FREESTYLE) monitoring kit Use 1 each 4 (four) times a day  Qty: 1 each, Refills: 0    Associated Diagnoses: Type 2 diabetes mellitus with hyperglycemia, with long-term current use of insulin ([facility])      insulin glargine (LANTUS) 100 units/mL subcutaneous injection Inject 45 Units under the skin daily at bedtime  Qty: 10 mL, Refills: 0    Associated Diagnoses: Type 2 diabetes mellitus with hyperglycemia, with long-term current use of insulin ([facility])      insulin lispro (HumaLOG) 100 units/mL injection pen INJECT 15 UNITS 3 TIMES A DAY BY SUBCUTANEOUS ROUTE WITH MEALS FOR 30 DAYS      insulin lispro (HumaLOG) 100 units/mL injection Inject 15 Units under the skin 3 (three) times a day with meals  Qty: 10 mL, Refills: 0    Associated Diagnoses: Type 2 diabetes mellitus with hyperglycemia, with long-term current use of insulin ([facility])      Lancets (freestyle) lancets       Lantus SoloStar 100 units/mL injection pen inject 45 units subcutaneously once daily [id number]      metFORMIN (GLUCOPHAGE) 1000 MG tablet Take 1,000 mg by mouth 2 (two) times a day      methocarbamol (ROBAXIN) 750 mg tablet Take 750 mg by mouth every 6 (six) hours as needed      nicotine (NICODERM CQ) 21 mg/24 hr TD 24 hr patch Place 1 patch on the skin daily  Qty: 28 patch, Refills: 0    Associated Diagnoses: Tobacco abuse      OneTouch Ultra test strip 4 (four) times a day Test blood sugar      polyethylene glycol (MIRALAX) 17 g packet Take 17 g by mouth 2 (two) times a day before meals for 7 days  Qty: 30 g, Refills: 0    Associated Diagnoses: Renal abscess      Sodium Chloride Flush (Normal Saline Flush) 0 9 % SOLN       traZODone (DESYREL) 50 mg tablet Take 50 mg by mouth daily at bedtime       !! - Potential duplicate medications found  Please discuss with provider  No discharge procedures on file         ED Provider  Electronically Signed by     Wan Arana MD  08/06/21 0948

## 2021-08-06 NOTE — CONSULTS
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  Given his fever, hypotension, and bandemia, agree with continuing antibiotics at this time while awaiting cultures   -continue supportive care with IVF   -will stop metronidazole   -continue IV ciprofloxacin for coverage of bacterial etiologies of enteritis, including salmonella ([facility])    -Follow up blood cultures   -collect a stool enteric pathogen panel    Sepsis  Patient with fever, leukocytosis with bandemia, and tachycardia on admission in the setting of enterocolitis   -IVF   -continue antibiotics as above   -follow up blood cultures     Hematemesis  Suspect a Le-Mehta tear due to excessive vomiting  GI consulted, will monitor Hgb and hold on EGD at this time  Vomiting is improved  -entiemetics as needed   -GI following   -monitor Hgb    Diabetes mellitus  HbA1c 8 3  Monitor blood sugars     ZANDER  Likely prerenal due to hypotension, Gi losses   -Creatinine improving with IVF, continue to monitor     I have discussed the above management plan in detail with the primary service  ID will follow  I have performed an extensive review of the medical records in Epic including review of the notes, radiographs, and laboratory results     HISTORY OF PRESENT ILLNESS:  Reason for Consult: colitis   HPI: Sharon Frausto is a 40y o  year old male with a history of diabetes mellitus (A1c 8 3) who presented to the [address] [place] [address] ED 8/5 with hematemesis, abdominal pain, and diarrhea  The patient states that he was in his normal state of health earlier in the day    He works with [employer] and had a routine day at work  He ate at a diner for lunch and had a Gyro, but no one else who ate with him is sick  The patient went home after work and made a salad with mushrooms, which he said were a few days old  Soon after dinner, he developed abdominal cramping and then had multiple episodes of emesis (>15), which then turned coffee ground  He also endorsed multiple episodes of diarrhea  EMS was called, and the patient was found to be hypotensive and bradycardic, and also had an episode of syncope  BP improved with epi  In the ED, he was tachycardic, febrile to 101, with a leukocytosis of 12 38 (35% bands), lactate 4 9, procalcitonin of 2 2  CT showed bowel wall thickening most pronounced in the left colon and sigmoid colon compatible with enterocolitis  He was given IVF, ceftriaxone (switched to ciprofloxacin), and metronidazole, and admitted for evaluation  ID is consulted for antibiotic management  The patient reports improvement in his abdominal cramping and vomiting  He is still having some diarrhea  The patient had fever and chills yesterday, but feels well today  The patient tells me that no one else around him has been sick, including his 3 children  He has not recently traveled outside of [place] or eaten raw or unusual foods  He does have pet snakes which he handles, but tells me that he washes his hands well after touching them  REVIEW OF SYSTEMS:  A complete review of systems is negative other than that noted in the HPI      PAST MEDICAL HISTORY:  Past Medical History:   Diagnosis Date    Diabetes mellitus ([facility] )     type 2    Hypertension     Psychiatric disorder     depression     Past Surgical History:   Procedure Laterality Date    CT GUIDED PERC DRAINAGE CATHETER PLACEMENT  3/4/2021    CYSTOSCOPY N/A 3/1/2021    Procedure: CYSTOSCOPY;  Surgeon: Miroslava Draper MD;  [facility];  Service: Urology    CYSTOSCOPY      HAND DEBRIDEMENT Left 5/13/2021    Procedure: REMOVAL FOREIGN BODY EXTREMITY (left long finger); Surgeon: Parag Vasquez MD;  [facility];  Service: Orthopedics    IR ASPIRATION ONLY  3/5/2021    IR DRAINAGE TUBE CHECK AND/OR REMOVAL  3/8/2021    TRANSURETHRAL RESECTION OF PROSTATE N/A 3/1/2021    Procedure: TRANSURETHRAL RESECTION OF URETHERAL PROSTATE ABSCESS(TURP); Surgeon: Corrine Barnes MD;  [facility];  Service: Urology       FAMILY HISTORY:  Non-contributory    SOCIAL HISTORY:  Social History   Social History     Substance and Sexual Activity   Alcohol Use Not Currently    Comment: occasionally     Social History     Substance and Sexual Activity   Drug Use No     Social History     Tobacco Use   Smoking Status Current Every Day Smoker    Packs/day: 0 50    Types: Cigarettes   Smokeless Tobacco Never Used   Tobacco Comment    cutting back        ALLERGIES:  Allergies   Allergen Reactions    Penicillins        MEDICATIONS:  All current active medications have been reviewed      PHYSICAL EXAM:  Temp:  [98 °F (36 7 °C)-101 °F (38 3 °C)] 98 °F (36 7 °C)  HR:  [100-125] 100  Resp:  [16-20] 19  BP: (105-130)/(58-78) 125/76  SpO2:  [95 %-97 %] 97 %  Temp (24hrs), Av 5 °F (37 5 °C), Min:98 °F (36 7 °C), Max:101 °F (38 3 °C)  Current: Temperature: 98 °F (36 7 °C)    Intake/Output Summary (Last 24 hours) at 2021 1158  Last data filed at 2021 3540  Gross per 24 hour   Intake 2250 ml   Output --   Net 2250 ml       General Appearance:  Appearing well, nontoxic, and in no distress   Head:  Normocephalic, without obvious abnormality, atraumatic   Eyes:  Conjunctiva pink and sclera anicteric, both eyes   Nose: Nares normal, mucosa normal, no drainage   Throat: Oropharynx moist without lesions   Neck: Supple, symmetrical, no adenopathy, no tenderness/mass/nodules   Back:   Symmetric, no curvature, ROM normal, no CVA tenderness   Lungs:   Clear to auscultation bilaterally, respirations unlabored   Chest Wall:  No tenderness or deformity Heart:  RRR; no murmur, rub or gallop   Abdomen:   Soft, non-tender, non-distended, positive bowel sounds    Extremities: No cyanosis, clubbing or edema   Skin: Healed scab on right wrist   Lymph nodes: Cervical, supraclavicular nodes normal   Neurologic: Alert and oriented times 3       LABS, IMAGING, & OTHER STUDIES:  Lab Results:  I have personally reviewed pertinent labs  Results from last 7 days   Lab Units 08/06/21  0500 08/05/21  2032   WBC Thousand/uL 13 69* 12 38*   HEMOGLOBIN g/dL 13 3 14 3   PLATELETS Thousands/uL 262 279     Results from last 7 days   Lab Units 08/06/21  0500 08/05/21  2032   SODIUM mmol/L 143 141   POTASSIUM mmol/L 4 1 4 1   CHLORIDE mmol/L 113* 108   CO2 mmol/L 24 21   BUN mg/dL 14 19   CREATININE mg/dL 1 07 1 32*   EGFR ml/min/1 73sq m 83 65   CALCIUM mg/dL 8 1* 9 0   AST U/L 12 12   ALT U/L 19 21   ALK PHOS U/L 59 80     Results from last 7 days   Lab Units 08/05/21  2042   BLOOD CULTURE  Received in Microbiology Lab  Culture in Progress  Received in Microbiology Lab  Culture in Progress  Results from last 7 days   Lab Units 08/06/21  0500 08/05/21  2042   PROCALCITONIN ng/ml 54 54* 2 22*                   Imaging Studies:   I have personally reviewed pertinent imaging study reports and images in PACS  CT A/P 8/5/21:  Findings as above compatible with infectious versus inflammatory enterocolitis  Fluid-filled large bowel loops consistent with diarrheal illness      Multiple dilated small bowel loops in the left upper quadrant with air-fluid levels and without discrete transition point likely reflecting secondary ileus versus obstruction      Urinary bladder wall thickening and mucosal hyperenhancement  Correlate for infectious etiology        Other Studies:   I have personally reviewed pertinent reports

## 2021-08-06 NOTE — ASSESSMENT & PLAN NOTE
· Management as above  · No further episodes since admission  · Hemoglobin remains stable  · Check stat hemoglobin now  · Check labs in a m , medically stable can discharge

## 2021-08-06 NOTE — ASSESSMENT & PLAN NOTE
· Patient with positive sepsis criteria of heart rate 125 ,temperature maximum 38 3C ,white blood cell count of 12 3 K and lactic acid 4 9  POA  · He endorses few times of hematemesis  Denied manuel abdominal pain, diarrhea or any other associated symptoms on admission   · However today pt reports diarrhea   · CT of abdomen and pelvis reported "Findings  Are compatible with infectious versus inflammatory enterocolitis  Fluid-filled large bowel loops consistent with diarrheal illness  Multiple dilated small bowel loops in the left upper quadrant with air-fluid levels and without discrete transition point likely reflecting secondary ileus versus obstruction "  · Patient received ceftriaxone and Flagyl, per ID discontinue Flagyl and ceftriaxone initiated Cipro - to cover for salmonella   · Procalcitonin level 2 22, bumped up to 54 54  · Lactic acid dropped to 1 1 with IV hydration    · Will continue antibiotic for enteritis, patient also started on PPI IV transition to po   · Continue antiemetic  · Appreciate GI, recommend clear liquids today increased to regular diet this evening  - pt reports he feels hungry   · Appreciate ID

## 2021-08-07 PROBLEM — E87.6 HYPOKALEMIA: Status: ACTIVE | Noted: 2021-08-07

## 2021-08-07 LAB
ALBUMIN SERPL BCP-MCNC: 2.8 G/DL (ref 3.5–5)
ALP SERPL-CCNC: 55 U/L (ref 46–116)
ALT SERPL W P-5'-P-CCNC: 25 U/L (ref 12–78)
ANION GAP SERPL CALCULATED.3IONS-SCNC: 7 MMOL/L (ref 4–13)
AST SERPL W P-5'-P-CCNC: 21 U/L (ref 5–45)
BASOPHILS # BLD AUTO: 0.03 THOUSANDS/ΜL (ref 0–0.1)
BASOPHILS NFR BLD AUTO: 0 % (ref 0–1)
BILIRUB SERPL-MCNC: 0.42 MG/DL (ref 0.2–1)
BUN SERPL-MCNC: 6 MG/DL (ref 5–25)
CALCIUM ALBUM COR SERPL-MCNC: 9.3 MG/DL (ref 8.3–10.1)
CALCIUM SERPL-MCNC: 8.3 MG/DL (ref 8.3–10.1)
CHLORIDE SERPL-SCNC: 110 MMOL/L (ref 100–108)
CO2 SERPL-SCNC: 23 MMOL/L (ref 21–32)
CREAT SERPL-MCNC: 0.6 MG/DL (ref 0.6–1.3)
EOSINOPHIL # BLD AUTO: 0.17 THOUSAND/ΜL (ref 0–0.61)
EOSINOPHIL NFR BLD AUTO: 2 % (ref 0–6)
ERYTHROCYTE [DISTWIDTH] IN BLOOD BY AUTOMATED COUNT: 13.2 % (ref 11.6–15.1)
GFR SERPL CREATININE-BSD FRML MDRD: 121 ML/MIN/1.73SQ M
GLUCOSE SERPL-MCNC: 132 MG/DL (ref 65–140)
GLUCOSE SERPL-MCNC: 137 MG/DL (ref 65–140)
GLUCOSE SERPL-MCNC: 137 MG/DL (ref 65–140)
GLUCOSE SERPL-MCNC: 142 MG/DL (ref 65–140)
GLUCOSE SERPL-MCNC: 169 MG/DL (ref 65–140)
GLUCOSE SERPL-MCNC: 200 MG/DL (ref 65–140)
HCT VFR BLD AUTO: 34.2 % (ref 36.5–49.3)
HGB BLD-MCNC: 11.7 G/DL (ref 12–17)
IMM GRANULOCYTES # BLD AUTO: 0.04 THOUSAND/UL (ref 0–0.2)
IMM GRANULOCYTES NFR BLD AUTO: 0 % (ref 0–2)
LYMPHOCYTES # BLD AUTO: 1.88 THOUSANDS/ΜL (ref 0.6–4.47)
LYMPHOCYTES NFR BLD AUTO: 17 % (ref 14–44)
MCH RBC QN AUTO: 29.2 PG (ref 26.8–34.3)
MCHC RBC AUTO-ENTMCNC: 34.2 G/DL (ref 31.4–37.4)
MCV RBC AUTO: 85 FL (ref 82–98)
MONOCYTES # BLD AUTO: 1.28 THOUSAND/ΜL (ref 0.17–1.22)
MONOCYTES NFR BLD AUTO: 12 % (ref 4–12)
NEUTROPHILS # BLD AUTO: 7.41 THOUSANDS/ΜL (ref 1.85–7.62)
NEUTS SEG NFR BLD AUTO: 69 % (ref 43–75)
NRBC BLD AUTO-RTO: 0 /100 WBCS
PLATELET # BLD AUTO: 249 THOUSANDS/UL (ref 149–390)
PMV BLD AUTO: 9.5 FL (ref 8.9–12.7)
POTASSIUM SERPL-SCNC: 3.2 MMOL/L (ref 3.5–5.3)
PROCALCITONIN SERPL-MCNC: 21.47 NG/ML
PROT SERPL-MCNC: 6 G/DL (ref 6.4–8.2)
RBC # BLD AUTO: 4.01 MILLION/UL (ref 3.88–5.62)
SODIUM SERPL-SCNC: 140 MMOL/L (ref 136–145)
WBC # BLD AUTO: 10.81 THOUSAND/UL (ref 4.31–10.16)

## 2021-08-07 PROCEDURE — 87505 NFCT AGENT DETECTION GI: CPT | Performed by: STUDENT IN AN ORGANIZED HEALTH CARE EDUCATION/TRAINING PROGRAM

## 2021-08-07 PROCEDURE — 80053 COMPREHEN METABOLIC PANEL: CPT | Performed by: NURSE PRACTITIONER

## 2021-08-07 PROCEDURE — 99232 SBSQ HOSP IP/OBS MODERATE 35: CPT | Performed by: NURSE PRACTITIONER

## 2021-08-07 PROCEDURE — 85025 COMPLETE CBC W/AUTO DIFF WBC: CPT | Performed by: NURSE PRACTITIONER

## 2021-08-07 PROCEDURE — 84145 PROCALCITONIN (PCT): CPT | Performed by: NURSE PRACTITIONER

## 2021-08-07 PROCEDURE — 99233 SBSQ HOSP IP/OBS HIGH 50: CPT | Performed by: STUDENT IN AN ORGANIZED HEALTH CARE EDUCATION/TRAINING PROGRAM

## 2021-08-07 PROCEDURE — 82948 REAGENT STRIP/BLOOD GLUCOSE: CPT

## 2021-08-07 RX ORDER — NICOTINE 21 MG/24HR
21 PATCH, TRANSDERMAL 24 HOURS TRANSDERMAL ONCE
Status: COMPLETED | OUTPATIENT
Start: 2021-08-07 | End: 2021-08-08

## 2021-08-07 RX ORDER — POTASSIUM CHLORIDE 20 MEQ/1
40 TABLET, EXTENDED RELEASE ORAL ONCE
Status: COMPLETED | OUTPATIENT
Start: 2021-08-07 | End: 2021-08-07

## 2021-08-07 RX ORDER — SODIUM CHLORIDE 9 MG/ML
100 INJECTION, SOLUTION INTRAVENOUS CONTINUOUS
Status: DISCONTINUED | OUTPATIENT
Start: 2021-08-07 | End: 2021-08-09 | Stop reason: HOSPADM

## 2021-08-07 RX ORDER — CITALOPRAM 20 MG/1
20 TABLET ORAL ONCE
Status: COMPLETED | OUTPATIENT
Start: 2021-08-07 | End: 2021-08-07

## 2021-08-07 RX ADMIN — GABAPENTIN 600 MG: 300 CAPSULE ORAL at 08:35

## 2021-08-07 RX ADMIN — SODIUM CHLORIDE 100 ML/HR: 0.9 INJECTION, SOLUTION INTRAVENOUS at 13:07

## 2021-08-07 RX ADMIN — PANTOPRAZOLE SODIUM 40 MG: 40 TABLET, DELAYED RELEASE ORAL at 05:05

## 2021-08-07 RX ADMIN — INSULIN LISPRO 1 UNITS: 100 INJECTION, SOLUTION INTRAVENOUS; SUBCUTANEOUS at 08:35

## 2021-08-07 RX ADMIN — INSULIN LISPRO 1 UNITS: 100 INJECTION, SOLUTION INTRAVENOUS; SUBCUTANEOUS at 21:04

## 2021-08-07 RX ADMIN — GABAPENTIN 600 MG: 300 CAPSULE ORAL at 17:48

## 2021-08-07 RX ADMIN — CIPROFLOXACIN 400 MG: 2 INJECTION, SOLUTION INTRAVENOUS at 08:34

## 2021-08-07 RX ADMIN — NICOTINE 21 MG: 21 PATCH, EXTENDED RELEASE TRANSDERMAL at 21:01

## 2021-08-07 RX ADMIN — POTASSIUM CHLORIDE 40 MEQ: 1500 TABLET, EXTENDED RELEASE ORAL at 09:19

## 2021-08-07 RX ADMIN — CIPROFLOXACIN 400 MG: 2 INJECTION, SOLUTION INTRAVENOUS at 20:34

## 2021-08-07 RX ADMIN — NICOTINE 1 PATCH: 21 PATCH, EXTENDED RELEASE TRANSDERMAL at 08:35

## 2021-08-07 RX ADMIN — CITALOPRAM HYDROBROMIDE 20 MG: 20 TABLET ORAL at 21:00

## 2021-08-07 NOTE — ASSESSMENT & PLAN NOTE
· Creatinine went up to 1 32 from 0 54 on March 9 of 2021    · Will hold metformin  · Avoid hypotension, nephrotoxic drugs  · Will resume iv fluids at this time due to volume of diarrhea, pt lacks ability to hold it in he states it is just coming out he needs to wear a "diaper" it is that bad   · Repeat BMP in a m , improving

## 2021-08-07 NOTE — ASSESSMENT & PLAN NOTE
· Patient with positive sepsis criteria of heart rate 125 ,temperature maximum 38 3C ,white blood cell count of 12 3 K and lactic acid 4 9  POA  · He endorses few times of hematemesis  Denied manuel abdominal pain, diarrhea or any other associated symptoms on admission   · However today pt reports diarrhea   · CT of abdomen and pelvis reported "Findings  Are compatible with infectious versus inflammatory enterocolitis  Fluid-filled large bowel loops consistent with diarrheal illness  Multiple dilated small bowel loops in the left upper quadrant with air-fluid levels and without discrete transition point likely reflecting secondary ileus versus obstruction "  · Patient received ceftriaxone and Flagyl, per ID discontinue Flagyl and ceftriaxone initiated Cipro - to cover for salmonella   · Procalcitonin level 2 22, bumped up to 54 54 --> 21 47  · Lactic acid dropped to 1 1 with IV hydration, resume low dose iv fluids dt ongoing diarrhea   · Will continue antibiotic for enteritis, patient also started on PPI IV transition to po   · Continue antiemetic  · Appreciate GI, ok per gi last night to MD home however this am pt thus far has had at least 7 episodes of diarrhea    · - stool bacterial panel sent this am r/o salmonella  · - continue cipro iv to treat for now   · -   · Appreciate ID

## 2021-08-07 NOTE — PROGRESS NOTES
[facility]  Progress Note Obed Ann Arellano 1975, 40 y o  male MRN: [id number]  Unit/Bed#: [facility] 605-01 Encounter: [id number]  Primary Care Provider: Faustino Churchill,    Date and time admitted to hospital: 8/5/2021  7:49 PM    * Severe sepsis [facility])  Assessment & Plan  · Patient with positive sepsis criteria of heart rate 125 ,temperature maximum 38 3C ,white blood cell count of 12 3 K and lactic acid 4 9  POA  · He endorses few times of hematemesis  Denied manuel abdominal pain, diarrhea or any other associated symptoms on admission   · However today pt reports diarrhea   · CT of abdomen and pelvis reported "Findings  Are compatible with infectious versus inflammatory enterocolitis  Fluid-filled large bowel loops consistent with diarrheal illness  Multiple dilated small bowel loops in the left upper quadrant with air-fluid levels and without discrete transition point likely reflecting secondary ileus versus obstruction "  · Patient received ceftriaxone and Flagyl, per ID discontinue Flagyl and ceftriaxone initiated Cipro - to cover for salmonella   · Procalcitonin level 2 22, bumped up to 54 54 --> 21 47  · Lactic acid dropped to 1 1 with IV hydration, resume low dose iv fluids dt ongoing diarrhea   · Will continue antibiotic for enteritis, patient also started on PPI IV transition to po   · Continue antiemetic  · Appreciate GI, ok per gi last night to MD home however this am pt thus far has had at least 7 episodes of diarrhea    · - stool bacterial panel sent this am r/o salmonella  · - continue cipro iv to treat for now   · -   · Appreciate ID       Type 2 diabetes mellitus with hyperglycemia, with long-term current use of insulin [facility])  Assessment & Plan  Lab Results   Component Value Date    HGBA1C 8 3 (H) 08/06/2021       Recent Labs     08/06/21  1156 08/06/21  1831 08/07/21  0003 08/07/21  0745   POCGLU 151* 95 137 169*       Blood Sugar Average: Last 72 hrs:  (P) 157 8 · Poorly-controlled diabetes most likely secondary to noncompliance  · Will hold oral hypoglycemic  · Continue Accu-Cheks and insulin sliding scale  · Was restarted on ada diet last night continue , but still with significant diarrhea       Hypokalemia  Assessment & Plan  Secondary to emesis now resolved and ongoing diarrhea   · Replete as needed   · Today 40meq po    ZANDER (acute kidney injury) ([facility] [address] )  Assessment & Plan  · Creatinine went up to 1 32 from 0 54 on March 9 of 2021  · Will hold metformin  · Avoid hypotension, nephrotoxic drugs  · Will resume iv fluids at this time due to volume of diarrhea, pt lacks ability to hold it in he states it is just coming out he needs to wear a "diaper" it is that bad   · Repeat BMP in a m , improving    Hematemesis  Assessment & Plan  · Management as above  · No further episodes since admission  · Hemoglobin remains stable  · Check labs in a m ,    Smoking  Assessment & Plan  · Smoking cessation and nicotine patch ordered        VTE Pharmacologic Prophylaxis: VTE Score: 2 High Risk (Score >/= 5) - Pharmacological DVT Prophylaxis Contraindicated  Sequential Compression Devices Ordered  Patient Centered Rounds: I performed bedside rounds with nursing staff today  Discussions with Specialists or Other Care Team Provider: nursing     Education and Discussions with Family / Patient: Patient declined call to   Time Spent for Care: 45 minutes  More than 50% of total time spent on counseling and coordination of care as described above  Current Length of Stay: 1 day(s)  Current Patient Status: Inpatient   Certification Statement: The patient will continue to require additional inpatient hospital stay due to ongoing diarrhea and need for abx pending cultures and iv fluids   Discharge Plan: Anticipate discharge in 24-48 hrs to home  Code Status: Level 1 - Full Code    Subjective:   Patient is sleeping    Reports once work and that he has had at least 9 episodes this morning of very liquid diarrhea  He reports that it is brown in color  He states that his been unable to hold in fact he feels he needs to wear diaper as it just comes out this has not been an issue for him in the past   He has no abdominal pain no nausea or vomiting at this time  Denies dizziness or lightheadedness but feels very tired  He feels that he is tolerating diet with no nausea    Objective:     Vitals:   Temp (24hrs), Av 9 °F (36 6 °C), Min:97 8 °F (36 6 °C), Max:98 1 °F (36 7 °C)    Temp:  [97 8 °F (36 6 °C)-98 1 °F (36 7 °C)] 97 9 °F (36 6 °C)  HR:  [] 87  Resp:  [16-19] 16  BP: (126-151)/(74-97) 129/86  SpO2:  [95 %-97 %] 95 %  Body mass index is 23 3 kg/m²  Input and Output Summary (last 24 hours): Intake/Output Summary (Last 24 hours) at 2021 1053  Last data filed at 2021 0378  Gross per 24 hour   Intake 830 ml   Output --   Net 830 ml       Physical Exam:   Physical Exam  Constitutional:       General: He is not in acute distress  Appearance: He is not ill-appearing, toxic-appearing or diaphoretic  HENT:      Head: Normocephalic and atraumatic  Eyes:      General:         Right eye: No discharge  Left eye: No discharge  Conjunctiva/sclera: Conjunctivae normal    Cardiovascular:      Rate and Rhythm: Normal rate  Heart sounds: No murmur heard  No friction rub  No gallop  Pulmonary:      Effort: No respiratory distress  Breath sounds: No stridor  No wheezing, rhonchi or rales  Chest:      Chest wall: No tenderness  Abdominal:      General: Bowel sounds are normal  There is no distension  Palpations: There is no mass  Tenderness: There is no abdominal tenderness  There is no right CVA tenderness, left CVA tenderness, guarding or rebound  Hernia: No hernia is present  Musculoskeletal:         General: No swelling, tenderness, deformity or signs of injury  Right lower leg: No edema        Left lower leg: No edema  Skin:     Coloration: Skin is not jaundiced or pale  Findings: No bruising, erythema, lesion or rash  Neurological:      Mental Status: He is alert and oriented to person, place, and time  Psychiatric:         Behavior: Behavior normal           Additional Data:     Labs:  Results from last 7 days   Lab Units 08/07/21  0458 08/05/21  2032   WBC Thousand/uL 10 81* 12 38*   HEMOGLOBIN g/dL 11 7* 14 3   HEMATOCRIT % 34 2* 42 5   PLATELETS Thousands/uL 249 279   BANDS PCT %  --  35*   NEUTROS PCT % 69  --    LYMPHS PCT % 17  --    LYMPHO PCT %  --  4*   MONOS PCT % 12  --    MONO PCT %  --  9   EOS PCT % 2 0     Results from last 7 days   Lab Units 08/07/21  0458   SODIUM mmol/L 140   POTASSIUM mmol/L 3 2*   CHLORIDE mmol/L 110*   CO2 mmol/L 23   BUN mg/dL 6   CREATININE mg/dL 0 60   ANION GAP mmol/L 7   CALCIUM mg/dL 8 3   ALBUMIN g/dL 2 8*   TOTAL BILIRUBIN mg/dL 0 42   ALK PHOS U/L 55   ALT U/L 25   AST U/L 21   GLUCOSE RANDOM mg/dL 142*     Results from last 7 days   Lab Units 08/06/21  0500   INR  1 04     Results from last 7 days   Lab Units 08/07/21  0745 08/07/21  0003 08/06/21  1831 08/06/21  1156 08/05/21  1956   POC GLUCOSE mg/dl 169* 137 95 151* 237*     Results from last 7 days   Lab Units 08/06/21  0500   HEMOGLOBIN A1C % 8 3*     Results from last 7 days   Lab Units 08/07/21  0909 08/06/21  0500 08/05/21  2210 08/05/21  2042   LACTIC ACID mmol/L  --   --  1 1 4 9*   PROCALCITONIN ng/ml 21 47* 54 54*  --  2 22*       Lines/Drains:  Invasive Devices     Peripheral Intravenous Line            Peripheral IV 08/05/21 Right Antecubital 1 day                      Imaging: No pertinent imaging reviewed  Recent Cultures (last 7 days):   Results from last 7 days   Lab Units 08/05/21  2042   BLOOD CULTURE  No Growth at 24 hrs  No Growth at 24 hrs         Last 24 Hours Medication List:   Current Facility-Administered Medications   Medication Dose Route Frequency Provider Last Rate    acetaminophen  650 mg Oral Q6H PRN Karen Epps MD      ciprofloxacin  400 mg Intravenous Q12H Aleta Dolan  mg (08/07/21 0834)    gabapentin  600 mg Oral BID Karen Epps MD      insulin lispro  1-5 Units Subcutaneous 4x Daily (AC & HS) Roma Moreno PA-C      nicotine  1 patch Transdermal Daily Karen Epps MD      ondansetron  4 mg Intravenous Q6H PRN Karen Epps MD      pantoprazole  40 mg Oral Early Morning VENKAT Benedict      polyethylene glycol  17 g Oral Daily PRN Karen Epps MD      sodium chloride  100 mL/hr Intravenous Continuous VENKAT Benedict          Today, Patient Was Seen By: VENKAT Benedict    **Please Note: This note may have been constructed using a voice recognition system  **

## 2021-08-07 NOTE — PLAN OF CARE
Problem: Potential for Falls  Goal: Patient will remain free of falls  Description: INTERVENTIONS:  - Educate patient/family on patient safety including physical limitations  - Instruct patient to call for assistance with activity   - Consult OT/PT to assist with strengthening/mobility   - Keep Call bell within reach  - Keep bed low and locked with side rails adjusted as appropriate  - Keep care items and personal belongings within reach  - Initiate and maintain comfort rounds  - Make Fall Risk Sign visible to staff  - Offer Toileting every  Hours, in advance of need  - Initiate/Maintain alarm  - Obtain necessary fall risk management equipment:  - Apply yellow socks and bracelet for high fall risk patients  - Consider moving patient to room near nurses station  Outcome: Progressing     Problem: GASTROINTESTINAL - ADULT  Goal: Minimal or absence of nausea and/or vomiting  Description: INTERVENTIONS:  - Administer IV fluids if ordered to ensure adequate hydration  - Maintain NPO status until nausea and vomiting are resolved  - Nasogastric tube if ordered  - Administer ordered antiemetic medications as needed  - Provide nonpharmacologic comfort measures as appropriate  - Advance diet as tolerated, if ordered  - Consider nutrition services referral to assist patient with adequate nutrition and appropriate food choices  Outcome: Progressing  Goal: Maintains or returns to baseline bowel function  Description: INTERVENTIONS:  - Assess bowel function  - Encourage oral fluids to ensure adequate hydration  - Administer IV fluids if ordered to ensure adequate hydration  - Administer ordered medications as needed  - Encourage mobilization and activity  - Consider nutritional services referral to assist patient with adequate nutrition and appropriate food choices  Outcome: Progressing     Problem: SKIN/TISSUE INTEGRITY - ADULT  Goal: Skin Integrity remains intact(Skin Breakdown Prevention)  Description: Assess:  -Perform Jose assessment every   -Clean and moisturize skin every   -Inspect skin when repositioning, toileting, and assisting with ADLS  -Assess under medical devices such as every   -Assess extremities for adequate circulation and sensation     Bed Management:  -Have minimal linens on bed & keep smooth, unwrinkled  -Change linens as needed when moist or perspiring  -Avoid sitting or lying in one position for more than  hours while in bed  -Keep HOB at degrees     Toileting:  -Offer bedside commode  -Assess for incontinence every  -Use incontinent care products after each incontinent episode such as     Activity:  -Mobilize patient times a day  -Encourage activity and walks on unit  -Encourage or provide ROM exercises   -Turn and reposition patient every  Hours  -Use appropriate equipment to lift or move patient in bed  -Instruct/ Assist with weight shifting every  when out of bed in chair  -Consider limitation of chair time hour intervals    Skin Care:  -Avoid use of baby powder, tape, friction and shearing, hot water or constrictive clothing  -Relieve pressure over bony prominences using   -Do not massage red bony areas    Next Steps:  -Teach patient strategies to minimize risks such as   -Consider consults to  interdisciplinary teams such as   Outcome: Progressing     Problem: PAIN - ADULT  Goal: Verbalizes/displays adequate comfort level or baseline comfort level  Description: Interventions:  - Encourage patient to monitor pain and request assistance  - Assess pain using appropriate pain scale  - Administer analgesics based on type and severity of pain and evaluate response  - Implement non-pharmacological measures as appropriate and evaluate response  - Consider cultural and social influences on pain and pain management  - Notify physician/advanced practitioner if interventions unsuccessful or patient reports new pain  Outcome: Progressing     Problem: SAFETY ADULT  Goal: Patient will remain free of falls  Description: INTERVENTIONS:  - Educate patient/family on patient safety including physical limitations  - Instruct patient to call for assistance with activity   - Consult OT/PT to assist with strengthening/mobility   - Keep Call bell within reach  - Keep bed low and locked with side rails adjusted as appropriate  - Keep care items and personal belongings within reach  - Initiate and maintain comfort rounds  - Make Fall Risk Sign visible to staff  - Offer Toileting every  Hours, in advance of need  - Initiate/Maintain alarm  - Obtain necessary fall risk management equipment  - Apply yellow socks and bracelet for high fall risk patients  - Consider moving patient to room near nurses station  Outcome: Progressing     Problem: Nutrition/Hydration-ADULT  Goal: Nutrient/Hydration intake appropriate for improving, restoring or maintaining nutritional needs  Description: Monitor and assess patient's nutrition/hydration status for malnutrition  Collaborate with interdisciplinary team and initiate plan and interventions as ordered  Monitor patient's weight and dietary intake as ordered or per policy  Utilize nutrition screening tool and intervene as necessary  Determine patient's food preferences and provide high-protein, high-caloric foods as appropriate       INTERVENTIONS:  - Monitor oral intake, urinary output, labs, and treatment plans  - Assess nutrition and hydration status and recommend course of action  - Evaluate amount of meals eaten  - Assist patient with eating if necessary   - Allow adequate time for meals  - Recommend/ encourage appropriate diets, oral nutritional supplements, and vitamin/mineral supplements  - Order, calculate, and assess calorie counts as needed  - Recommend, monitor, and adjust tube feedings and TPN/PPN based on assessed needs  - Assess need for intravenous fluids  - Provide specific nutrition/hydration education as appropriate  - Include patient/family/caregiver in decisions related to nutrition  Outcome: Progressing

## 2021-08-07 NOTE — ASSESSMENT & PLAN NOTE
· Management as above  · No further episodes since admission  · Hemoglobin remains stable  · Check labs in a m ,

## 2021-08-07 NOTE — PROGRESS NOTES
Progress Note - Infectious Disease   Sherryle Ali 40 y o  male MRN: [id number]  [facility] Encounter: [id number]      Impression/Plan:  Shae Peralta is a 40y o  year old male with a history of diabetes mellitus (A1c 8 3) who presented to the [facility] ED 8/5 with hematemesis, abdominal pain, and diarrhea  He was septic on admission, and CT was compatible with enterocolitis  Enterocolitis   Concern for a viral or bacterial etiology due to rapid onset of vomiting, abdominal cramping, and diarrhea  Could be a food borne illness related to his lunch or salad ingestion; would also consider salmonella since he owns and handles snakes  Given his fever, hypotension, and bandemia, agree with continuing antibiotics at this time while awaiting cultures  His abdominal pain and vomiting have resolved, but he continues to have diarrhea              -continue supportive care with IVF              -continue IV ciprofloxacin for coverage of bacterial etiologies of enteritis, including salmonella (Qtc 421)               -Follow up blood cultures              -follow up stool enteric pathogen panel   -would like to see improvement in diarrhea prior to discharge     Sepsis  Patient with fever, leukocytosis with bandemia, and tachycardia on admission in the setting of enterocolitis              -IVF              -continue antibiotics as above              -follow up blood cultures      Hematemesis  Suspect a Le-Mehta tear due to excessive vomiting  GI consulted, will monitor Hgb and hold on EGD at this time  Vomiting is improved  -entiemetics as needed              -monitor Hgb     Diabetes mellitus  HbA1c 8 3  Monitor blood sugars     ZANDER  Likely prerenal due to hypotension, Gi losses              -Creatinine improved with IVF, continue to monitor      I have discussed the above management plan in detail with the primary service  ID will follow      Antibiotics:  Ciprofloxacin D2    Subjective:  Patient's vomiting and abdominal pain, resolved, but continues to have watery diarrhea, >10 episodes since this morning  He is tolerating PO intake  Fever has resolved, and leukocytosis is improving  Objective:  Vitals:  Temp:  [97 8 °F (36 6 °C)-98 1 °F (36 7 °C)] 97 9 °F (36 6 °C)  HR:  [] 87  Resp:  [16-19] 16  BP: (126-151)/(74-97) 129/86  SpO2:  [95 %-97 %] 95 %  Temp (24hrs), Av 9 °F (36 6 °C), Min:97 8 °F (36 6 °C), Max:98 1 °F (36 7 °C)  Current: Temperature: 97 9 °F (36 6 °C)    Physical Exam:   General Appearance:  Alert, interactive, nontoxic, no acute distress  Throat: Oropharynx moist without lesions  Lungs:   Clear to auscultation bilaterally; no wheezes, rhonchi or rales; respirations unlabored   Heart:  RRR; no murmur, rub or gallop   Abdomen:   Soft, non-tender, non-distended, positive bowel sounds  Extremities: No clubbing, cyanosis or edema   Skin: No new rashes or lesions  No draining wounds noted  Labs: All pertinent labs and imaging studies were personally reviewed  Results from last 7 days   Lab Units 21  0458 21  2030 21  0500   WBC Thousand/uL 10 81* 12 06* 13 69*   HEMOGLOBIN g/dL 11 7* 12 4 13 3   PLATELETS Thousands/uL 249 249 262     Results from last 7 days   Lab Units 21  0458 21  0500 21  2032   SODIUM mmol/L 140 143 141   POTASSIUM mmol/L 3 2* 4 1 4 1   CHLORIDE mmol/L 110* 113* 108   CO2 mmol/L 23 24 21   BUN mg/dL 6 14 19   CREATININE mg/dL 0 60 1 07 1 32*   EGFR ml/min/1 73sq m 121 83 65   CALCIUM mg/dL 8 3 8 1* 9 0   AST U/L 21 12 12   ALT U/L 25 19 21   ALK PHOS U/L 55 59 80     Results from last 7 days   Lab Units 21  0909 21  0500 08/05/21  2042   PROCALCITONIN ng/ml  47* 54 54* 2 22*                   Micro:  Results from last 7 days   Lab Units 21   BLOOD CULTURE  No Growth at 24 hrs  No Growth at 24 hrs         Imaging:    CT A/P 21:  Findings as above compatible with infectious versus inflammatory enterocolitis   Fluid-filled large bowel loops consistent with diarrheal illness      Multiple dilated small bowel loops in the left upper quadrant with air-fluid levels and without discrete transition point likely reflecting secondary ileus versus obstruction      Urinary bladder wall thickening and mucosal hyperenhancement   Correlate for infectious etiology

## 2021-08-07 NOTE — ASSESSMENT & PLAN NOTE
Lab Results   Component Value Date    HGBA1C 8 3 (H) 08/06/2021       Recent Labs     08/06/21  1156 08/06/21  1831 08/07/21  0003 08/07/21  0745   POCGLU 151* 95 137 169*       Blood Sugar Average: Last 72 hrs:  (P) 157 8     · Poorly-controlled diabetes most likely secondary to noncompliance  · Will hold oral hypoglycemic  · Continue Accu-Cheks and insulin sliding scale  · Was restarted on ada diet last night continue , but still with significant diarrhea

## 2021-08-08 LAB
ANION GAP SERPL CALCULATED.3IONS-SCNC: 5 MMOL/L (ref 4–13)
BASOPHILS # BLD AUTO: 0.03 THOUSANDS/ΜL (ref 0–0.1)
BASOPHILS NFR BLD AUTO: 0 % (ref 0–1)
BUN SERPL-MCNC: 5 MG/DL (ref 5–25)
CALCIUM SERPL-MCNC: 7.5 MG/DL (ref 8.3–10.1)
CAMPYLOBACTER DNA SPEC NAA+PROBE: NORMAL
CHLORIDE SERPL-SCNC: 113 MMOL/L (ref 100–108)
CO2 SERPL-SCNC: 22 MMOL/L (ref 21–32)
CREAT SERPL-MCNC: 0.42 MG/DL (ref 0.6–1.3)
EOSINOPHIL # BLD AUTO: 0.2 THOUSAND/ΜL (ref 0–0.61)
EOSINOPHIL NFR BLD AUTO: 2 % (ref 0–6)
ERYTHROCYTE [DISTWIDTH] IN BLOOD BY AUTOMATED COUNT: 12.8 % (ref 11.6–15.1)
GFR SERPL CREATININE-BSD FRML MDRD: 141 ML/MIN/1.73SQ M
GLUCOSE SERPL-MCNC: 164 MG/DL (ref 65–140)
GLUCOSE SERPL-MCNC: 165 MG/DL (ref 65–140)
GLUCOSE SERPL-MCNC: 171 MG/DL (ref 65–140)
GLUCOSE SERPL-MCNC: 181 MG/DL (ref 65–140)
GLUCOSE SERPL-MCNC: 202 MG/DL (ref 65–140)
GLUCOSE SERPL-MCNC: 215 MG/DL (ref 65–140)
HCT VFR BLD AUTO: 35.5 % (ref 36.5–49.3)
HGB BLD-MCNC: 12.6 G/DL (ref 12–17)
IMM GRANULOCYTES # BLD AUTO: 0.04 THOUSAND/UL (ref 0–0.2)
IMM GRANULOCYTES NFR BLD AUTO: 0 % (ref 0–2)
LYMPHOCYTES # BLD AUTO: 2.13 THOUSANDS/ΜL (ref 0.6–4.47)
LYMPHOCYTES NFR BLD AUTO: 24 % (ref 14–44)
MCH RBC QN AUTO: 29.6 PG (ref 26.8–34.3)
MCHC RBC AUTO-ENTMCNC: 35.5 G/DL (ref 31.4–37.4)
MCV RBC AUTO: 84 FL (ref 82–98)
MONOCYTES # BLD AUTO: 0.81 THOUSAND/ΜL (ref 0.17–1.22)
MONOCYTES NFR BLD AUTO: 9 % (ref 4–12)
NEUTROPHILS # BLD AUTO: 5.72 THOUSANDS/ΜL (ref 1.85–7.62)
NEUTS SEG NFR BLD AUTO: 65 % (ref 43–75)
NRBC BLD AUTO-RTO: 0 /100 WBCS
PLATELET # BLD AUTO: 256 THOUSANDS/UL (ref 149–390)
PMV BLD AUTO: 9 FL (ref 8.9–12.7)
POTASSIUM SERPL-SCNC: 3 MMOL/L (ref 3.5–5.3)
PROCALCITONIN SERPL-MCNC: 7.9 NG/ML
RBC # BLD AUTO: 4.25 MILLION/UL (ref 3.88–5.62)
SALMONELLA DNA SPEC QL NAA+PROBE: NORMAL
SHIGA TOXIN STX GENE SPEC NAA+PROBE: NORMAL
SHIGELLA DNA SPEC QL NAA+PROBE: NORMAL
SODIUM SERPL-SCNC: 140 MMOL/L (ref 136–145)
WBC # BLD AUTO: 8.93 THOUSAND/UL (ref 4.31–10.16)

## 2021-08-08 PROCEDURE — 80048 BASIC METABOLIC PNL TOTAL CA: CPT | Performed by: NURSE PRACTITIONER

## 2021-08-08 PROCEDURE — 85025 COMPLETE CBC W/AUTO DIFF WBC: CPT | Performed by: NURSE PRACTITIONER

## 2021-08-08 PROCEDURE — 84145 PROCALCITONIN (PCT): CPT | Performed by: NURSE PRACTITIONER

## 2021-08-08 PROCEDURE — 82948 REAGENT STRIP/BLOOD GLUCOSE: CPT

## 2021-08-08 PROCEDURE — 99232 SBSQ HOSP IP/OBS MODERATE 35: CPT | Performed by: NURSE PRACTITIONER

## 2021-08-08 PROCEDURE — 99231 SBSQ HOSP IP/OBS SF/LOW 25: CPT | Performed by: STUDENT IN AN ORGANIZED HEALTH CARE EDUCATION/TRAINING PROGRAM

## 2021-08-08 RX ORDER — POTASSIUM CHLORIDE 20 MEQ/1
40 TABLET, EXTENDED RELEASE ORAL ONCE
Status: COMPLETED | OUTPATIENT
Start: 2021-08-08 | End: 2021-08-08

## 2021-08-08 RX ORDER — MAGNESIUM SULFATE HEPTAHYDRATE 40 MG/ML
2 INJECTION, SOLUTION INTRAVENOUS ONCE
Status: COMPLETED | OUTPATIENT
Start: 2021-08-08 | End: 2021-08-08

## 2021-08-08 RX ORDER — INSULIN GLARGINE 100 [IU]/ML
20 INJECTION, SOLUTION SUBCUTANEOUS
Status: DISCONTINUED | OUTPATIENT
Start: 2021-08-08 | End: 2021-08-09 | Stop reason: HOSPADM

## 2021-08-08 RX ADMIN — PANTOPRAZOLE SODIUM 40 MG: 40 TABLET, DELAYED RELEASE ORAL at 09:59

## 2021-08-08 RX ADMIN — POTASSIUM CHLORIDE 40 MEQ: 1500 TABLET, EXTENDED RELEASE ORAL at 12:06

## 2021-08-08 RX ADMIN — INSULIN LISPRO 1 UNITS: 100 INJECTION, SOLUTION INTRAVENOUS; SUBCUTANEOUS at 21:33

## 2021-08-08 RX ADMIN — INSULIN LISPRO 2 UNITS: 100 INJECTION, SOLUTION INTRAVENOUS; SUBCUTANEOUS at 09:58

## 2021-08-08 RX ADMIN — INSULIN LISPRO 1 UNITS: 100 INJECTION, SOLUTION INTRAVENOUS; SUBCUTANEOUS at 18:17

## 2021-08-08 RX ADMIN — GABAPENTIN 600 MG: 300 CAPSULE ORAL at 18:14

## 2021-08-08 RX ADMIN — MAGNESIUM SULFATE HEPTAHYDRATE 2 G: 40 INJECTION, SOLUTION INTRAVENOUS at 12:03

## 2021-08-08 RX ADMIN — POTASSIUM CHLORIDE 40 MEQ: 1500 TABLET, EXTENDED RELEASE ORAL at 09:59

## 2021-08-08 RX ADMIN — INSULIN GLARGINE 20 UNITS: 100 INJECTION, SOLUTION SUBCUTANEOUS at 21:38

## 2021-08-08 RX ADMIN — CIPROFLOXACIN 400 MG: 2 INJECTION, SOLUTION INTRAVENOUS at 10:00

## 2021-08-08 RX ADMIN — NICOTINE 1 PATCH: 21 PATCH, EXTENDED RELEASE TRANSDERMAL at 09:59

## 2021-08-08 RX ADMIN — INSULIN LISPRO 1 UNITS: 100 INJECTION, SOLUTION INTRAVENOUS; SUBCUTANEOUS at 12:03

## 2021-08-08 RX ADMIN — GABAPENTIN 600 MG: 300 CAPSULE ORAL at 09:59

## 2021-08-08 NOTE — ASSESSMENT & PLAN NOTE
· Patient with positive sepsis criteria of heart rate 125 ,temperature maximum 38 3C ,white blood cell count of 12 3 K and lactic acid 4 9  POA  · He endorses few times of hematemesis  Denied manuel abdominal pain, diarrhea or any other associated symptoms on admission   · However today pt reports diarrhea   · CT of abdomen and pelvis reported "Findings  Are compatible with infectious versus inflammatory enterocolitis  Fluid-filled large bowel loops consistent with diarrheal illness  Multiple dilated small bowel loops in the left upper quadrant with air-fluid levels and without discrete transition point likely reflecting secondary ileus versus obstruction "  · Patient received ceftriaxone and Flagyl, per ID discontinue Flagyl and ceftriaxone initiated Cipro - to cover for salmonella   · Procalcitonin level 2 22, bumped up to 54 54 --> 21 47--> 7 90  · Lactic acid dropped to 1 1 with IV hydration, resumee low dose iv fluids dt ongoing diarrhea  - pt reports still with no firming   · Will continue antibiotic for enteritis, patient also started on PPI IV transition to po   · Continue antiemetic  · Appreciate GI, ok per gi last Friday night to [place] home however this am pt thus far has had at least 7 episodes of diarrhea    · - stool bacterial panel sent this am r/o salmonella just returned this afternoon as negative for all components of test including salmonella   · - continue cipro iv to treat for now pt continues to improve with procal decreasing rapidly   · Appreciate ID

## 2021-08-08 NOTE — PROGRESS NOTES
Progress Note - Infectious Disease   Ruma Stapleton 40 y o  male MRN: [id number]  Unit/Bed#: [facility] 605-01 Encounter: [id number]      Impression/Plan:  Reji Farah is a 40y o  year old male with a history of diabetes mellitus (A1c 8 3) who presented to the [facility] ED 8/5 with hematemesis, abdominal pain, and diarrhea  He was septic on admission, and CT was compatible with enterocolitis  Enterocolitis   Concern for a viral or toxin-mediated etiology due to rapid onset of vomiting, abdominal cramping, and diarrhea  Could be a food borne illness related to his lunch or salad ingestion  His abdominal pain and vomiting have resolved, but he continues to have diarrhea  Enteric stool pathogen panel is negative  Fever and leukocytosis are improving              -continue supportive care with IVF              -will stop ciprofloxacin as stool infectious panel is negative (and 3 days is sufficient duration regardless)   -would like to see improvement in diarrhea prior to discharge     Sepsis  Patient with fever, leukocytosis with bandemia, and tachycardia on admission in the setting of enterocolitis              -improving    Hematemesis  Suspect a Le-Mehta tear due to excessive vomiting  GI consulted, will monitor Hgb and hold on EGD at this time  Vomiting is improved  -entiemetics as needed              -monitor Hgb     Diabetes mellitus  HbA1c 8 3  Monitor blood sugars     ZANDER  Likely prerenal due to hypotension, Gi losses              -Creatinine improved with IVF, continue to monitor      I have discussed the above management plan in detail with the primary service  ID will follow  Antibiotics:  Ciprofloxacin D3    Subjective:  Patient's vomiting and abdominal pain have resolved  He continues to have watery diarrhea, no fever or chills  He feels the diarrhea is a little better than yesterday      Objective:  Vitals:  Temp:  [97 7 °F (36 5 °C)-98 9 °F (37 2 °C)] 97 7 °F (36 5 °C)  HR:  [75-95] 83  Resp:  [16-18] 16  BP: (160-172)/() 172/90  SpO2:  [94 %-98 %] 98 %  Temp (24hrs), Av 2 °F (36 8 °C), Min:97 7 °F (36 5 °C), Max:98 9 °F (37 2 °C)  Current: Temperature: 97 7 °F (36 5 °C)    Physical Exam:   General Appearance:  Alert, interactive, nontoxic, no acute distress  Throat: Oropharynx moist without lesions  Lungs:   Clear to auscultation bilaterally; no wheezes, rhonchi or rales; respirations unlabored   Heart:  RRR; no murmur, rub or gallop   Abdomen:   Soft, non-tender, non-distended, positive bowel sounds  Extremities: No clubbing, cyanosis or edema   Skin: No new rashes or lesions  No draining wounds noted  Labs: All pertinent labs and imaging studies were personally reviewed  Results from last 7 days   Lab Units 21  0948 21  0458 21  2030   WBC Thousand/uL 8 93 10 81* 12 06*   HEMOGLOBIN g/dL 12 6 11 7* 12 4   PLATELETS Thousands/uL 256 249 249     Results from last 7 days   Lab Units 21  0438 21  0458 21  0500 21  2032   SODIUM mmol/L 140 140 143 141   POTASSIUM mmol/L 3 0* 3 2* 4 1 4 1   CHLORIDE mmol/L 113* 110* 113* 108   CO2 mmol/L 22 23 24 21   BUN mg/dL 5 6 14 19   CREATININE mg/dL 0 42* 0 60 1 07 1 32*   EGFR ml/min/1 73sq m 141 121 83 65   CALCIUM mg/dL 7 5* 8 3 8 1* 9 0   AST U/L  --  21 12 12   ALT U/L  --  25 19 21   ALK PHOS U/L  --  55 59 80     Results from last 7 days   Lab Units 21  0438 21  0909 21  0500 21  2042   PROCALCITONIN ng/ml 7 90* 21 47* 54 54* 2 22*                   Micro:  Results from last 7 days   Lab Units 21  2042   BLOOD CULTURE  No Growth at 48 hrs  No Growth at 48 hrs         Imaging:    CT A/P 21:  Findings as above compatible with infectious versus inflammatory enterocolitis   Fluid-filled large bowel loops consistent with diarrheal illness      Multiple dilated small bowel loops in the left upper quadrant with air-fluid levels and without discrete transition point likely reflecting secondary ileus versus obstruction      Urinary bladder wall thickening and mucosal hyperenhancement   Correlate for infectious etiology

## 2021-08-08 NOTE — ASSESSMENT & PLAN NOTE
Lab Results   Component Value Date    HGBA1C 8 3 (H) 08/06/2021       Recent Labs     08/07/21 2031 08/08/21  0059 08/08/21  0749 08/08/21  1122   POCGLU 200* 164* 215* 181*       Blood Sugar Average: Last 72 hrs:  (P) 165 [id number]     · Poorly-controlled diabetes most likely secondary to noncompliance  · Will hold oral hypoglycemic, pt reports he takes 45 units of lantus at home   · - however blood sugars not that high will order 20 units tonight = 8/8  · Continue Accu-Cheks and insulin sliding scale  · Was restarted on ada diet  , but still with significant diarrhea

## 2021-08-08 NOTE — PROGRESS NOTES
[address] [facility]  Progress Note Martinez Arellano 1975, 40 y o  male MRN: [id number]  Unit/Bed#: [facility] 605-01 Encounter: [id number]  Primary Care Provider: Renee Broderick DO   Date and time admitted to hospital: 8/5/2021  7:49 PM    * Severe sepsis [facility])  Assessment & Plan  · Patient with positive sepsis criteria of heart rate 125 ,temperature maximum 38 3C ,white blood cell count of 12 3 K and lactic acid 4 9  POA  · He endorses few times of hematemesis  Denied manuel abdominal pain, diarrhea or any other associated symptoms on admission   · However today pt reports diarrhea   · CT of abdomen and pelvis reported "Findings  Are compatible with infectious versus inflammatory enterocolitis  Fluid-filled large bowel loops consistent with diarrheal illness  Multiple dilated small bowel loops in the left upper quadrant with air-fluid levels and without discrete transition point likely reflecting secondary ileus versus obstruction "  · Patient received ceftriaxone and Flagyl, per ID discontinue Flagyl and ceftriaxone initiated Cipro - to cover for salmonella   · Procalcitonin level 2 22, bumped up to 54 54 --> 21 47--> 7 90  · Lactic acid dropped to 1 1 with IV hydration, resumee low dose iv fluids dt ongoing diarrhea  - pt reports still with no firming   · Will continue antibiotic for enteritis, patient also started on PPI IV transition to po   · Continue antiemetic  · Appreciate GI, ok per gi last Friday night to [place] home however this am pt thus far has had at least 7 episodes of diarrhea    · - stool bacterial panel sent this am r/o salmonella just returned this afternoon as negative for all components of test including salmonella   · - continue cipro iv to treat for now pt continues to improve with procal decreasing rapidly   · Appreciate ID       Type 2 diabetes mellitus with hyperglycemia, with long-term current use of insulin [facility])  Assessment & Plan  Lab Results   Component Value Date HGBA1C 8 3 (H) 08/06/2021       Recent Labs     08/07/21  2031 08/08/21  0059 08/08/21  0749 08/08/21  1122   POCGLU 200* 164* 215* 181*       Blood Sugar Average: Last 72 hrs:  (P) 165 [id number]     · Poorly-controlled diabetes most likely secondary to noncompliance  · Will hold oral hypoglycemic, pt reports he takes 45 units of lantus at home   · - however blood sugars not that high will order 20 units tonight = 8/8  · Continue Accu-Cheks and insulin sliding scale  · Was restarted on ada diet  , but still with significant diarrhea       Hypokalemia  Assessment & Plan  Secondary to emesis now resolved and ongoing diarrhea   · Replete as needed   · Today 40meq po bid for one day   · One time dose iv mag due to potassium   · chk labs in am     ZANDER (acute kidney injury) ([facility] [address] )  Assessment & Plan  · Creatinine went up to 1 32 from 0 54 on March 9 of 2021  · Will hold metformin  · Avoid hypotension, nephrotoxic drugs  · Resumed iv fluids at this time due to volume of diarrhea, pt lacks ability to hold it in he states it is just coming out he needs to wear a "diaper" it is that bad   · Continues to be liquid and frequent   · Repeat BMP in a m , improving    Hematemesis  Assessment & Plan  · Management as above  · No further episodes since admission  · Hemoglobin remains stable  · Check labs in a m ,    Smoking  Assessment & Plan  · Smoking cessation and nicotine patch ordered        VTE Pharmacologic Prophylaxis: VTE Score: 2 High Risk (Score >/= 5) - Pharmacological DVT Prophylaxis Contraindicated  Sequential Compression Devices Ordered  Patient Centered Rounds: I performed bedside rounds with nursing staff today  Discussions with Specialists or Other Care Team Provider: nursing     Education and Discussions with Family / Patient: Patient declined call to   Time Spent for Care: 45 minutes   More than 50% of total time spent on counseling and coordination of care as described above     Current Length of Stay: 2 day(s)  Current Patient Status: Inpatient   Certification Statement: The patient will continue to require additional inpatient hospital stay due to ongoing need for improved symptoms diarrhea and procal   Discharge Plan: Anticipate discharge in 24-48 hrs to home  Code Status: Level 1 - Full Code    Subjective:   Pt is able to tolerate diet but still continues to have diarrhea   He has no nausea or vomiting, he has no abdominal pain , he reports his kids handle the snakes all the time and they are never sick  He feels more likely due to the mushrooms  He still has uncontrolled diarrhea but feel that pt will likely be ok for discharge tomorrow if improved     Objective:     Vitals:   Temp (24hrs), Av 2 °F (36 8 °C), Min:97 7 °F (36 5 °C), Max:98 9 °F (37 2 °C)    Temp:  [97 7 °F (36 5 °C)-98 9 °F (37 2 °C)] 97 7 °F (36 5 °C)  HR:  [75-95] 83  Resp:  [16-18] 16  BP: (160-172)/() 172/90  SpO2:  [94 %-98 %] 98 %  Body mass index is 23 3 kg/m²  Input and Output Summary (last 24 hours): Intake/Output Summary (Last 24 hours) at 2021 1633  Last data filed at 2021 0900  Gross per 24 hour   Intake 480 ml   Output --   Net 480 ml       Physical Exam:   Physical Exam  Constitutional:       General: He is not in acute distress  Appearance: He is not ill-appearing, toxic-appearing or diaphoretic  HENT:      Head: Normocephalic and atraumatic  Eyes:      General:         Right eye: No discharge  Left eye: No discharge  Conjunctiva/sclera: Conjunctivae normal    Cardiovascular:      Rate and Rhythm: Normal rate  Heart sounds: No murmur heard  No friction rub  No gallop  Pulmonary:      Effort: No respiratory distress  Breath sounds: No stridor  No wheezing, rhonchi or rales  Chest:      Chest wall: No tenderness  Abdominal:      General: There is no distension  Palpations: There is no mass  Tenderness:  There is no abdominal tenderness  There is no right CVA tenderness, left CVA tenderness, guarding or rebound  Hernia: No hernia is present  Musculoskeletal:         General: No swelling, tenderness, deformity or signs of injury  Right lower leg: No edema  Left lower leg: No edema  Skin:     Coloration: Skin is not jaundiced or pale  Findings: No bruising, erythema, lesion or rash  Neurological:      Mental Status: He is alert and oriented to person, place, and time  Psychiatric:         Behavior: Behavior normal           Additional Data:     Labs:  Results from last 7 days   Lab Units 08/08/21  0948 08/06/21  0500 08/05/21 2032   WBC Thousand/uL 8 93  --  12 38*   HEMOGLOBIN g/dL 12 6  --  14 3   HEMATOCRIT % 35 5*  --  42 5   PLATELETS Thousands/uL 256  --  279   BANDS PCT %  --   --  35*   NEUTROS PCT % 65   < >  --    LYMPHS PCT % 24   < >  --    LYMPHO PCT %  --   --  4*   MONOS PCT % 9   < >  --    MONO PCT %  --   --  9   EOS PCT % 2   < > 0    < > = values in this interval not displayed       Results from last 7 days   Lab Units 08/08/21  0438 08/07/21  0458   SODIUM mmol/L 140 140   POTASSIUM mmol/L 3 0* 3 2*   CHLORIDE mmol/L 113* 110*   CO2 mmol/L 22 23   BUN mg/dL 5 6   CREATININE mg/dL 0 42* 0 60   ANION GAP mmol/L 5 7   CALCIUM mg/dL 7 5* 8 3   ALBUMIN g/dL  --  2 8*   TOTAL BILIRUBIN mg/dL  --  0 42   ALK PHOS U/L  --  55   ALT U/L  --  25   AST U/L  --  21   GLUCOSE RANDOM mg/dL 165* 142*     Results from last 7 days   Lab Units 08/06/21  0500   INR  1 04     Results from last 7 days   Lab Units 08/08/21  1122 08/08/21  0749 08/08/21  0059 08/07/21  2031 08/07/21  1714 08/07/21  1148 08/07/21  0745 08/07/21  0003 08/06/21  1831 08/06/21  1156 08/05/21  1956   POC GLUCOSE mg/dl 181* 215* 164* 200* 137 132 169* 137 95 151* 237*     Results from last 7 days   Lab Units 08/06/21  0500   HEMOGLOBIN A1C % 8 3*     Results from last 7 days   Lab Units 08/08/21  0438 08/07/21  2701 08/06/21  0500 08/05/21  2210 08/05/21 2042   LACTIC ACID mmol/L  --   --   --  1 1 4 9*   PROCALCITONIN ng/ml 7 90* 21 47* 54 54*  --  2 22*       Lines/Drains:  Invasive Devices     Peripheral Intravenous Line            Peripheral IV 08/07/21 Distal;Dorsal (posterior); Right Forearm 1 day                      Imaging: No pertinent imaging reviewed  Recent Cultures (last 7 days):   Results from last 7 days   Lab Units 08/05/21 2042   BLOOD CULTURE  No Growth at 48 hrs  No Growth at 48 hrs  Last 24 Hours Medication List:   Current Facility-Administered Medications   Medication Dose Route Frequency Provider Last Rate    acetaminophen  650 mg Oral Q6H PRN Suni Hernandez MD      gabapentin  600 mg Oral BID Suni Hernandez MD      insulin glargine  20 Units Subcutaneous HS VENKAT Crowley      insulin lispro  1-5 Units Subcutaneous 4x Daily (AC & HS) Edyta Aden PA-C      nicotine  1 patch Transdermal Daily Suni Hernandez MD      nicotine  21 mg Transdermal Once Edyta Aden PA-C      ondansetron  4 mg Intravenous Q6H PRN Suni Hernandez MD      pantoprazole  40 mg Oral Early Morning VENKAT Crowley      polyethylene glycol  17 g Oral Daily PRN Suni Hernandez MD      sodium chloride  100 mL/hr Intravenous Continuous VENKAT Crowley 100 mL/hr (08/07/21 1307)        Today, Patient Was Seen By: VENKAT Crowley    **Please Note: This note may have been constructed using a voice recognition system  **

## 2021-08-08 NOTE — ASSESSMENT & PLAN NOTE
· Creatinine went up to 1 32 from 0 54 on March 9 of 2021    · Will hold metformin  · Avoid hypotension, nephrotoxic drugs  · Resumed iv fluids at this time due to volume of diarrhea, pt lacks ability to hold it in he states it is just coming out he needs to wear a "diaper" it is that bad   · Continues to be liquid and frequent   · Repeat BMP in a m , improving

## 2021-08-08 NOTE — PLAN OF CARE
Problem: Potential for Falls  Goal: Patient will remain free of falls  Description: INTERVENTIONS:  - Educate patient/family on patient safety including physical limitations  - Instruct patient to call for assistance with activity   - Consult OT/PT to assist with strengthening/mobility   - Keep Call bell within reach  - Keep bed low and locked with side rails adjusted as appropriate  - Keep care items and personal belongings within reach  - Initiate and maintain comfort rounds  - Make Fall Risk Sign visible to staff  - Offer Toileting every  Hours, in advance of need  - Initiate/Maintain alarm  - Obtain necessary fall risk management equipment:  - Apply yellow socks and bracelet for high fall risk patients  - Consider moving patient to room near nurses station  Outcome: Progressing     Problem: GASTROINTESTINAL - ADULT  Goal: Minimal or absence of nausea and/or vomiting  Description: INTERVENTIONS:  - Administer IV fluids if ordered to ensure adequate hydration  - Maintain NPO status until nausea and vomiting are resolved  - Nasogastric tube if ordered  - Administer ordered antiemetic medications as needed  - Provide nonpharmacologic comfort measures as appropriate  - Advance diet as tolerated, if ordered  - Consider nutrition services referral to assist patient with adequate nutrition and appropriate food choices  Outcome: Progressing  Goal: Maintains or returns to baseline bowel function  Description: INTERVENTIONS:  - Assess bowel function  - Encourage oral fluids to ensure adequate hydration  - Administer IV fluids if ordered to ensure adequate hydration  - Administer ordered medications as needed  - Encourage mobilization and activity  - Consider nutritional services referral to assist patient with adequate nutrition and appropriate food choices  Outcome: Progressing     Problem: SKIN/TISSUE INTEGRITY - ADULT  Goal: Skin Integrity remains intact(Skin Breakdown Prevention)  Description: Assess:  -Perform Jose assessment every   -Clean and moisturize skin every  -Inspect skin when repositioning, toileting, and assisting with ADLS  -Assess under medical devices such as  every  -Assess extremities for adequate circulation and sensation     Bed Management:  -Have minimal linens on bed & keep smooth, unwrinkled  -Change linens as needed when moist or perspiring  -Avoid sitting or lying in one position for more than  hours while in bed  -Keep HOB atdegrees     Toileting:  -Offer bedside commode  -Assess for incontinence every  -Use incontinent care products after each incontinent episode such as    Activity:  -Mobilize patient  times a day  -Encourage activity and walks on unit  -Encourage or provide ROM exercises   -Turn and reposition patient every  Hours  -Use appropriate equipment to lift or move patient in bed  -Instruct/ Assist with weight shifting every when out of bed in chair  -Consider limitation of chair time  hour intervals    Skin Care:  -Avoid use of baby powder, tape, friction and shearing, hot water or constrictive clothing  -Relieve pressure over bony prominences using   -Do not massage red bony areas    Next Steps:  -Teach patient strategies to minimize risks such as   -Consider consults to  interdisciplinary teams such as   Outcome: Progressing     Problem: PAIN - ADULT  Goal: Verbalizes/displays adequate comfort level or baseline comfort level  Description: Interventions:  - Encourage patient to monitor pain and request assistance  - Assess pain using appropriate pain scale  - Administer analgesics based on type and severity of pain and evaluate response  - Implement non-pharmacological measures as appropriate and evaluate response  - Consider cultural and social influences on pain and pain management  - Notify physician/advanced practitioner if interventions unsuccessful or patient reports new pain  Outcome: Progressing     Problem: SAFETY ADULT  Goal: Patient will remain free of falls  Description: INTERVENTIONS:  - Educate patient/family on patient safety including physical limitations  - Instruct patient to call for assistance with activity   - Consult OT/PT to assist with strengthening/mobility   - Keep Call bell within reach  - Keep bed low and locked with side rails adjusted as appropriate  - Keep care items and personal belongings within reach  - Initiate and maintain comfort rounds  - Make Fall Risk Sign visible to staff  - Offer Toileting every  Hours, in advance of need  - Initiate/Maintaialarm  - Obtain necessary fall risk management equipment  - Apply yellow socks and bracelet for high fall risk patients  - Consider moving patient to room near nurses station  Outcome: Progressing     Problem: Nutrition/Hydration-ADULT  Goal: Nutrient/Hydration intake appropriate for improving, restoring or maintaining nutritional needs  Description: Monitor and assess patient's nutrition/hydration status for malnutrition  Collaborate with interdisciplinary team and initiate plan and interventions as ordered  Monitor patient's weight and dietary intake as ordered or per policy  Utilize nutrition screening tool and intervene as necessary  Determine patient's food preferences and provide high-protein, high-caloric foods as appropriate       INTERVENTIONS:  - Monitor oral intake, urinary output, labs, and treatment plans  - Assess nutrition and hydration status and recommend course of action  - Evaluate amount of meals eaten  - Assist patient with eating if necessary   - Allow adequate time for meals  - Recommend/ encourage appropriate diets, oral nutritional supplements, and vitamin/mineral supplements  - Order, calculate, and assess calorie counts as needed  - Recommend, monitor, and adjust tube feedings and TPN/PPN based on assessed needs  - Assess need for intravenous fluids  - Provide specific nutrition/hydration education as appropriate  - Include patient/family/caregiver in decisions related to nutrition  Outcome: Progressing

## 2021-08-08 NOTE — ASSESSMENT & PLAN NOTE
Secondary to emesis now resolved and ongoing diarrhea   · Replete as needed   · Today 40meq po bid for one day   · One time dose iv mag due to potassium   · chk labs in am

## 2021-08-08 NOTE — UTILIZATION REVIEW
Initial Clinical Review    Admission: Date/Time/Statement:   Admission Orders (From admission, onward)     Ordered        08/06/21 0056  Inpatient Admission  Once                   Orders Placed This Encounter   Procedures    Inpatient Admission     Standing Status:   Standing     Number of Occurrences:   1     Order Specific Question:   Level of Care     Answer:   Med Surg [16]     Order Specific Question:   Estimated length of stay     Answer:   More than 2 Midnights     Order Specific Question:   Certification     Answer:   I certify that inpatient services are medically necessary for this patient for a duration of greater than two midnights  See H&P and MD Progress Notes for additional information about the patient's course of treatment  ED Arrival Information     Expected Arrival Acuity    - 8/5/2021 19:49 Emergent         Means of arrival Escorted by Service Admission type    Ambulance [address] Emergency         Arrival complaint    vomiting        Chief Complaint   Patient presents with    Vomiting Blood     pt presents with EMS for vomiting blood and hypotension  Initial Presentation: 39 y/o male with PMhx of poorly controlled DM who presents to ED by EMS with hematemesis and abd pain  Pt states it started ~ 1 1/2 hr after eating his dinner  Also admits to several episodes of diarrhea  Per EMS patient syncopized and was hypotensive and bradycardic  EMS administered 20 mcg of epi with improvement of BP & HR  In ED T 101, tachycardic  WBC 12 38, lactic acid 4 9-->1  1  Procal 2 22  Cr 1 32, glucose 237  CT a/p showed enterocolitis  IV abx and IVF's given in ED with improvement in HR and LA     Admit inpatient to M/S unit with severe sepsis -- continue IV abx  PPI IV  Npo  IVF's  GI and ID consulted  Hold metformin  Avoid hypotension, nephrotoxic drugs  BMP in AM  Fingerstick glucose checks w/ ssi       GI consult 8/6 --  Patient had salad with mushrooms which he believes may have been spoiled  Afterwards he had dry heaves and emesis  He had single episode of coffee-ground emesis this morning which concerned him and therefore he went to the ED  Hgb has remained stable  Patient is hungry and feels well at this time  Will advance diet    ID consult 8/6 -- Enterocolitis, Sepsis -- Concern for a viral or bacterial etiology due to rapid onset of vomiting, abdominal cramping, and diarrhea  Could be a food borne illness related to his lunch or salad ingestion; would also consider salmonella since he owns and handles snakes  Given his fever, hypotension, and bandemia, agree with continuing antibiotics at this time while awaiting cultures  Continue IVF's  Stop metronidazole  Continue IV cipro  F/u on cxs  Collect a stool for enteric pathogen panel  Date: 8/6   Day 2: Procalcitonin level 2 22, bumped up to 54 54  Appreciate GI, recommend clear liquids today increased to regular diet this evening  - pt reports he feels hungry  Cr improving  D/c IVF's    BMP in the AM      ED Triage Vitals   Temperature Pulse Respirations Blood Pressure SpO2   08/05/21 1956 08/05/21 1952 08/05/21 1952 08/05/21 1953 08/05/21 1952   (!) 100 7 °F (38 2 °C) (!) 125 20 105/63 95 %      Temp Source Heart Rate Source Patient Position - Orthostatic VS BP Location FiO2 (%)   08/05/21 1956 08/05/21 1952 08/05/21 1952 08/05/21 1952 --   Tympanic Monitor Lying Right arm       Pain Score       08/07/21 0034       No Pain          Wt Readings from Last 1 Encounters:   08/05/21 63 5 kg (140 lb)     Additional Vital Signs:   Date/Time  Temp  Pulse  Resp  BP  MAP (mmHg)  SpO2  O2 Device   08/06/21 2300  --  --  --  148/90  --  --  --   08/06/21 22:52:04  97 8 °F (36 6 °C)  96  --  151/97  115  97 %  --   08/06/21 15:02:24  98 1 °F (36 7 °C)  100  19  126/74  91  96 %  --   08/06/21 10:52:22  98 °F (36 7 °C)  100  19  125/76  92  97 %  --   08/06/21 0823  --  --  --  --  --  96 %  None (Room air)   08/06/21 06:37:21  98 4 °F (36 9 °C)  108Abnormal   18  130/73  92  96 %  --   08/06/21 04:44:39  99 6 °F (37 6 °C)  108Abnormal   --  127/78  94  97 %  --   08/06/21 0300  --  110Abnormal   20  108/61  77  97 %  --   08/06/21 0100  --  116Abnormal   19  114/69  85  96 %  None (Room air)   08/06/21 0015  --  116Abnormal   19  122/67  --  96 %  None (Room air)   08/05/21 2230  --  118Abnormal   20  119/58  82  96 %  None (Room air)   08/05/21 2200  --  122Abnormal   18  122/72  90  97 %  None (Room air)   08/05/21 2115  --  122Abnormal   16  116/70  88  97 %  None (Room air)   08/05/21 2027  101 °F (38 3 °C)Abnormal   --  --  --  --  --  --   08/05/21 1956  100 7 °F (38 2 °C)Abnormal   --  --  --  --  --  --   08/05/21 1953  --  --  --  105/63  --  --  --   08/05/21 1952  --  125Abnormal                  Pertinent Labs/Diagnostic Test Results:   CT a/p 8/6 -- Findings as above compatible with infectious versus inflammatory enterocolitis   Fluid-filled large bowel loops consistent with diarrheal illness  Multiple dilated small bowel loops in the left upper quadrant with air-fluid levels and without discrete transition point likely reflecting secondary ileus versus obstruction  Urinary bladder wall thickening and mucosal hyperenhancement   Correlate for infectious etiology  CXR 8/6 - No acute cardiopulmonary disease         Results from last 7 days   Lab Units 08/06/21  2030 08/06/21  0500 08/05/21 2032   WBC Thousand/uL 12 06* 13 69* 12 38*   HEMOGLOBIN g/dL 12 4 13 3 14 3   HEMATOCRIT % 36 5 40 1 42 5   PLATELETS Thousands/uL 249 262 279   NEUTROS ABS Thousands/µL  --   --   --    BANDS PCT %  --   --  35*     Results from last 7 days   Lab Units 08/06/21  0500 08/05/21 2032   SODIUM mmol/L 143 141   POTASSIUM mmol/L 4 1 4 1   CHLORIDE mmol/L 113* 108   CO2 mmol/L 24 21   ANION GAP mmol/L 6 12   BUN mg/dL 14 19   CREATININE mg/dL 1 07 1 32*   EGFR ml/min/1 73sq m 83 65   CALCIUM mg/dL 8 1* 9 0   MAGNESIUM mg/dL 1 9 --    PHOSPHORUS mg/dL 4 2  --      Results from last 7 days   Lab Units 08/06/21  0500 08/05/21  2032   AST U/L 12 12   ALT U/L 19 21   ALK PHOS U/L 59 80   TOTAL PROTEIN g/dL 6 5 7 3   ALBUMIN g/dL 3 2* 3 8   TOTAL BILIRUBIN mg/dL 0 59 0 33   BILIRUBIN DIRECT mg/dL 0 14  --      Results from last 7 days   Lab Units 08/06/21  1831 08/06/21  1156 08/05/21  1956   POC GLUCOSE mg/dl 95 151* 237*     Results from last 7 days   Lab Units 08/06/21  0500 08/05/21  2032   GLUCOSE RANDOM mg/dL 151* 248*     Results from last 7 days   Lab Units 08/06/21  0500   HEMOGLOBIN A1C % 8 3*   EAG mg/dl 192     Results from last 7 days   Lab Units 08/06/21  0500 08/05/21  2032   PROTIME seconds 13 6 13 4   INR  1 04 1 02   PTT seconds  --  24     Results from last 7 days   Lab Units 08/06/21  0500 08/05/21  2042   PROCALCITONIN ng/ml 54 54* 2 22*     Results from last 7 days   Lab Units 08/05/21  2210 08/05/21  2042   LACTIC ACID mmol/L 1 1 4 9*     Results from last 7 days   Lab Units 08/05/21 2032   LIPASE u/L 90     Results from last 7 days   Lab Units 08/05/21  2042   BLOOD CULTURE  No Growth at 24 hrs  No Growth at 24 hrs       ED Treatment:   Medication Administration from 08/05/2021 1949 to 08/06/2021 0432       Date/Time Order Dose Route Action     08/05/2021 2033 sodium chloride 0 9 % bolus 1,000 mL 1,000 mL Intravenous New Bag     08/05/2021 2033 pantoprazole (PROTONIX) 80 mg in sodium chloride 0 9 % 100 mL IVPB 80 mg Intravenous New Bag     08/05/2021 2206 ceftriaxone (ROCEPHIN) 1 g/50 mL in dextrose IVPB 1,000 mg Intravenous New Bag     08/05/2021 2251 metroNIDAZOLE (FLAGYL) IVPB (premix) 500 mg 100 mL 500 mg Intravenous New Bag     08/05/2021 2206 sodium chloride 0 9 % bolus 1,000 mL 1,000 mL Intravenous New Bag     08/06/2021 0405 sodium chloride 0 9 % infusion 150 mL/hr Intravenous New Bag     08/06/2021 0316 sodium chloride 0 9 % bolus 500 mL 500 mL Intravenous New Bag     Past Medical History:   Diagnosis Date    Diabetes mellitus ([place] [address] )     type 2    Hypertension     Psychiatric disorder     depression     Present on Admission:   Severe sepsis ([place] [address] )   Smoking   Hematemesis   ZANDER (acute kidney injury) ([place] [address] )      Admitting Diagnosis: Hematemesis [K92 0]  Colitis [K52 9]  Vomiting [R11 10]  Sepsis ([place] [address] ) [A41 9]  Age/Sex: 40 y o  male  Admission Orders:  Scheduled Medications:  ciprofloxacin, 400 mg, Intravenous, Q12H  gabapentin, 600 mg, Oral, BID  insulin lispro, 1-5 Units, Subcutaneous, 4x Daily (AC & HS)  nicotine, 1 patch, Transdermal, Daily  pantoprazole, 40 mg, Oral, Early Morning    Continuous IV Infusions:  sodium chloride, 100 mL/hr, Intravenous, Continuous    PRN Meds:  acetaminophen, 650 mg, Oral, Q6H PRN  ondansetron, 4 mg, Intravenous, Q6H PRN  polyethylene glycol, 17 g, Oral, Daily PRN        IP CONSULT TO INFECTIOUS DISEASES  IP CONSULT TO GASTROENTEROLOGY    Network Utilization Review Department  ATTENTION: Please call with any questions or concerns to [phone] and carefully listen to the prompts so that you are directed to the right person  All voicemails are confidential   Lauren Arias all requests for admission clinical reviews, approved or denied determinations and any other requests to dedicated fax number below belonging to the campus where the patient is receiving treatment   List of dedicated fax numbers for the Facilities:  [address] DENIALS (Administrative/Medical Necessity) [phone]   [address] [place] [address] (Maternity/NICU/Pediatrics) 261 [facility],[address] [address] [place] [phone] [place] [address] [phone] [facility] [phone]

## 2021-08-09 VITALS
TEMPERATURE: 97.8 F | BODY MASS INDEX: 23.3 KG/M2 | WEIGHT: 140 LBS | OXYGEN SATURATION: 98 % | RESPIRATION RATE: 18 BRPM | SYSTOLIC BLOOD PRESSURE: 129 MMHG | DIASTOLIC BLOOD PRESSURE: 91 MMHG | HEART RATE: 78 BPM

## 2021-08-09 PROBLEM — R65.20 SEVERE SEPSIS (HCC): Status: RESOLVED | Noted: 2021-02-28 | Resolved: 2021-08-09

## 2021-08-09 PROBLEM — N17.9 AKI (ACUTE KIDNEY INJURY) (HCC): Status: RESOLVED | Noted: 2021-08-06 | Resolved: 2021-08-09

## 2021-08-09 PROBLEM — E87.6 HYPOKALEMIA: Status: RESOLVED | Noted: 2021-08-07 | Resolved: 2021-08-09

## 2021-08-09 PROBLEM — K92.0 HEMATEMESIS: Status: RESOLVED | Noted: 2021-08-06 | Resolved: 2021-08-09

## 2021-08-09 PROBLEM — A41.9 SEVERE SEPSIS (HCC): Status: RESOLVED | Noted: 2021-02-28 | Resolved: 2021-08-09

## 2021-08-09 LAB
ANION GAP SERPL CALCULATED.3IONS-SCNC: 2 MMOL/L (ref 4–13)
BASOPHILS # BLD AUTO: 0.04 THOUSANDS/ΜL (ref 0–0.1)
BASOPHILS NFR BLD AUTO: 1 % (ref 0–1)
BUN SERPL-MCNC: 8 MG/DL (ref 5–25)
CALCIUM SERPL-MCNC: 9.3 MG/DL (ref 8.3–10.1)
CHLORIDE SERPL-SCNC: 107 MMOL/L (ref 100–108)
CO2 SERPL-SCNC: 27 MMOL/L (ref 21–32)
CREAT SERPL-MCNC: 0.54 MG/DL (ref 0.6–1.3)
EOSINOPHIL # BLD AUTO: 0.22 THOUSAND/ΜL (ref 0–0.61)
EOSINOPHIL NFR BLD AUTO: 3 % (ref 0–6)
ERYTHROCYTE [DISTWIDTH] IN BLOOD BY AUTOMATED COUNT: 12.8 % (ref 11.6–15.1)
GFR SERPL CREATININE-BSD FRML MDRD: 127 ML/MIN/1.73SQ M
GLUCOSE SERPL-MCNC: 164 MG/DL (ref 65–140)
GLUCOSE SERPL-MCNC: 192 MG/DL (ref 65–140)
GLUCOSE SERPL-MCNC: 216 MG/DL (ref 65–140)
HCT VFR BLD AUTO: 38.2 % (ref 36.5–49.3)
HGB BLD-MCNC: 13.4 G/DL (ref 12–17)
IMM GRANULOCYTES # BLD AUTO: 0.07 THOUSAND/UL (ref 0–0.2)
IMM GRANULOCYTES NFR BLD AUTO: 1 % (ref 0–2)
LYMPHOCYTES # BLD AUTO: 2.72 THOUSANDS/ΜL (ref 0.6–4.47)
LYMPHOCYTES NFR BLD AUTO: 34 % (ref 14–44)
MCH RBC QN AUTO: 29.1 PG (ref 26.8–34.3)
MCHC RBC AUTO-ENTMCNC: 35.1 G/DL (ref 31.4–37.4)
MCV RBC AUTO: 83 FL (ref 82–98)
MONOCYTES # BLD AUTO: 0.75 THOUSAND/ΜL (ref 0.17–1.22)
MONOCYTES NFR BLD AUTO: 9 % (ref 4–12)
NEUTROPHILS # BLD AUTO: 4.26 THOUSANDS/ΜL (ref 1.85–7.62)
NEUTS SEG NFR BLD AUTO: 52 % (ref 43–75)
NRBC BLD AUTO-RTO: 0 /100 WBCS
PLATELET # BLD AUTO: 300 THOUSANDS/UL (ref 149–390)
PMV BLD AUTO: 9.2 FL (ref 8.9–12.7)
POTASSIUM SERPL-SCNC: 3.8 MMOL/L (ref 3.5–5.3)
RBC # BLD AUTO: 4.6 MILLION/UL (ref 3.88–5.62)
SODIUM SERPL-SCNC: 136 MMOL/L (ref 136–145)
WBC # BLD AUTO: 8.06 THOUSAND/UL (ref 4.31–10.16)

## 2021-08-09 PROCEDURE — 99231 SBSQ HOSP IP/OBS SF/LOW 25: CPT | Performed by: STUDENT IN AN ORGANIZED HEALTH CARE EDUCATION/TRAINING PROGRAM

## 2021-08-09 PROCEDURE — 80048 BASIC METABOLIC PNL TOTAL CA: CPT | Performed by: NURSE PRACTITIONER

## 2021-08-09 PROCEDURE — 99239 HOSP IP/OBS DSCHRG MGMT >30: CPT | Performed by: NURSE PRACTITIONER

## 2021-08-09 PROCEDURE — 85025 COMPLETE CBC W/AUTO DIFF WBC: CPT | Performed by: NURSE PRACTITIONER

## 2021-08-09 PROCEDURE — 82948 REAGENT STRIP/BLOOD GLUCOSE: CPT

## 2021-08-09 RX ORDER — INSULIN LISPRO 100 [IU]/ML
3 INJECTION, SOLUTION INTRAVENOUS; SUBCUTANEOUS
Qty: 15 ML | Refills: 0
Start: 2021-08-09

## 2021-08-09 RX ORDER — PANTOPRAZOLE SODIUM 40 MG/1
40 TABLET, DELAYED RELEASE ORAL
Qty: 30 TABLET | Refills: 0 | Status: SHIPPED | OUTPATIENT
Start: 2021-08-10 | End: 2021-08-09

## 2021-08-09 RX ORDER — INSULIN GLARGINE 100 [IU]/ML
20 INJECTION, SOLUTION SUBCUTANEOUS DAILY
Qty: 15 ML | Refills: 0
Start: 2021-08-09

## 2021-08-09 RX ORDER — PANTOPRAZOLE SODIUM 40 MG/1
40 TABLET, DELAYED RELEASE ORAL
Qty: 30 TABLET | Refills: 0 | Status: SHIPPED | OUTPATIENT
Start: 2021-08-10

## 2021-08-09 RX ADMIN — GABAPENTIN 600 MG: 300 CAPSULE ORAL at 08:54

## 2021-08-09 RX ADMIN — INSULIN LISPRO 1 UNITS: 100 INJECTION, SOLUTION INTRAVENOUS; SUBCUTANEOUS at 08:54

## 2021-08-09 RX ADMIN — PANTOPRAZOLE SODIUM 40 MG: 40 TABLET, DELAYED RELEASE ORAL at 09:00

## 2021-08-09 RX ADMIN — NICOTINE 1 PATCH: 21 PATCH, EXTENDED RELEASE TRANSDERMAL at 08:56

## 2021-08-09 NOTE — DISCHARGE INSTR - AVS FIRST PAGE
We have adjusted your insulin based on your blood sugars here in the hospital   As your appetite improves, you can likely go back to your prior insulin dosing  Please keep an eye on your blood sugars before meals and at bedtime and consult with your family doctor regarding additional adjustments

## 2021-08-09 NOTE — DISCHARGE SUMMARY
Discharge Summary - [address] [facility] Medicine    Patient Information: Ayush Canchola 40 y o  male MRN: [id number]  Unit/Bed#: [facility] 605-01 Encounter: [id number]    Discharging Physician / Practitioner: VENKAT Lawrence  PCP: Sherrill Mendoza DO  Admission Date: 8/5/2021  Discharge Date: 08/09/21    Reason for Admission:  Sepsis, Enterocolitis, hematemesis    Discharge Diagnoses:     Principal Problem (Resolved):    Severe sepsis ([facility] )  Active Problems:    Type 2 diabetes mellitus with hyperglycemia, with long-term current use of insulin ([facility] )    Smoking  Resolved Problems:    Hematemesis    ZANDER (acute kidney injury) ([facility] )    Hypokalemia      Consultations During Hospital Stay:  · Infectious disease  · Gastroenterology    Procedures Performed:     · None    Significant Findings / Test Results:     · Chest x-ray negative   · CT abdomen pelvis Findings as above compatible with infectious versus inflammatory enterocolitis  Fluid-filled large bowel loops consistent with diarrheal illness  Multiple dilated small bowel loops in the left upper quadrant with air-fluid levels and without discrete transition point likely reflecting secondary ileus versus obstruction  Urinary bladder wall thickening and mucosal hyperenhancement  Correlate for infectious etiology  · Blood cultures negative at 72 hours  · Bacterial stool panel negative    Incidental Findings:   · None     Test Results Pending at Discharge (will require follow up): None  Outpatient Tests Requested:  · Outpatient follow-up with PCP within 1 week    Complications:  None    Hospital Course:     Ayush Canchola is a 40 y o  male patient with past medical history of diabetes type 2 in tobacco use who originally presented to the hospital on 8/5/2021 due to rapid onset of vomiting, abdominal cramping and diarrhea after eating lunch  CT scan showed enterocolitis  He was seen by ED, concern for viral or toxin mediated etiology due to rapid onset symptoms    He was treated with intravenous ciprofloxacin  Bacterial stool panel was negative  He is improving, has had no further diarrhea today and would like to be discharged  He was also seen by a Gastroenterology secondary to hematemesis which is likely Le-Mehta tear in the setting of excessive vomiting  Will continue PPI course for 1 month  Insulin was adjusted while in hospital secondary to blood sugars at goal   As appetite and symptoms improved, suspect patient can go back to prior dosing  Should monitor blood sugars at home and follow-up with PCP  Condition at Discharge: stable     Discharge Day Visit / Exam:     Subjective:  Patient offers no acute complaints  No further diarrhea  Tolerated breakfast   No nausea or vomiting  Vitals: Blood Pressure: 129/91 (08/09/21 0743)  Pulse: 78 (08/09/21 0743)  Temperature: 97 8 °F (36 6 °C) (08/09/21 0743)  Temp Source: Rectal (08/05/21 2027)  Respirations: 18 (08/09/21 0743)  Weight - Scale: 63 5 kg (140 lb) (08/05/21 1952)  SpO2: 98 % (08/09/21 0743)     Exam:   Physical Exam  Vitals and nursing note reviewed  Constitutional:       Appearance: He is obese  Cardiovascular:      Rate and Rhythm: Normal rate  Pulmonary:      Breath sounds: Normal breath sounds  Abdominal:      Tenderness: There is no abdominal tenderness  Musculoskeletal:         General: No swelling  Skin:     General: Skin is warm  Neurological:      Mental Status: He is alert and oriented to person, place, and time  Mental status is at baseline  Psychiatric:         Mood and Affect: Mood normal          Discussion with Family:  Patient    Discharge instructions/Information to patient and family:   See after visit summary for information provided to patient and family  Provisions for Follow-Up Care:  See after visit summary for information related to follow-up care and any pertinent home health orders        Disposition:     Home    For Discharges to [facility] SNF: · Not Applicable to this Patient - Not Applicable to this Patient    Planned Readmission: no     Discharge Statement:  I spent 40 minutes discharging the patient  This time was spent on the day of discharge  I had direct contact with the patient on the day of discharge  Greater than 50% of the total time was spent examining patient, answering all patient questions, arranging and discussing plan of care with patient as well as directly providing post-discharge instructions  Additional time then spent on discharge activities  Discharge Medications:  See after visit summary for reconciled discharge medications provided to patient and family        ** Please Note: This note has been constructed using a voice recognition system **

## 2021-08-09 NOTE — PROGRESS NOTES
Progress Note - Infectious Disease   Zayra Esteban 40 y o  male MRN: [id number]  [facility] Encounter: [id number]      Impression/Plan:  Madison Macias is a 40y o  year old male with a history of diabetes mellitus (A1c 8 3) who presented to the [facility] ED  with hematemesis, abdominal pain, and diarrhea  He was septic on admission, and CT was compatible with enterocolitis  Enterocolitis   Concern for a viral or toxin-mediated etiology due to rapid onset of vomiting, abdominal cramping, and diarrhea  Could be a food borne illness related to his lunch or salad ingestion  Enteric stool pathogen panel is negative  Fever and leukocytosis have improved, along with his symptoms              -encouraged continued hydration at home, can take immodium if needed     Sepsis  Patient with fever, leukocytosis with bandemia, and tachycardia on admission in the setting of enterocolitis              -resolved    Hematemesis  Suspect a Le-Mehta tear due to excessive vomiting  GI consulted, will monitor Hgb and hold on EGD at this time  Vomiting is improved  -entiemetics as needed, PPI     Diabetes mellitus  HbA1c 8 3  Monitor blood sugars     ZANDER  Likely prerenal due to hypotension, Gi losses              -Creatinine improved with IVF, continue to monitor      I have discussed the above management plan in detail with the primary service  Okay to discharge from ID perspective  Antibiotics:  S/p Ciprofloxacin D3    Subjective:  Patient reports improvement in his diarrhea  He is tolerating PO intake with no vomiting or abdominal pain  Denies fever or chills       Objective:  Vitals:  Temp:  [97 7 °F (36 5 °C)-98 7 °F (37 1 °C)] 97 8 °F (36 6 °C)  HR:  [78-88] 78  Resp:  [16-18] 18  BP: (129-175)/() 129/91  SpO2:  [95 %-98 %] 98 %  Temp (24hrs), Av 1 °F (36 7 °C), Min:97 7 °F (36 5 °C), Max:98 7 °F (37 1 °C)  Current: Temperature: 97 8 °F (36 6 °C)    Physical Exam:   General Appearance: Alert, interactive, nontoxic, no acute distress  Throat: Oropharynx moist without lesions  Lungs:   Clear to auscultation bilaterally; no wheezes, rhonchi or rales; respirations unlabored   Heart:  RRR; no murmur, rub or gallop   Abdomen:   Soft, non-tender, non-distended, positive bowel sounds  Extremities: No clubbing, cyanosis or edema   Skin: No new rashes or lesions  No draining wounds noted  Labs: All pertinent labs and imaging studies were personally reviewed  Results from last 7 days   Lab Units 08/09/21  0439 08/08/21  0948 08/07/21  0458   WBC Thousand/uL 8 06 8 93 10 81*   HEMOGLOBIN g/dL 13 4 12 6 11 7*   PLATELETS Thousands/uL 300 256 249     Results from last 7 days   Lab Units 08/09/21  0439 08/08/21  0438 08/07/21  0458 08/06/21  0500 08/05/21  2032   SODIUM mmol/L 136 140 140 143 141   POTASSIUM mmol/L 3 8 3 0* 3 2* 4 1 4 1   CHLORIDE mmol/L 107 113* 110* 113* 108   CO2 mmol/L 27 22 23 24 21   BUN mg/dL 8 5 6 14 19   CREATININE mg/dL 0 54* 0 42* 0 60 1 07 1 32*   EGFR ml/min/1 73sq m 127 141 121 83 65   CALCIUM mg/dL 9 3 7 5* 8 3 8 1* 9 0   AST U/L  --   --  21 12 12   ALT U/L  --   --  25 19 21   ALK PHOS U/L  --   --  55 59 80     Results from last 7 days   Lab Units 08/08/21  0438 08/07/21  0909 08/06/21  0500 08/05/21  2042   PROCALCITONIN ng/ml 7 90* 21 47* 54 54* 2 22*                   Micro:  Results from last 7 days   Lab Units 08/05/21  2042   BLOOD CULTURE  No Growth at 72 hrs  No Growth at 72 hrs       Stool Enteric panel negative    Imaging:    CT A/P 8/5/21:  Findings as above compatible with infectious versus inflammatory enterocolitis   Fluid-filled large bowel loops consistent with diarrheal illness      Multiple dilated small bowel loops in the left upper quadrant with air-fluid levels and without discrete transition point likely reflecting secondary ileus versus obstruction      Urinary bladder wall thickening and mucosal hyperenhancement   Correlate for infectious etiology

## 2021-08-09 NOTE — UTILIZATION REVIEW
Inpatient Admission Authorization Request   NOTIFICATION OF INPATIENT ADMISSION/INPATIENT AUTHORIZATION REQUEST   SERVICING FACILITY:   [facility]  Address: [address] [place] [address], [address] [place] [address]  Tax ID: [id number]  NPI: [id number]  Place of Service: Inpatient [address] [facility] Code: 24     ATTENDING PROVIDER:  Attending Name and NPI#: Justice Lovelace Md [[id number]]  Address: [address] [place] [address], [address] [place] [address]  Phone: [phone]     UTILIZATION REVIEW CONTACT:  Isabella Johnson Utilization   Network Utilization Review Department  Phone: [phone]  Fax: [phone]  Email: [email or web address]     PHYSICIAN ADVISORY SERVICES:  FOR IJFZ-NX-ERQY REVIEW - MEDICAL NECESSITY DENIAL  Phone: [phone]  Fax: [phone]  Email: [email or web address]     TYPE OF REQUEST:  Inpatient Status     ADMISSION INFORMATION:  ADMISSION DATE/TIME: 8/6/21 12:56 AM  PATIENT DIAGNOSIS CODE/DESCRIPTION:  Hematemesis [K92 0]  Colitis [K52 9]  Vomiting [R11 10]  Sepsis ([facility] [address] ) [A41 9]  DISCHARGE DATE/TIME: No discharge date for patient encounter  DISCHARGE DISPOSITION (IF DISCHARGED): Home/Self Care     IMPORTANT INFORMATION:  Please contact the Isabella Johnson directly with any questions or concerns regarding this request  Department voicemails are confidential     Send requests for admission clinical reviews, concurrent reviews, approvals, and administrative denials due to lack of clinical to fax [phone]           Initial Clinical Review    Admission: Date/Time/Statement:   Admission Orders (From admission, onward)     Ordered        08/06/21 0056  Inpatient Admission  Once                   Orders Placed This Encounter   Procedures    Inpatient Admission     Standing Status:   Standing     Number of Occurrences:   1     Order Specific Question:   Level of Care     Answer:   Med Surg [16]     Order Specific Question:   Estimated length of stay Answer:   More than 2 Midnights     Order Specific Question:   Certification     Answer:   I certify that inpatient services are medically necessary for this patient for a duration of greater than two midnights  See H&P and MD Progress Notes for additional information about the patient's course of treatment  ED Arrival Information     Expected Arrival Acuity    - 8/5/2021 19:49 Emergent         Means of arrival Escorted by Service Admission type    Ambulance [facility] [address] [facility]         Arrival complaint    vomiting        Chief Complaint   Patient presents with    Vomiting Blood     pt presents with EMS for vomiting blood and hypotension  Initial Presentation: 41 y/o male with PMhx of poorly controlled DM who presents to ED by EMS with hematemesis and abd pain  Pt states it started ~ 1 1/2 hr after eating his dinner  Also admits to several episodes of diarrhea  Per EMS patient syncopized and was hypotensive and bradycardic  EMS administered 20 mcg of epi with improvement of BP & HR  In ED T 101, tachycardic  WBC 12 38, lactic acid 4 9-->1  1  Procal 2 22  Cr 1 32, glucose 237  CT a/p showed enterocolitis  IV abx and IVF's given in ED with improvement in HR and LA     Admit inpatient to M/S unit with severe sepsis -- continue IV abx  PPI IV  Npo  IVF's  GI and ID consulted  Hold metformin  Avoid hypotension, nephrotoxic drugs  BMP in AM  Fingerstick glucose checks w/ ssi  GI consult 8/6 --  Patient had salad with mushrooms which he believes may have been spoiled  Afterwards he had dry heaves and emesis  He had single episode of coffee-ground emesis this morning which concerned him and therefore he went to the ED  Hgb has remained stable  Patient is hungry and feels well at this time  Will advance diet    ID consult 8/6 -- Enterocolitis, Sepsis -- Concern for a viral or bacterial etiology due to rapid onset of vomiting, abdominal cramping, and diarrhea   Could be a food borne illness related to his lunch or salad ingestion; would also consider salmonella since he owns and handles snakes  Given his fever, hypotension, and bandemia, agree with continuing antibiotics at this time while awaiting cultures  Continue IVF's  Stop metronidazole  Continue IV cipro  F/u on cxs  Collect a stool for enteric pathogen panel  Date: 8/6   Day 2: Procalcitonin level 2 22, bumped up to 54 54  Appreciate GI, recommend clear liquids today increased to regular diet this evening  - pt reports he feels hungry  Cr improving  D/c IVF's    BMP in the AM      ED Triage Vitals   Temperature Pulse Respirations Blood Pressure SpO2   08/05/21 1956 08/05/21 1952 08/05/21 1952 08/05/21 1953 08/05/21 1952   (!) 100 7 °F (38 2 °C) (!) 125 20 105/63 95 %      Temp Source Heart Rate Source Patient Position - Orthostatic VS BP Location FiO2 (%)   08/05/21 1956 08/05/21 1952 08/05/21 1952 08/05/21 1952 --   Tympanic Monitor Lying Right arm       Pain Score       08/07/21 0034       No Pain          Wt Readings from Last 1 Encounters:   08/05/21 63 5 kg (140 lb)     Additional Vital Signs:   Date/Time  Temp  Pulse  Resp  BP  MAP (mmHg)  SpO2  O2 Device   08/06/21 2300  --  --  --  148/90  --  --  --   08/06/21 22:52:04  97 8 °F (36 6 °C)  96  --  151/97  115  97 %  --   08/06/21 15:02:24  98 1 °F (36 7 °C)  100  19  126/74  91  96 %  --   08/06/21 10:52:22  98 °F (36 7 °C)  100  19  125/76  92  97 %  --   08/06/21 0823  --  --  --  --  --  96 %  None (Room air)   08/06/21 06:37:21  98 4 °F (36 9 °C)  108Abnormal   18  130/73  92  96 %  --   08/06/21 04:44:39  99 6 °F (37 6 °C)  108Abnormal   --  127/78  94  97 %  --   08/06/21 0300  --  110Abnormal   20  108/61  77  97 %  --   08/06/21 0100  --  116Abnormal   19  114/69  85  96 %  None (Room air)   08/06/21 0015  --  116Abnormal   19  122/67  --  96 %  None (Room air)   08/05/21 2230  --  118Abnormal   20  119/58  82  96 %  None (Room air)   08/05/21 2200  -- 122Abnormal   18  122/72  90  97 %  None (Room air)   08/05/21 2115  --  122Abnormal   16  116/70  88  97 %  None (Room air)   08/05/21 2027  101 °F (38 3 °C)Abnormal   --  --  --  --  --  --   08/05/21 1956  100 7 °F (38 2 °C)Abnormal   --  --  --  --  --  --   08/05/21 1953  --  --  --  105/63  --  --  --   08/05/21 1952  --  125Abnormal                  Pertinent Labs/Diagnostic Test Results:   CT a/p 8/6 -- Findings as above compatible with infectious versus inflammatory enterocolitis   Fluid-filled large bowel loops consistent with diarrheal illness  Multiple dilated small bowel loops in the left upper quadrant with air-fluid levels and without discrete transition point likely reflecting secondary ileus versus obstruction  Urinary bladder wall thickening and mucosal hyperenhancement   Correlate for infectious etiology  CXR 8/6 - No acute cardiopulmonary disease         Results from last 7 days   Lab Units 08/06/21 2030 08/06/21  0500 08/05/21 2032   WBC Thousand/uL 12 06* 13 69* 12 38*   HEMOGLOBIN g/dL 12 4 13 3 14 3   HEMATOCRIT % 36 5 40 1 42 5   PLATELETS Thousands/uL 249 262 279   NEUTROS ABS Thousands/µL  --   --   --    BANDS PCT %  --   --  35*     Results from last 7 days   Lab Units 08/06/21  0500 08/05/21 2032   SODIUM mmol/L 143 141   POTASSIUM mmol/L 4 1 4 1   CHLORIDE mmol/L 113* 108   CO2 mmol/L 24 21   ANION GAP mmol/L 6 12   BUN mg/dL 14 19   CREATININE mg/dL 1 07 1 32*   EGFR ml/min/1 73sq m 83 65   CALCIUM mg/dL 8 1* 9 0   MAGNESIUM mg/dL 1 9  --    PHOSPHORUS mg/dL 4 2  --      Results from last 7 days   Lab Units 08/06/21  0500 08/05/21 2032   AST U/L 12 12   ALT U/L 19 21   ALK PHOS U/L 59 80   TOTAL PROTEIN g/dL 6 5 7 3   ALBUMIN g/dL 3 2* 3 8   TOTAL BILIRUBIN mg/dL 0 59 0 33   BILIRUBIN DIRECT mg/dL 0 14  --      Results from last 7 days   Lab Units 08/06/21  1831 08/06/21  1156 08/05/21  1956   POC GLUCOSE mg/dl 95 151* 237*     Results from last 7 days   Lab Units 08/06/21  0500 08/05/21 2032   GLUCOSE RANDOM mg/dL 151* 248*     Results from last 7 days   Lab Units 08/06/21  0500   HEMOGLOBIN A1C % 8 3*   EAG mg/dl 192     Results from last 7 days   Lab Units 08/06/21  0500 08/05/21 2032   PROTIME seconds 13 6 13 4   INR  1 04 1 02   PTT seconds  --  24     Results from last 7 days   Lab Units 08/06/21  0500 08/05/21 2042   PROCALCITONIN ng/ml 54 54* 2 22*     Results from last 7 days   Lab Units 08/05/21  2210 08/05/21 2042   LACTIC ACID mmol/L 1 1 4 9*     Results from last 7 days   Lab Units 08/05/21 2032   LIPASE u/L 90     Results from last 7 days   Lab Units 08/05/21 2042   BLOOD CULTURE  No Growth at 72 hrs  No Growth at 72 hrs       ED Treatment:   Medication Administration from 08/05/2021 1949 to 08/06/2021 9129       Date/Time Order Dose Route Action     08/05/2021 2033 sodium chloride 0 9 % bolus 1,000 mL 1,000 mL Intravenous New Bag     08/05/2021 2033 pantoprazole (PROTONIX) 80 mg in sodium chloride 0 9 % 100 mL IVPB 80 mg Intravenous New Bag     08/05/2021 2206 ceftriaxone (ROCEPHIN) 1 g/50 mL in dextrose IVPB 1,000 mg Intravenous New Bag     08/05/2021 2251 metroNIDAZOLE (FLAGYL) IVPB (premix) 500 mg 100 mL 500 mg Intravenous New Bag     08/05/2021 2206 sodium chloride 0 9 % bolus 1,000 mL 1,000 mL Intravenous New Bag     08/06/2021 0405 sodium chloride 0 9 % infusion 150 mL/hr Intravenous New Bag     08/06/2021 0316 sodium chloride 0 9 % bolus 500 mL 500 mL Intravenous New Bag     Past Medical History:   Diagnosis Date    Diabetes mellitus ([place] [address] )     type 2    Hypertension     Psychiatric disorder     depression     Present on Admission:   Severe sepsis ([facility] )   Smoking   Hematemesis   ZANDER (acute kidney injury) ([facility] )      Admitting Diagnosis: Hematemesis [K92 0]  Colitis [K52 9]  Vomiting [R11 10]  Sepsis ([place] [address] ) [A41 9]  Age/Sex: 40 y o  male  Admission Orders:  Scheduled Medications:  gabapentin, 600 mg, Oral, BID  insulin glargine, 20 Units, Subcutaneous, HS  insulin lispro, 1-5 Units, Subcutaneous, 4x Daily (AC & HS)  nicotine, 1 patch, Transdermal, Daily  pantoprazole, 40 mg, Oral, Early Morning    Continuous IV Infusions:  sodium chloride, 100 mL/hr, Intravenous, Continuous    PRN Meds:  acetaminophen, 650 mg, Oral, Q6H PRN  ondansetron, 4 mg, Intravenous, Q6H PRN  polyethylene glycol, 17 g, Oral, Daily PRN        IP CONSULT TO INFECTIOUS DISEASES  IP CONSULT TO GASTROENTEROLOGY    Network Utilization Review Department  ATTENTION: Please call with any questions or concerns to [phone] and carefully listen to the prompts so that you are directed to the right person  All voicemails are confidential   Claire Jo all requests for admission clinical reviews, approved or denied determinations and any other requests to dedicated fax number below belonging to the campus where the patient is receiving treatment   List of dedicated fax numbers for the Facilities:  [address] DENIALS (Administrative/Medical Necessity) [phone]   [address] (Maternity/NICU/Pediatrics) [phone]   [address] [place] [address] [address] [place] [address] [address] [facility] [phone]

## 2021-08-10 NOTE — UTILIZATION REVIEW
Notification of Discharge   This is a Notification of Discharge from our facility [address]  Please be advised that this patient has been discharge from our facility  Below you will find the admission and discharge date and time including the patients disposition  UTILIZATION REVIEW CONTACT:  [facility]  Utilization   Network Utilization Review Department  Phone: [phone] x carefully listen to the prompts  All voicemails are confidential   Email: [email or web address]     PHYSICIAN ADVISORY SERVICES:  FOR [id number] REVIEW - MEDICAL NECESSITY DENIAL  Phone: [phone]  Fax: [phone]  Email: [email or web address]     PRESENTATION DATE: 8/5/2021  7:49 PM  OBERVATION ADMISSION DATE:   INPATIENT ADMISSION DATE: 8/6/21 12:56 AM   DISCHARGE DATE: 8/9/2021  1:17 PM  DISPOSITION: Home/Self Care Home/Self Care      IMPORTANT INFORMATION:  Send all requests for admission clinical reviews, approved or denied determinations and any other requests to dedicated fax number below belonging to the campus where the patient is receiving treatment   List of dedicated fax numbers:  [address] DENIALS (Administrative/Medical Necessity) [phone]   [address] (Maternity/NICU/Pediatrics) [phone]   Kathya Pascual [phone]   Bevin Angelucci [phone]   Lulu Carlson [phone]   Negrita Pate [facility] [address] [facility] [phone]   [facility]  [phone]   [address] [place] [address], [facility]  2401 [facility] 1000 [address] [facility] [phone]

## 2021-08-10 NOTE — ED ATTENDING ATTESTATION
spent personally by me on the following activities:  Blood draw for specimens, obtaining history from patient or surrogate, re-evaluation of patient's condition, review of old charts, evaluation of patient's response to treatment, examination of patient, ordering and review of laboratory studies, ordering and performing treatments and interventions, development of treatment plan with patient or surrogate and ordering and review of radiographic studies

## 2021-08-11 LAB
BACTERIA BLD CULT: NORMAL
BACTERIA BLD CULT: NORMAL

## 2021-08-30 NOTE — ASSESSMENT & PLAN NOTE
Lab Results   Component Value Date    HGBA1C >14 0 (H) 03/01/2021       Recent Labs     03/03/21  2103 03/04/21  0640 03/04/21  1114 03/04/21  1616   POCGLU 267* 281* 207* 186*       Blood Sugar Average: Last 72 hrs:  (P) 274 [id number]   · Poorly-controlled  · Cpeptide is low, likely pancreatic failure  · Noncompliance due to depression  · Endocrinology following, adjusting basal bolus insulin Statement Selected

## 2022-10-12 PROBLEM — N12 PYELONEPHRITIS: Status: RESOLVED | Noted: 2021-02-28 | Resolved: 2022-10-12

## 2022-12-22 ENCOUNTER — DOCTOR'S OFFICE (OUTPATIENT)
Dept: URBAN - NONMETROPOLITAN AREA CLINIC 1 | Facility: CLINIC | Age: 47
Setting detail: OPHTHALMOLOGY
End: 2022-12-22
Payer: COMMERCIAL

## 2022-12-22 DIAGNOSIS — H25.043: ICD-10-CM

## 2022-12-22 DIAGNOSIS — H52.4: ICD-10-CM

## 2022-12-22 DIAGNOSIS — E11.3311: ICD-10-CM

## 2022-12-22 DIAGNOSIS — E11.3392: ICD-10-CM

## 2022-12-22 PROCEDURE — 99204 OFFICE O/P NEW MOD 45 MIN: CPT

## 2022-12-22 PROCEDURE — 92134 CPTRZ OPH DX IMG PST SGM RTA: CPT

## 2022-12-22 PROCEDURE — 92015 DETERMINE REFRACTIVE STATE: CPT

## 2022-12-22 ASSESSMENT — TONOMETRY
OD_IOP_MMHG: 14
OS_IOP_MMHG: 14

## 2022-12-22 ASSESSMENT — CONFRONTATIONAL VISUAL FIELD TEST (CVF)
OD_FINDINGS: FULL
OS_FINDINGS: FULL

## 2022-12-23 PROBLEM — E11.3392 TYPE 2 DIABETES MELLITUS WITH MODERATE NONPROLIFERATIVE DIABETIC RETINOPATHY WITHOUT MACULAR EDEMA; LEFT EYE: Status: ACTIVE | Noted: 2022-12-22

## 2022-12-23 PROBLEM — H52.4 PRESBYOPIA: Status: ACTIVE | Noted: 2022-12-22

## 2022-12-23 PROBLEM — H25.043: Status: ACTIVE | Noted: 2022-12-22

## 2022-12-23 ASSESSMENT — REFRACTION_MANIFEST
OS_SPHERE: -1.50
OS_ADD: +1.50
OD_VA2: 20/40
OD_AXIS: 060
OD_CYLINDER: -0.50
OD_VA1: 20/40
OS_CYLINDER: SPH
OD_ADD: +1.50
OU_VA: 20/40
OS_VA1: 20/40
OS_VA2: 20/40
OD_SPHERE: -1.25

## 2022-12-23 ASSESSMENT — VISUAL ACUITY
OD_BCVA: 20/40-1
OS_BCVA: 20/60-2

## 2022-12-23 ASSESSMENT — SPHEQUIV_DERIVED: OD_SPHEQUIV: -1.5

## 2023-01-16 ENCOUNTER — DOCTOR'S OFFICE (OUTPATIENT)
Dept: URBAN - NONMETROPOLITAN AREA CLINIC 1 | Facility: CLINIC | Age: 48
Setting detail: OPHTHALMOLOGY
End: 2023-01-16
Payer: COMMERCIAL

## 2023-01-16 DIAGNOSIS — E11.3392: ICD-10-CM

## 2023-01-16 DIAGNOSIS — E11.3311: ICD-10-CM

## 2023-01-16 PROCEDURE — 92202 OPSCPY EXTND ON/MAC DRAW: CPT | Performed by: OPHTHALMOLOGY

## 2023-01-16 PROCEDURE — 99213 OFFICE O/P EST LOW 20 MIN: CPT | Performed by: OPHTHALMOLOGY

## 2023-01-16 ASSESSMENT — REFRACTION_MANIFEST
OS_VA2: 20/40
OD_VA1: 20/40
OS_SPHERE: -1.50
OD_VA2: 20/40
OS_ADD: +1.50
OU_VA: 20/40
OD_AXIS: 060
OS_CYLINDER: SPH
OD_SPHERE: -1.25
OD_CYLINDER: -0.50
OD_ADD: +1.50
OS_VA1: 20/40

## 2023-01-16 ASSESSMENT — CONFRONTATIONAL VISUAL FIELD TEST (CVF)
OS_FINDINGS: FULL
OD_FINDINGS: FULL

## 2023-01-16 ASSESSMENT — VISUAL ACUITY
OS_BCVA: 20/50
OD_BCVA: 20/30-1

## 2023-01-16 ASSESSMENT — SPHEQUIV_DERIVED: OD_SPHEQUIV: -1.5

## 2023-01-19 ENCOUNTER — DOCTOR'S OFFICE (OUTPATIENT)
Dept: URBAN - NONMETROPOLITAN AREA CLINIC 1 | Facility: CLINIC | Age: 48
Setting detail: OPHTHALMOLOGY
End: 2023-01-19
Payer: COMMERCIAL

## 2023-01-19 DIAGNOSIS — H35.81: ICD-10-CM

## 2023-01-19 DIAGNOSIS — E11.3392: ICD-10-CM

## 2023-01-19 DIAGNOSIS — E11.3411: ICD-10-CM

## 2023-01-19 PROCEDURE — 92235 FLUORESCEIN ANGRPH MLTIFRAME: CPT | Performed by: OPHTHALMOLOGY

## 2023-01-19 PROCEDURE — 99213 OFFICE O/P EST LOW 20 MIN: CPT | Performed by: OPHTHALMOLOGY

## 2023-01-19 PROCEDURE — 92250 FUNDUS PHOTOGRAPHY W/I&R: CPT | Performed by: OPHTHALMOLOGY

## 2023-01-19 ASSESSMENT — REFRACTION_MANIFEST
OD_AXIS: 060
OS_ADD: +1.50
OS_VA1: 20/40
OD_SPHERE: -1.25
OS_VA2: 20/40
OU_VA: 20/40
OD_ADD: +1.50
OS_SPHERE: -1.50
OD_VA2: 20/40
OD_VA1: 20/40
OS_CYLINDER: SPH
OD_CYLINDER: -0.50

## 2023-01-19 ASSESSMENT — VISUAL ACUITY
OD_BCVA: 20/30-1
OS_BCVA: 20/50

## 2023-01-19 ASSESSMENT — CONFRONTATIONAL VISUAL FIELD TEST (CVF)
OD_FINDINGS: FULL
OS_FINDINGS: FULL

## 2023-01-19 ASSESSMENT — SPHEQUIV_DERIVED: OD_SPHEQUIV: -1.5

## 2023-01-21 ENCOUNTER — DOCTOR'S OFFICE (OUTPATIENT)
Dept: URBAN - NONMETROPOLITAN AREA CLINIC 1 | Facility: CLINIC | Age: 48
Setting detail: OPHTHALMOLOGY
End: 2023-01-21
Payer: COMMERCIAL

## 2023-01-21 ENCOUNTER — RX ONLY (RX ONLY)
Age: 48
End: 2023-01-21

## 2023-01-21 DIAGNOSIS — E11.3411: ICD-10-CM

## 2023-01-21 PROCEDURE — 67028 INJECTION EYE DRUG: CPT | Performed by: OPHTHALMOLOGY

## 2023-01-21 ASSESSMENT — VISUAL ACUITY
OS_BCVA: 20/50-2
OD_BCVA: 20/30-1

## 2023-02-02 ENCOUNTER — RX ONLY (RX ONLY)
Age: 48
End: 2023-02-02

## 2023-02-02 ENCOUNTER — DOCTOR'S OFFICE (OUTPATIENT)
Dept: URBAN - NONMETROPOLITAN AREA CLINIC 1 | Facility: CLINIC | Age: 48
Setting detail: OPHTHALMOLOGY
End: 2023-02-02
Payer: COMMERCIAL

## 2023-02-02 DIAGNOSIS — E11.3392: ICD-10-CM

## 2023-02-02 DIAGNOSIS — E11.3311: ICD-10-CM

## 2023-02-02 DIAGNOSIS — H35.81: ICD-10-CM

## 2023-02-02 PROCEDURE — 92134 CPTRZ OPH DX IMG PST SGM RTA: CPT | Performed by: OPHTHALMOLOGY

## 2023-02-02 PROCEDURE — 99214 OFFICE O/P EST MOD 30 MIN: CPT | Performed by: OPHTHALMOLOGY

## 2023-02-02 ASSESSMENT — CONFRONTATIONAL VISUAL FIELD TEST (CVF)
OS_FINDINGS: FULL
OD_FINDINGS: CONSTRICTION

## 2023-02-02 ASSESSMENT — VISUAL ACUITY
OS_BCVA: 20/100-1
OD_BCVA: 20/40-2

## 2023-02-03 ENCOUNTER — DOCTOR'S OFFICE (OUTPATIENT)
Dept: URBAN - NONMETROPOLITAN AREA CLINIC 1 | Facility: CLINIC | Age: 48
Setting detail: OPHTHALMOLOGY
End: 2023-02-03
Payer: COMMERCIAL

## 2023-02-03 DIAGNOSIS — H25.043: ICD-10-CM

## 2023-02-03 DIAGNOSIS — E11.3413: ICD-10-CM

## 2023-02-03 PROCEDURE — 92014 COMPRE OPH EXAM EST PT 1/>: CPT | Performed by: OPHTHALMOLOGY

## 2023-02-03 ASSESSMENT — CONFRONTATIONAL VISUAL FIELD TEST (CVF)
OS_FINDINGS: FULL
OD_FINDINGS: CONSTRICTION

## 2023-02-03 ASSESSMENT — DRY EYES - PHYSICIAN NOTES
OD_GENERALCOMMENTS: 2+ PEE
OS_GENERALCOMMENTS: 2+ PEE

## 2023-02-18 ENCOUNTER — DOCTOR'S OFFICE (OUTPATIENT)
Dept: URBAN - NONMETROPOLITAN AREA CLINIC 1 | Facility: CLINIC | Age: 48
Setting detail: OPHTHALMOLOGY
End: 2023-02-18
Payer: COMMERCIAL

## 2023-02-18 DIAGNOSIS — E11.3411: ICD-10-CM

## 2023-02-18 PROCEDURE — 67028 INJECTION EYE DRUG: CPT | Performed by: OPHTHALMOLOGY

## 2023-02-18 ASSESSMENT — KERATOMETRY
OS_K1POWER_DIOPTERS: 42.50
OD_K1POWER_DIOPTERS: 43.00
OS_K2POWER_DIOPTERS: 43.25
OS_AXISANGLE_DEGREES: 001
OD_K2POWER_DIOPTERS: 43.25
OD_AXISANGLE_DEGREES: 121

## 2023-02-18 ASSESSMENT — VISUAL ACUITY
OD_BCVA: 20/40
OS_BCVA: 20/40

## 2023-02-20 ENCOUNTER — AMBUL SURGICAL CARE (OUTPATIENT)
Dept: URBAN - NONMETROPOLITAN AREA SURGERY 1 | Facility: SURGERY | Age: 48
Setting detail: OPHTHALMOLOGY
End: 2023-02-20
Payer: COMMERCIAL

## 2023-02-20 DIAGNOSIS — E11.3511: ICD-10-CM

## 2023-02-20 PROCEDURE — G8918 PT W/O PREOP ORDER IV AB PRO: HCPCS | Performed by: OPHTHALMOLOGY

## 2023-02-20 PROCEDURE — 67210 TREATMENT OF RETINAL LESION: CPT | Performed by: OPHTHALMOLOGY

## 2023-02-20 PROCEDURE — G8907 PT DOC NO EVENTS ON DISCHARG: HCPCS | Performed by: OPHTHALMOLOGY

## 2023-03-01 ENCOUNTER — DOCTOR'S OFFICE (OUTPATIENT)
Dept: URBAN - NONMETROPOLITAN AREA CLINIC 1 | Facility: CLINIC | Age: 48
Setting detail: OPHTHALMOLOGY
End: 2023-03-01
Payer: COMMERCIAL

## 2023-03-01 DIAGNOSIS — H25.041: ICD-10-CM

## 2023-03-01 PROCEDURE — 92136 OPHTHALMIC BIOMETRY: CPT | Performed by: OPHTHALMOLOGY

## 2023-03-06 PROBLEM — E11.3411 DM TYPE 2; RIGHT SEVERE WITH ME, LEFT SEVERE WITH ME: Status: ACTIVE | Noted: 2023-02-03

## 2023-03-06 PROBLEM — E11.3412 DM TYPE 2; RIGHT SEVERE WITH ME, LEFT SEVERE WITH ME: Status: ACTIVE | Noted: 2023-02-03

## 2023-03-06 PROBLEM — H04.123 DRY EYE SYNDROME LACRIMAL GLAND; BOTH EYES: Status: ACTIVE | Noted: 2023-02-03

## 2023-03-06 ASSESSMENT — REFRACTION_CURRENTRX
OD_VPRISM_DIRECTION: PROGS
OD_CYLINDER: -1.00
OD_AXIS: 072
OD_OVR_VA: 20/
OS_VPRISM_DIRECTION: PROGS
OS_AXIS: 100
OD_SPHERE: -1.50
OS_CYLINDER: -0.75
OS_ADD: +1.25
OD_ADD: +1.25
OS_SPHERE: -1.25
OS_OVR_VA: 20/

## 2023-03-06 ASSESSMENT — KERATOMETRY
OS_K2POWER_DIOPTERS: 43.25
OD_AXISANGLE_DEGREES: 121
OD_K2POWER_DIOPTERS: 43.25
OS_AXISANGLE_DEGREES: 001
OS_K1POWER_DIOPTERS: 42.50
OD_K1POWER_DIOPTERS: 43.00

## 2023-03-06 ASSESSMENT — VISUAL ACUITY
OD_BCVA: 20/40
OS_BCVA: 20/40-2

## 2023-03-14 ENCOUNTER — AMBUL SURGICAL CARE (OUTPATIENT)
Dept: URBAN - NONMETROPOLITAN AREA SURGERY 1 | Facility: SURGERY | Age: 48
Setting detail: OPHTHALMOLOGY
End: 2023-03-14
Payer: COMMERCIAL

## 2023-03-14 DIAGNOSIS — H25.041: ICD-10-CM

## 2023-03-14 PROCEDURE — G8907 PT DOC NO EVENTS ON DISCHARG: HCPCS | Performed by: OPHTHALMOLOGY

## 2023-03-14 PROCEDURE — G8918 PT W/O PREOP ORDER IV AB PRO: HCPCS | Performed by: OPHTHALMOLOGY

## 2023-03-14 PROCEDURE — 66982 XCAPSL CTRC RMVL CPLX WO ECP: CPT | Performed by: OPHTHALMOLOGY

## 2023-03-16 ENCOUNTER — RX ONLY (RX ONLY)
Age: 48
End: 2023-03-16

## 2023-03-16 ENCOUNTER — DOCTOR'S OFFICE (OUTPATIENT)
Dept: URBAN - NONMETROPOLITAN AREA CLINIC 1 | Facility: CLINIC | Age: 48
Setting detail: OPHTHALMOLOGY
End: 2023-03-16
Payer: COMMERCIAL

## 2023-03-16 DIAGNOSIS — E11.3413: ICD-10-CM

## 2023-03-16 PROCEDURE — 92134 CPTRZ OPH DX IMG PST SGM RTA: CPT | Performed by: OPHTHALMOLOGY

## 2023-03-16 PROCEDURE — 99213 OFFICE O/P EST LOW 20 MIN: CPT | Performed by: OPHTHALMOLOGY

## 2023-03-16 ASSESSMENT — KERATOMETRY
OS_K2POWER_DIOPTERS: 43.25
OS_K1POWER_DIOPTERS: 42.50
OD_K1POWER_DIOPTERS: 43.00
OD_K2POWER_DIOPTERS: 43.25
OS_AXISANGLE_DEGREES: 001
OD_AXISANGLE_DEGREES: 121

## 2023-03-16 ASSESSMENT — VISUAL ACUITY
OD_BCVA: 20/40+2
OS_BCVA: 20/40

## 2023-03-16 ASSESSMENT — DRY EYES - PHYSICIAN NOTES
OD_GENERALCOMMENTS: 2+ PEE
OS_GENERALCOMMENTS: 2+ PEE

## 2023-03-21 ENCOUNTER — DOCTOR'S OFFICE (OUTPATIENT)
Dept: URBAN - NONMETROPOLITAN AREA CLINIC 1 | Facility: CLINIC | Age: 48
Setting detail: OPHTHALMOLOGY
End: 2023-03-21
Payer: COMMERCIAL

## 2023-03-21 DIAGNOSIS — H25.042: ICD-10-CM

## 2023-03-21 DIAGNOSIS — Z96.1: ICD-10-CM

## 2023-03-21 PROCEDURE — 92136 OPHTHALMIC BIOMETRY: CPT | Performed by: OPHTHALMOLOGY

## 2023-03-21 PROCEDURE — 99024 POSTOP FOLLOW-UP VISIT: CPT | Performed by: OPHTHALMOLOGY

## 2023-03-21 ASSESSMENT — KERATOMETRY
OD_AXISANGLE_DEGREES: 107
OS_AXISANGLE_DEGREES: 006
OS_K2POWER_DIOPTERS: 42.75
OD_K1POWER_DIOPTERS: 42.25
OS_K1POWER_DIOPTERS: 42.50
OD_K2POWER_DIOPTERS: 43.00

## 2023-03-21 ASSESSMENT — REFRACTION_AUTOREFRACTION
OD_AXIS: 050
OD_SPHERE: +1.00
OD_CYLINDER: -0.50

## 2023-03-21 ASSESSMENT — VISUAL ACUITY
OS_BCVA: 20/30-2
OD_BCVA: 20/30-1

## 2023-03-21 ASSESSMENT — SPHEQUIV_DERIVED: OD_SPHEQUIV: 0.75

## 2023-03-21 ASSESSMENT — CONFRONTATIONAL VISUAL FIELD TEST (CVF)
OS_FINDINGS: FULL
OD_FINDINGS: CONSTRICTION

## 2023-03-21 ASSESSMENT — AXIALLENGTH_DERIVED: OD_AL: 23.6211

## 2023-03-23 ENCOUNTER — DOCTOR'S OFFICE (OUTPATIENT)
Dept: URBAN - NONMETROPOLITAN AREA CLINIC 1 | Facility: CLINIC | Age: 48
Setting detail: OPHTHALMOLOGY
End: 2023-03-23
Payer: COMMERCIAL

## 2023-03-23 DIAGNOSIS — E11.3411: ICD-10-CM

## 2023-03-23 DIAGNOSIS — Z96.1: ICD-10-CM

## 2023-03-23 PROBLEM — H25.042: Status: ACTIVE | Noted: 2023-03-16

## 2023-03-23 PROCEDURE — 67028 INJECTION EYE DRUG: CPT | Performed by: OPHTHALMOLOGY

## 2023-03-23 PROCEDURE — 99024 POSTOP FOLLOW-UP VISIT: CPT | Performed by: OPHTHALMOLOGY

## 2023-03-23 ASSESSMENT — KERATOMETRY
OS_K2POWER_DIOPTERS: 42.75
OD_K1POWER_DIOPTERS: 42.25
OS_K1POWER_DIOPTERS: 42.50
OD_K2POWER_DIOPTERS: 43.00
OD_AXISANGLE_DEGREES: 107
OS_AXISANGLE_DEGREES: 006

## 2023-03-23 ASSESSMENT — VISUAL ACUITY
OD_BCVA: 20/30-1
OS_BCVA: 20/50+2

## 2023-03-23 ASSESSMENT — SPHEQUIV_DERIVED: OD_SPHEQUIV: 0.75

## 2023-03-23 ASSESSMENT — REFRACTION_AUTOREFRACTION
OD_CYLINDER: -0.50
OD_SPHERE: +1.00
OD_AXIS: 050

## 2023-03-23 ASSESSMENT — AXIALLENGTH_DERIVED: OD_AL: 23.6211

## 2023-04-10 ENCOUNTER — DOCTOR'S OFFICE (OUTPATIENT)
Dept: URBAN - NONMETROPOLITAN AREA CLINIC 1 | Facility: CLINIC | Age: 48
Setting detail: OPHTHALMOLOGY
End: 2023-04-10
Payer: OTHER MISCELLANEOUS

## 2023-04-10 DIAGNOSIS — Z02.71: ICD-10-CM

## 2023-04-10 PROCEDURE — MEDICAL RE MEDICAL RECORDS: Performed by: OPHTHALMOLOGY

## 2023-04-11 ENCOUNTER — AMBUL SURGICAL CARE (OUTPATIENT)
Dept: URBAN - NONMETROPOLITAN AREA SURGERY 1 | Facility: SURGERY | Age: 48
Setting detail: OPHTHALMOLOGY
End: 2023-04-11
Payer: COMMERCIAL

## 2023-04-11 DIAGNOSIS — H25.012: ICD-10-CM

## 2023-04-11 DIAGNOSIS — H25.042: ICD-10-CM

## 2023-04-11 PROCEDURE — G8918 PT W/O PREOP ORDER IV AB PRO: HCPCS | Performed by: OPHTHALMOLOGY

## 2023-04-11 PROCEDURE — G8907 PT DOC NO EVENTS ON DISCHARG: HCPCS | Performed by: OPHTHALMOLOGY

## 2023-04-11 PROCEDURE — 66982 XCAPSL CTRC RMVL CPLX WO ECP: CPT | Performed by: OPHTHALMOLOGY

## 2023-04-11 PROCEDURE — 00000: CPT | Performed by: OPHTHALMOLOGY

## 2023-04-12 ENCOUNTER — DOCTOR'S OFFICE (OUTPATIENT)
Dept: URBAN - NONMETROPOLITAN AREA CLINIC 1 | Facility: CLINIC | Age: 48
Setting detail: OPHTHALMOLOGY
End: 2023-04-12
Payer: COMMERCIAL

## 2023-04-12 DIAGNOSIS — Z96.1: ICD-10-CM

## 2023-04-12 PROCEDURE — 99024 POSTOP FOLLOW-UP VISIT: CPT | Performed by: OPHTHALMOLOGY

## 2023-04-12 ASSESSMENT — REFRACTION_AUTOREFRACTION
OD_SPHERE: +1.00
OD_CYLINDER: -0.50
OD_AXIS: 050

## 2023-04-12 ASSESSMENT — KERATOMETRY
OS_AXISANGLE_DEGREES: 006
OD_AXISANGLE_DEGREES: 107
OS_K2POWER_DIOPTERS: 42.75
OS_K1POWER_DIOPTERS: 42.50
OD_K2POWER_DIOPTERS: 43.00
OD_K1POWER_DIOPTERS: 42.25

## 2023-04-12 ASSESSMENT — SPHEQUIV_DERIVED: OD_SPHEQUIV: 0.75

## 2023-04-12 ASSESSMENT — VISUAL ACUITY
OD_BCVA: 20/25-2
OS_BCVA: 20/50+2

## 2023-04-12 ASSESSMENT — AXIALLENGTH_DERIVED: OD_AL: 23.6211

## 2023-04-13 ENCOUNTER — DOCTOR'S OFFICE (OUTPATIENT)
Dept: URBAN - NONMETROPOLITAN AREA CLINIC 1 | Facility: CLINIC | Age: 48
Setting detail: OPHTHALMOLOGY
End: 2023-04-13
Payer: COMMERCIAL

## 2023-04-13 DIAGNOSIS — E11.3411: ICD-10-CM

## 2023-04-13 DIAGNOSIS — E11.3412: ICD-10-CM

## 2023-04-13 DIAGNOSIS — H04.123: ICD-10-CM

## 2023-04-13 PROCEDURE — 99214 OFFICE O/P EST MOD 30 MIN: CPT | Performed by: OPHTHALMOLOGY

## 2023-04-13 PROCEDURE — 92134 CPTRZ OPH DX IMG PST SGM RTA: CPT | Performed by: OPHTHALMOLOGY

## 2023-04-13 ASSESSMENT — KERATOMETRY
OD_K2POWER_DIOPTERS: 43.00
OD_AXISANGLE_DEGREES: 107
OS_K2POWER_DIOPTERS: 42.75
OS_K1POWER_DIOPTERS: 42.50
OD_K1POWER_DIOPTERS: 42.25
OS_AXISANGLE_DEGREES: 006

## 2023-04-13 ASSESSMENT — SPHEQUIV_DERIVED: OD_SPHEQUIV: 0.75

## 2023-04-13 ASSESSMENT — CONFRONTATIONAL VISUAL FIELD TEST (CVF)
OD_FINDINGS: FULL
OS_FINDINGS: FULL

## 2023-04-13 ASSESSMENT — AXIALLENGTH_DERIVED: OD_AL: 23.6211

## 2023-04-13 ASSESSMENT — REFRACTION_AUTOREFRACTION
OD_AXIS: 050
OD_SPHERE: +1.00
OD_CYLINDER: -0.50

## 2023-04-13 ASSESSMENT — VISUAL ACUITY
OD_BCVA: 20/30+2
OS_BCVA: 20/30+2

## 2023-04-21 ENCOUNTER — DOCTOR'S OFFICE (OUTPATIENT)
Dept: URBAN - NONMETROPOLITAN AREA CLINIC 1 | Facility: CLINIC | Age: 48
Setting detail: OPHTHALMOLOGY
End: 2023-04-21
Payer: COMMERCIAL

## 2023-04-21 DIAGNOSIS — E11.3411: ICD-10-CM

## 2023-04-21 PROCEDURE — 67028 INJECTION EYE DRUG: CPT | Performed by: OPHTHALMOLOGY

## 2023-04-21 ASSESSMENT — VISUAL ACUITY
OS_BCVA: 20/25
OD_BCVA: 20/30+2

## 2023-04-21 ASSESSMENT — KERATOMETRY
OS_K2POWER_DIOPTERS: 42.75
OD_AXISANGLE_DEGREES: 107
OS_K1POWER_DIOPTERS: 42.50
OS_AXISANGLE_DEGREES: 006
OD_K1POWER_DIOPTERS: 42.25
OD_K2POWER_DIOPTERS: 43.00

## 2023-04-21 ASSESSMENT — SPHEQUIV_DERIVED: OD_SPHEQUIV: 0.75

## 2023-04-21 ASSESSMENT — REFRACTION_AUTOREFRACTION
OD_SPHERE: +1.00
OD_AXIS: 050
OD_CYLINDER: -0.50

## 2023-04-21 ASSESSMENT — AXIALLENGTH_DERIVED: OD_AL: 23.6211

## 2023-04-26 ENCOUNTER — DOCTOR'S OFFICE (OUTPATIENT)
Dept: URBAN - NONMETROPOLITAN AREA CLINIC 1 | Facility: CLINIC | Age: 48
Setting detail: OPHTHALMOLOGY
End: 2023-04-26
Payer: COMMERCIAL

## 2023-04-26 DIAGNOSIS — Z96.1: ICD-10-CM

## 2023-04-26 PROCEDURE — 99024 POSTOP FOLLOW-UP VISIT: CPT | Performed by: OPHTHALMOLOGY

## 2023-04-26 ASSESSMENT — SPHEQUIV_DERIVED
OS_SPHEQUIV: 0.75
OD_SPHEQUIV: 0.75

## 2023-04-26 ASSESSMENT — KERATOMETRY
OS_AXISANGLE_DEGREES: 097
OD_K1POWER_DIOPTERS: 41.50
OD_AXISANGLE_DEGREES: 146
OD_K2POWER_DIOPTERS: 42.00
OS_K1POWER_DIOPTERS: 44.25
OS_K2POWER_DIOPTERS: 44.75

## 2023-04-26 ASSESSMENT — AXIALLENGTH_DERIVED
OD_AL: 23.9478
OS_AL: 22.9503

## 2023-04-26 ASSESSMENT — REFRACTION_AUTOREFRACTION
OS_AXIS: 086
OD_CYLINDER: 0.00
OD_AXIS: 180
OS_SPHERE: +1.00
OS_CYLINDER: -0.50
OD_SPHERE: +0.75

## 2023-04-26 ASSESSMENT — CONFRONTATIONAL VISUAL FIELD TEST (CVF)
OS_FINDINGS: FULL
OD_FINDINGS: FULL

## 2023-04-26 ASSESSMENT — VISUAL ACUITY
OD_BCVA: 20/30
OS_BCVA: 20/25

## 2023-04-27 ENCOUNTER — DOCTOR'S OFFICE (OUTPATIENT)
Dept: URBAN - NONMETROPOLITAN AREA CLINIC 1 | Facility: CLINIC | Age: 48
Setting detail: OPHTHALMOLOGY
End: 2023-04-27
Payer: COMMERCIAL

## 2023-04-27 DIAGNOSIS — H04.123: ICD-10-CM

## 2023-04-27 DIAGNOSIS — E11.3411: ICD-10-CM

## 2023-04-27 DIAGNOSIS — E11.3412: ICD-10-CM

## 2023-04-27 PROCEDURE — 99213 OFFICE O/P EST LOW 20 MIN: CPT | Performed by: OPHTHALMOLOGY

## 2023-04-27 PROCEDURE — 92235 FLUORESCEIN ANGRPH MLTIFRAME: CPT | Performed by: OPHTHALMOLOGY

## 2023-04-27 PROCEDURE — 92250 FUNDUS PHOTOGRAPHY W/I&R: CPT | Performed by: OPHTHALMOLOGY

## 2023-04-27 ASSESSMENT — CONFRONTATIONAL VISUAL FIELD TEST (CVF)
OD_FINDINGS: FULL
OS_FINDINGS: FULL

## 2023-04-28 ASSESSMENT — KERATOMETRY
OD_K2POWER_DIOPTERS: 42.00
OS_K2POWER_DIOPTERS: 44.75
OD_K1POWER_DIOPTERS: 41.50
OS_K1POWER_DIOPTERS: 44.25
OD_AXISANGLE_DEGREES: 146
OS_AXISANGLE_DEGREES: 097

## 2023-04-28 ASSESSMENT — VISUAL ACUITY
OD_BCVA: 20/50
OS_BCVA: 20/25-2

## 2023-04-28 ASSESSMENT — REFRACTION_AUTOREFRACTION
OD_SPHERE: +0.75
OD_AXIS: 180
OS_AXIS: 086
OS_SPHERE: +1.00
OD_CYLINDER: 0.00
OS_CYLINDER: -0.50

## 2023-04-28 ASSESSMENT — SPHEQUIV_DERIVED
OS_SPHEQUIV: 0.75
OD_SPHEQUIV: 0.75

## 2023-04-28 ASSESSMENT — AXIALLENGTH_DERIVED
OS_AL: 22.9503
OD_AL: 23.9478

## 2023-05-02 ENCOUNTER — DOCTOR'S OFFICE (OUTPATIENT)
Dept: URBAN - NONMETROPOLITAN AREA CLINIC 1 | Facility: CLINIC | Age: 48
Setting detail: OPHTHALMOLOGY
End: 2023-05-02
Payer: COMMERCIAL

## 2023-05-02 DIAGNOSIS — Z96.1: ICD-10-CM

## 2023-05-02 PROCEDURE — 99024 POSTOP FOLLOW-UP VISIT: CPT | Performed by: OPHTHALMOLOGY

## 2023-05-02 ASSESSMENT — AXIALLENGTH_DERIVED
OD_AL: 23.6211
OS_AL: 23.21

## 2023-05-02 ASSESSMENT — REFRACTION_AUTOREFRACTION
OS_SPHERE: +1.00
OD_CYLINDER: -0.50
OD_AXIS: 101
OS_CYLINDER: -0.50
OD_SPHERE: +1.00
OS_AXIS: 086

## 2023-05-02 ASSESSMENT — CONFRONTATIONAL VISUAL FIELD TEST (CVF)
OD_FINDINGS: FULL
OS_FINDINGS: FULL

## 2023-05-02 ASSESSMENT — KERATOMETRY
OS_AXISANGLE_DEGREES: 061
OD_K2POWER_DIOPTERS: 42.75
OS_K1POWER_DIOPTERS: 44.51
OS_K2POWER_DIOPTERS: 43.00
OD_AXISANGLE_DEGREES: 146
OD_K1POWER_DIOPTERS: 42.50

## 2023-05-02 ASSESSMENT — SPHEQUIV_DERIVED
OS_SPHEQUIV: 0.75
OD_SPHEQUIV: 0.75

## 2023-05-02 ASSESSMENT — VISUAL ACUITY
OD_BCVA: 20/20-1
OS_BCVA: 20/25-2

## 2023-05-25 ENCOUNTER — DOCTOR'S OFFICE (OUTPATIENT)
Dept: URBAN - NONMETROPOLITAN AREA CLINIC 1 | Facility: CLINIC | Age: 48
Setting detail: OPHTHALMOLOGY
End: 2023-05-25
Payer: COMMERCIAL

## 2023-05-25 DIAGNOSIS — Z96.1: ICD-10-CM

## 2023-05-25 DIAGNOSIS — E11.3411: ICD-10-CM

## 2023-05-25 PROCEDURE — 67028 INJECTION EYE DRUG: CPT | Performed by: OPHTHALMOLOGY

## 2023-05-25 PROCEDURE — 99024 POSTOP FOLLOW-UP VISIT: CPT | Performed by: OPHTHALMOLOGY

## 2023-05-25 ASSESSMENT — REFRACTION_AUTOREFRACTION
OS_AXIS: 086
OD_CYLINDER: -0.50
OS_CYLINDER: -0.50
OS_SPHERE: +1.00
OD_SPHERE: +1.00
OD_AXIS: 101

## 2023-05-25 ASSESSMENT — KERATOMETRY
OD_K2POWER_DIOPTERS: 42.75
OS_K1POWER_DIOPTERS: 44.51
OD_AXISANGLE_DEGREES: 146
OS_K2POWER_DIOPTERS: 43.00
OD_K1POWER_DIOPTERS: 42.50
OS_AXISANGLE_DEGREES: 061

## 2023-05-25 ASSESSMENT — SPHEQUIV_DERIVED
OS_SPHEQUIV: 0.75
OD_SPHEQUIV: 0.75

## 2023-05-25 ASSESSMENT — AXIALLENGTH_DERIVED
OD_AL: 23.6211
OS_AL: 23.21

## 2023-05-25 ASSESSMENT — VISUAL ACUITY
OD_BCVA: 20/20-1
OS_BCVA: 20/25

## 2023-06-01 ENCOUNTER — DOCTOR'S OFFICE (OUTPATIENT)
Dept: URBAN - NONMETROPOLITAN AREA CLINIC 1 | Facility: CLINIC | Age: 48
Setting detail: OPHTHALMOLOGY
End: 2023-06-01
Payer: COMMERCIAL

## 2023-06-01 DIAGNOSIS — H35.81: ICD-10-CM

## 2023-06-01 DIAGNOSIS — E11.3411: ICD-10-CM

## 2023-06-01 DIAGNOSIS — H04.123: ICD-10-CM

## 2023-06-01 DIAGNOSIS — E11.3412: ICD-10-CM

## 2023-06-01 PROCEDURE — 99214 OFFICE O/P EST MOD 30 MIN: CPT | Performed by: OPHTHALMOLOGY

## 2023-06-01 PROCEDURE — 92134 CPTRZ OPH DX IMG PST SGM RTA: CPT | Performed by: OPHTHALMOLOGY

## 2023-06-01 ASSESSMENT — REFRACTION_AUTOREFRACTION
OS_AXIS: 086
OS_SPHERE: +1.00
OS_CYLINDER: -0.50
OD_SPHERE: +1.00
OD_AXIS: 101
OD_CYLINDER: -0.50

## 2023-06-01 ASSESSMENT — VISUAL ACUITY
OD_BCVA: 20/25
OS_BCVA: 20/25-1

## 2023-06-01 ASSESSMENT — AXIALLENGTH_DERIVED
OD_AL: 23.6211
OS_AL: 23.21

## 2023-06-01 ASSESSMENT — SPHEQUIV_DERIVED
OS_SPHEQUIV: 0.75
OD_SPHEQUIV: 0.75

## 2023-06-01 ASSESSMENT — KERATOMETRY
OD_K1POWER_DIOPTERS: 42.50
OS_K2POWER_DIOPTERS: 43.00
OS_AXISANGLE_DEGREES: 061
OS_K1POWER_DIOPTERS: 44.51
OD_K2POWER_DIOPTERS: 42.75
OD_AXISANGLE_DEGREES: 146

## 2023-06-01 ASSESSMENT — CONFRONTATIONAL VISUAL FIELD TEST (CVF)
OS_FINDINGS: FULL
OD_FINDINGS: FULL

## 2023-06-06 ENCOUNTER — DOCTOR'S OFFICE (OUTPATIENT)
Dept: URBAN - NONMETROPOLITAN AREA CLINIC 1 | Facility: CLINIC | Age: 48
Setting detail: OPHTHALMOLOGY
End: 2023-06-06
Payer: COMMERCIAL

## 2023-06-06 ENCOUNTER — OPTICAL OFFICE (OUTPATIENT)
Dept: URBAN - NONMETROPOLITAN AREA CLINIC 4 | Facility: CLINIC | Age: 48
Setting detail: OPHTHALMOLOGY
End: 2023-06-06
Payer: COMMERCIAL

## 2023-06-06 DIAGNOSIS — Z96.1: ICD-10-CM

## 2023-06-06 DIAGNOSIS — H52.4: ICD-10-CM

## 2023-06-06 PROBLEM — H35.81 DIABETIC MACULAR EDEMA: Status: ACTIVE | Noted: 2023-06-01

## 2023-06-06 PROCEDURE — V2203 LENS SPHCYL BIFOCAL 4.00D/.1: HCPCS | Performed by: OPTOMETRIST

## 2023-06-06 PROCEDURE — V2020 VISION SVCS FRAMES PURCHASES: HCPCS | Performed by: OPTOMETRIST

## 2023-06-06 PROCEDURE — 92012 INTRM OPH EXAM EST PATIENT: CPT | Performed by: OPTOMETRIST

## 2023-06-06 ASSESSMENT — SPHEQUIV_DERIVED
OS_SPHEQUIV: 0.5
OD_SPHEQUIV: 0.75
OD_SPHEQUIV: 0.25
OS_SPHEQUIV: 0

## 2023-06-06 ASSESSMENT — REFRACTION_MANIFEST
OS_ADD: +2.50
OS_VA2: 20/25+2
OS_VA1: 20/25+2
OS_SPHERE: +0.25
OD_VA1: 20/25
OS_CYLINDER: -0.50
OD_CYLINDER: -0.50
OD_ADD: +2.50
OD_SPHERE: +0.50
OS_AXIS: 110
OD_AXIS: 055
OD_VA2: 20/25

## 2023-06-06 ASSESSMENT — KERATOMETRY
OD_K1POWER_DIOPTERS: 42.50
OS_AXISANGLE_DEGREES: 061
OD_AXISANGLE_DEGREES: 146
OD_K2POWER_DIOPTERS: 42.75
OS_K2POWER_DIOPTERS: 43.00
OS_K1POWER_DIOPTERS: 44.51

## 2023-06-06 ASSESSMENT — AXIALLENGTH_DERIVED
OS_AL: 23.5
OD_AL: 23.6211
OS_AL: 23.31
OD_AL: 23.818

## 2023-06-06 ASSESSMENT — REFRACTION_AUTOREFRACTION
OS_SPHERE: +0.75
OD_SPHERE: +1.00
OD_CYLINDER: -0.50
OS_AXIS: 112
OD_AXIS: 053
OS_CYLINDER: -0.50

## 2023-06-06 ASSESSMENT — CONFRONTATIONAL VISUAL FIELD TEST (CVF)
OS_FINDINGS: FULL
OD_FINDINGS: FULL

## 2023-06-06 ASSESSMENT — VISUAL ACUITY
OD_BCVA: 20/25-1
OS_BCVA: 20/30+1

## 2023-08-03 ENCOUNTER — DOCTOR'S OFFICE (OUTPATIENT)
Dept: URBAN - NONMETROPOLITAN AREA CLINIC 1 | Facility: CLINIC | Age: 48
Setting detail: OPHTHALMOLOGY
End: 2023-08-03
Payer: COMMERCIAL

## 2023-08-03 DIAGNOSIS — E11.3411: ICD-10-CM

## 2023-08-03 PROCEDURE — 67028 INJECTION EYE DRUG: CPT | Performed by: OPHTHALMOLOGY

## 2023-08-03 ASSESSMENT — REFRACTION_AUTOREFRACTION
OS_SPHERE: +0.75
OD_CYLINDER: -0.50
OS_AXIS: 112
OD_AXIS: 053
OD_SPHERE: +1.00
OS_CYLINDER: -0.50

## 2023-08-03 ASSESSMENT — AXIALLENGTH_DERIVED
OD_AL: 23.6211
OS_AL: 23.31

## 2023-08-03 ASSESSMENT — KERATOMETRY
OS_K1POWER_DIOPTERS: 44.51
OD_AXISANGLE_DEGREES: 146
OD_K1POWER_DIOPTERS: 42.50
OD_K2POWER_DIOPTERS: 42.75
OS_K2POWER_DIOPTERS: 43.00
OS_AXISANGLE_DEGREES: 061

## 2023-08-03 ASSESSMENT — SPHEQUIV_DERIVED
OD_SPHEQUIV: 0.75
OS_SPHEQUIV: 0.5

## 2023-08-03 ASSESSMENT — VISUAL ACUITY
OD_BCVA: 20/25-1
OS_BCVA: 20/30-1

## 2023-08-17 ENCOUNTER — RX ONLY (RX ONLY)
Age: 48
End: 2023-08-17

## 2023-08-17 ENCOUNTER — DOCTOR'S OFFICE (OUTPATIENT)
Dept: URBAN - NONMETROPOLITAN AREA CLINIC 1 | Facility: CLINIC | Age: 48
Setting detail: OPHTHALMOLOGY
End: 2023-08-17
Payer: COMMERCIAL

## 2023-08-17 DIAGNOSIS — H04.123: ICD-10-CM

## 2023-08-17 DIAGNOSIS — E11.3413: ICD-10-CM

## 2023-08-17 PROCEDURE — 92134 CPTRZ OPH DX IMG PST SGM RTA: CPT | Performed by: OPHTHALMOLOGY

## 2023-08-17 PROCEDURE — 99214 OFFICE O/P EST MOD 30 MIN: CPT | Performed by: OPHTHALMOLOGY

## 2023-08-17 ASSESSMENT — KERATOMETRY
OD_K1POWER_DIOPTERS: 42.50
OD_K2POWER_DIOPTERS: 42.75
OS_K2POWER_DIOPTERS: 43.00
OD_AXISANGLE_DEGREES: 146
OS_K1POWER_DIOPTERS: 44.51
OS_AXISANGLE_DEGREES: 061

## 2023-08-17 ASSESSMENT — CONFRONTATIONAL VISUAL FIELD TEST (CVF)
OS_FINDINGS: FULL
OD_FINDINGS: FULL

## 2023-08-17 ASSESSMENT — SPHEQUIV_DERIVED
OD_SPHEQUIV: 0.75
OS_SPHEQUIV: 0.5

## 2023-08-17 ASSESSMENT — AXIALLENGTH_DERIVED
OS_AL: 23.31
OD_AL: 23.6211

## 2023-08-17 ASSESSMENT — REFRACTION_AUTOREFRACTION
OS_CYLINDER: -0.50
OD_CYLINDER: -0.50
OS_SPHERE: +0.75
OD_AXIS: 053
OD_SPHERE: +1.00
OS_AXIS: 112

## 2023-08-17 ASSESSMENT — VISUAL ACUITY
OD_BCVA: 20/25+1
OS_BCVA: 20/25+2

## 2023-08-21 ENCOUNTER — AMBUL SURGICAL CARE (OUTPATIENT)
Dept: URBAN - NONMETROPOLITAN AREA SURGERY 1 | Facility: SURGERY | Age: 48
Setting detail: OPHTHALMOLOGY
End: 2023-08-21
Payer: COMMERCIAL

## 2023-08-21 DIAGNOSIS — E11.3512: ICD-10-CM

## 2023-08-21 PROCEDURE — G8918 PT W/O PREOP ORDER IV AB PRO: HCPCS | Performed by: OPHTHALMOLOGY

## 2023-08-21 PROCEDURE — G8907 PT DOC NO EVENTS ON DISCHARG: HCPCS | Performed by: OPHTHALMOLOGY

## 2023-08-21 PROCEDURE — 67210 TREATMENT OF RETINAL LESION: CPT | Performed by: OPHTHALMOLOGY

## 2023-09-14 ENCOUNTER — DOCTOR'S OFFICE (OUTPATIENT)
Dept: URBAN - NONMETROPOLITAN AREA CLINIC 1 | Facility: CLINIC | Age: 48
Setting detail: OPHTHALMOLOGY
End: 2023-09-14
Payer: COMMERCIAL

## 2023-09-14 DIAGNOSIS — E11.3412: ICD-10-CM

## 2023-09-14 DIAGNOSIS — E11.3411: ICD-10-CM

## 2023-09-14 PROCEDURE — 99024 POSTOP FOLLOW-UP VISIT: CPT | Performed by: OPHTHALMOLOGY

## 2023-09-14 PROCEDURE — 67028 INJECTION EYE DRUG: CPT | Performed by: OPHTHALMOLOGY

## 2023-09-15 ASSESSMENT — AXIALLENGTH_DERIVED
OD_AL: 23.6211
OS_AL: 23.31

## 2023-09-15 ASSESSMENT — KERATOMETRY
OD_AXISANGLE_DEGREES: 146
OD_K2POWER_DIOPTERS: 42.75
OD_K1POWER_DIOPTERS: 42.50
OS_AXISANGLE_DEGREES: 061
OS_K2POWER_DIOPTERS: 43.00
OS_K1POWER_DIOPTERS: 44.51

## 2023-09-15 ASSESSMENT — SPHEQUIV_DERIVED
OS_SPHEQUIV: 0.5
OD_SPHEQUIV: 0.75

## 2023-09-15 ASSESSMENT — REFRACTION_AUTOREFRACTION
OS_CYLINDER: -0.50
OD_SPHERE: +1.00
OD_AXIS: 053
OD_CYLINDER: -0.50
OS_SPHERE: +0.75
OS_AXIS: 112

## 2023-09-15 ASSESSMENT — VISUAL ACUITY
OS_BCVA: 20/25+2
OD_BCVA: 20/25-2

## 2023-09-21 ENCOUNTER — DOCTOR'S OFFICE (OUTPATIENT)
Dept: URBAN - NONMETROPOLITAN AREA CLINIC 1 | Facility: CLINIC | Age: 48
Setting detail: OPHTHALMOLOGY
End: 2023-09-21
Payer: COMMERCIAL

## 2023-09-21 DIAGNOSIS — E11.3413: ICD-10-CM

## 2023-09-21 DIAGNOSIS — H04.123: ICD-10-CM

## 2023-09-21 PROCEDURE — 92134 CPTRZ OPH DX IMG PST SGM RTA: CPT | Performed by: OPHTHALMOLOGY

## 2023-09-21 PROCEDURE — 99024 POSTOP FOLLOW-UP VISIT: CPT | Performed by: OPHTHALMOLOGY

## 2023-09-21 ASSESSMENT — KERATOMETRY
OD_K1POWER_DIOPTERS: 42.50
OS_K2POWER_DIOPTERS: 43.00
OD_K2POWER_DIOPTERS: 42.75
OS_K1POWER_DIOPTERS: 44.51
OS_AXISANGLE_DEGREES: 061
OD_AXISANGLE_DEGREES: 146

## 2023-09-21 ASSESSMENT — AXIALLENGTH_DERIVED
OS_AL: 23.31
OD_AL: 23.6211

## 2023-09-21 ASSESSMENT — REFRACTION_AUTOREFRACTION
OS_AXIS: 112
OS_CYLINDER: -0.50
OD_AXIS: 053
OS_SPHERE: +0.75
OD_SPHERE: +1.00
OD_CYLINDER: -0.50

## 2023-09-21 ASSESSMENT — SPHEQUIV_DERIVED
OS_SPHEQUIV: 0.5
OD_SPHEQUIV: 0.75

## 2023-09-21 ASSESSMENT — VISUAL ACUITY
OS_BCVA: 20/25+2
OD_BCVA: 20/25+2

## 2023-09-21 ASSESSMENT — CONFRONTATIONAL VISUAL FIELD TEST (CVF)
OS_FINDINGS: FULL
OD_FINDINGS: FULL

## 2023-10-02 ENCOUNTER — DOCTOR'S OFFICE (OUTPATIENT)
Dept: URBAN - NONMETROPOLITAN AREA CLINIC 1 | Facility: CLINIC | Age: 48
Setting detail: OPHTHALMOLOGY
End: 2023-10-02
Payer: COMMERCIAL

## 2023-10-02 DIAGNOSIS — E11.3411: ICD-10-CM

## 2023-10-02 DIAGNOSIS — E11.3412: ICD-10-CM

## 2023-10-02 PROCEDURE — 92235 FLUORESCEIN ANGRPH MLTIFRAME: CPT | Performed by: OPHTHALMOLOGY

## 2023-10-02 PROCEDURE — 67028 INJECTION EYE DRUG: CPT | Mod: 58,LT | Performed by: OPHTHALMOLOGY

## 2023-10-02 PROCEDURE — 99024 POSTOP FOLLOW-UP VISIT: CPT | Performed by: OPHTHALMOLOGY

## 2023-10-02 PROCEDURE — 92250 FUNDUS PHOTOGRAPHY W/I&R: CPT | Performed by: OPHTHALMOLOGY

## 2023-10-02 ASSESSMENT — CONFRONTATIONAL VISUAL FIELD TEST (CVF)
OD_FINDINGS: FULL
OS_FINDINGS: FULL

## 2023-10-03 ENCOUNTER — RX ONLY (RX ONLY)
Age: 48
End: 2023-10-03

## 2023-10-03 ASSESSMENT — SPHEQUIV_DERIVED
OD_SPHEQUIV: 0.75
OS_SPHEQUIV: 0.5

## 2023-10-03 ASSESSMENT — KERATOMETRY
OD_K1POWER_DIOPTERS: 42.50
OD_AXISANGLE_DEGREES: 146
OS_K2POWER_DIOPTERS: 43.00
OS_K1POWER_DIOPTERS: 44.51
OS_AXISANGLE_DEGREES: 061
OD_K2POWER_DIOPTERS: 42.75

## 2023-10-03 ASSESSMENT — REFRACTION_AUTOREFRACTION
OS_AXIS: 112
OD_CYLINDER: -0.50
OD_SPHERE: +1.00
OS_SPHERE: +0.75
OD_AXIS: 053
OS_CYLINDER: -0.50

## 2023-10-03 ASSESSMENT — AXIALLENGTH_DERIVED
OS_AL: 23.31
OD_AL: 23.6211

## 2023-10-03 ASSESSMENT — VISUAL ACUITY
OD_BCVA: 20/20-1
OS_BCVA: 20/25-2

## 2023-10-19 ENCOUNTER — DOCTOR'S OFFICE (OUTPATIENT)
Dept: URBAN - NONMETROPOLITAN AREA CLINIC 1 | Facility: CLINIC | Age: 48
Setting detail: OPHTHALMOLOGY
End: 2023-10-19
Payer: COMMERCIAL

## 2023-10-19 DIAGNOSIS — E11.3413: ICD-10-CM

## 2023-10-19 PROCEDURE — 92014 COMPRE OPH EXAM EST PT 1/>: CPT | Performed by: OPHTHALMOLOGY

## 2023-10-19 PROCEDURE — 92134 CPTRZ OPH DX IMG PST SGM RTA: CPT | Performed by: OPHTHALMOLOGY

## 2023-10-19 ASSESSMENT — KERATOMETRY
OD_AXISANGLE_DEGREES: 146
OD_K1POWER_DIOPTERS: 42.50
OS_K1POWER_DIOPTERS: 44.51
OS_AXISANGLE_DEGREES: 061
OD_K2POWER_DIOPTERS: 42.75
OS_K2POWER_DIOPTERS: 43.00

## 2023-10-19 ASSESSMENT — REFRACTION_AUTOREFRACTION
OD_SPHERE: +1.00
OD_CYLINDER: -0.50
OD_AXIS: 053
OS_AXIS: 112
OS_CYLINDER: -0.50
OS_SPHERE: +0.75

## 2023-10-19 ASSESSMENT — AXIALLENGTH_DERIVED
OS_AL: 23.31
OD_AL: 23.6211

## 2023-10-19 ASSESSMENT — VISUAL ACUITY
OD_BCVA: 20/25+2
OS_BCVA: 20/25

## 2023-10-19 ASSESSMENT — CONFRONTATIONAL VISUAL FIELD TEST (CVF)
OS_FINDINGS: FULL
OD_FINDINGS: FULL

## 2023-10-19 ASSESSMENT — SPHEQUIV_DERIVED
OD_SPHEQUIV: 0.75
OS_SPHEQUIV: 0.5

## 2023-12-02 ENCOUNTER — DOCTOR'S OFFICE (OUTPATIENT)
Dept: URBAN - NONMETROPOLITAN AREA CLINIC 1 | Facility: CLINIC | Age: 48
Setting detail: OPHTHALMOLOGY
End: 2023-12-02
Payer: COMMERCIAL

## 2023-12-02 DIAGNOSIS — E11.3412: ICD-10-CM

## 2023-12-02 DIAGNOSIS — E11.3411: ICD-10-CM

## 2023-12-02 PROCEDURE — 92134 CPTRZ OPH DX IMG PST SGM RTA: CPT | Performed by: OPHTHALMOLOGY

## 2023-12-02 PROCEDURE — 67028 INJECTION EYE DRUG: CPT | Mod: RT | Performed by: OPHTHALMOLOGY

## 2023-12-02 ASSESSMENT — CONFRONTATIONAL VISUAL FIELD TEST (CVF)
OS_FINDINGS: FULL
OD_FINDINGS: FULL

## 2023-12-04 ASSESSMENT — REFRACTION_AUTOREFRACTION
OD_SPHERE: +1.00
OS_SPHERE: +0.75
OS_AXIS: 112
OD_AXIS: 053
OS_CYLINDER: -0.50
OD_CYLINDER: -0.50

## 2023-12-04 ASSESSMENT — SPHEQUIV_DERIVED
OD_SPHEQUIV: 0.75
OS_SPHEQUIV: 0.5

## 2023-12-18 ENCOUNTER — DOCTOR'S OFFICE (OUTPATIENT)
Dept: URBAN - NONMETROPOLITAN AREA CLINIC 1 | Facility: CLINIC | Age: 48
Setting detail: OPHTHALMOLOGY
End: 2023-12-18
Payer: COMMERCIAL

## 2023-12-18 DIAGNOSIS — E11.3412: ICD-10-CM

## 2023-12-18 PROCEDURE — 67028 INJECTION EYE DRUG: CPT | Mod: LT | Performed by: OPHTHALMOLOGY

## 2023-12-18 ASSESSMENT — REFRACTION_AUTOREFRACTION
OD_SPHERE: +1.00
OD_AXIS: 053
OD_CYLINDER: -0.50
OS_CYLINDER: -0.50
OS_AXIS: 112
OS_SPHERE: +0.75

## 2023-12-18 ASSESSMENT — SPHEQUIV_DERIVED
OS_SPHEQUIV: 0.5
OD_SPHEQUIV: 0.75

## 2024-01-22 ENCOUNTER — DOCTOR'S OFFICE (OUTPATIENT)
Dept: URBAN - NONMETROPOLITAN AREA CLINIC 1 | Facility: CLINIC | Age: 49
Setting detail: OPHTHALMOLOGY
End: 2024-01-22
Payer: COMMERCIAL

## 2024-01-22 DIAGNOSIS — H04.123: ICD-10-CM

## 2024-01-22 DIAGNOSIS — E11.3413: ICD-10-CM

## 2024-01-22 PROCEDURE — 92202 OPSCPY EXTND ON/MAC DRAW: CPT | Performed by: OPHTHALMOLOGY

## 2024-01-22 PROCEDURE — 99214 OFFICE O/P EST MOD 30 MIN: CPT | Performed by: OPHTHALMOLOGY

## 2024-01-22 PROCEDURE — 92134 CPTRZ OPH DX IMG PST SGM RTA: CPT | Performed by: OPHTHALMOLOGY

## 2024-01-22 ASSESSMENT — REFRACTION_AUTOREFRACTION
OD_CYLINDER: -0.50
OS_AXIS: 112
OS_CYLINDER: -0.50
OD_SPHERE: +1.00
OD_AXIS: 053
OS_SPHERE: +0.75

## 2024-01-22 ASSESSMENT — CONFRONTATIONAL VISUAL FIELD TEST (CVF)
OD_FINDINGS: FULL
OS_FINDINGS: FULL

## 2024-01-22 ASSESSMENT — SPHEQUIV_DERIVED
OS_SPHEQUIV: 0.5
OD_SPHEQUIV: 0.75

## 2024-01-25 ENCOUNTER — AMBUL SURGICAL CARE (OUTPATIENT)
Dept: URBAN - NONMETROPOLITAN AREA SURGERY 1 | Facility: SURGERY | Age: 49
Setting detail: OPHTHALMOLOGY
End: 2024-01-25
Payer: COMMERCIAL

## 2024-01-25 DIAGNOSIS — E11.3511: ICD-10-CM

## 2024-01-25 PROCEDURE — 67228 TREATMENT X10SV RETINOPATHY: CPT | Mod: RT | Performed by: OPHTHALMOLOGY

## 2024-01-25 PROCEDURE — G8907 PT DOC NO EVENTS ON DISCHARG: HCPCS | Performed by: OPHTHALMOLOGY

## 2024-01-25 PROCEDURE — G8918 PT W/O PREOP ORDER IV AB PRO: HCPCS | Performed by: OPHTHALMOLOGY

## 2024-02-19 ENCOUNTER — DOCTOR'S OFFICE (OUTPATIENT)
Dept: URBAN - NONMETROPOLITAN AREA CLINIC 1 | Facility: CLINIC | Age: 49
Setting detail: OPHTHALMOLOGY
End: 2024-02-19
Payer: COMMERCIAL

## 2024-02-19 DIAGNOSIS — H04.123: ICD-10-CM

## 2024-02-19 DIAGNOSIS — E11.3413: ICD-10-CM

## 2024-02-19 PROCEDURE — 99214 OFFICE O/P EST MOD 30 MIN: CPT | Performed by: OPHTHALMOLOGY

## 2024-02-19 PROCEDURE — 92134 CPTRZ OPH DX IMG PST SGM RTA: CPT | Performed by: OPHTHALMOLOGY

## 2024-02-19 ASSESSMENT — CONFRONTATIONAL VISUAL FIELD TEST (CVF)
OD_FINDINGS: FULL
OS_FINDINGS: FULL

## 2024-03-25 ENCOUNTER — DOCTOR'S OFFICE (OUTPATIENT)
Dept: URBAN - NONMETROPOLITAN AREA CLINIC 1 | Facility: CLINIC | Age: 49
Setting detail: OPHTHALMOLOGY
End: 2024-03-25
Payer: COMMERCIAL

## 2024-03-25 DIAGNOSIS — E11.3413: ICD-10-CM

## 2024-03-25 DIAGNOSIS — H35.3132: ICD-10-CM

## 2024-03-25 DIAGNOSIS — H04.123: ICD-10-CM

## 2024-03-25 PROBLEM — H35.3112 MACULAR EDEMA; RIGHT EYE INTERMEDIATE, LEFT EYE INTERMEDIATE: Status: ACTIVE | Noted: 2024-03-25

## 2024-03-25 PROBLEM — H35.3122 MACULAR EDEMA; RIGHT EYE INTERMEDIATE, LEFT EYE INTERMEDIATE: Status: ACTIVE | Noted: 2024-03-25

## 2024-03-25 PROCEDURE — 92202 OPSCPY EXTND ON/MAC DRAW: CPT | Performed by: OPHTHALMOLOGY

## 2024-03-25 PROCEDURE — 99214 OFFICE O/P EST MOD 30 MIN: CPT | Performed by: OPHTHALMOLOGY

## 2024-03-25 PROCEDURE — 92134 CPTRZ OPH DX IMG PST SGM RTA: CPT | Performed by: OPHTHALMOLOGY

## 2024-04-08 ENCOUNTER — DOCTOR'S OFFICE (OUTPATIENT)
Dept: URBAN - NONMETROPOLITAN AREA CLINIC 1 | Facility: CLINIC | Age: 49
Setting detail: OPHTHALMOLOGY
End: 2024-04-08
Payer: COMMERCIAL

## 2024-04-08 DIAGNOSIS — E11.3412: ICD-10-CM

## 2024-04-08 PROCEDURE — C9257 BEVACIZUMAB INJECTION: HCPCS | Performed by: OPHTHALMOLOGY

## 2024-04-08 PROCEDURE — 67028 INJECTION EYE DRUG: CPT | Mod: LT | Performed by: OPHTHALMOLOGY

## 2024-07-19 ENCOUNTER — TELEPHONE (OUTPATIENT)
Dept: OTHER | Facility: HOSPITAL | Age: 49
End: 2024-07-19

## 2024-07-19 NOTE — TELEPHONE ENCOUNTER
[facility] called requesting medications for this patient, but he has not been seen in our network at a family practice or internal medicine. Advised to contact patient and see where he was seen last in [facility] and contact that practice.

## (undated) DEVICE — SUT ETHILON 2-0 FSLX 30 IN 1674H

## (undated) DEVICE — INTENDED FOR TISSUE SEPARATION, AND OTHER PROCEDURES THAT REQUIRE A SHARP SURGICAL BLADE TO PUNCTURE OR CUT.: Brand: BARD-PARKER SAFETY BLADES SIZE 15, STERILE

## (undated) DEVICE — ACE WRAP 3 IN STERILE

## (undated) DEVICE — PAD GROUNDING ADULT

## (undated) DEVICE — GLOVE INDICATOR PI UNDERGLOVE SZ 8.5 BLUE

## (undated) DEVICE — OCCLUSIVE GAUZE STRIP,3% BISMUTH TRIBROMOPHENATE IN PETROLATUM BLEND: Brand: XEROFORM

## (undated) DEVICE — CHLORAPREP HI-LITE 26ML ORANGE

## (undated) DEVICE — GLOVE SRG BIOGEL 8

## (undated) DEVICE — CATH FOLEY COUDE HEMATURIA 24FR 30ML 3 WAY LUBRICATH

## (undated) DEVICE — PACK TUR

## (undated) DEVICE — Device: Brand: OLYMPUS

## (undated) DEVICE — EVACUATOR BLADDER ELLIK DISP STRL

## (undated) DEVICE — CYSTO TUBING SINGLE IRRIGATION

## (undated) DEVICE — NEEDLE 25G X 1 1/2

## (undated) DEVICE — 3000CC GUARDIAN II: Brand: GUARDIAN

## (undated) DEVICE — BULB SYRINGE,IRRIGATION WITH PROTECTIVE CAP: Brand: DOVER

## (undated) DEVICE — SPECIMEN CONTAINER STERILE PEEL PACK

## (undated) DEVICE — INTENDED FOR TISSUE SEPARATION, AND OTHER PROCEDURES THAT REQUIRE A SHARP SURGICAL BLADE TO PUNCTURE OR CUT.: Brand: BARD-PARKER SAFETY BLADES SIZE 10, STERILE

## (undated) DEVICE — STERILE BETHLEHEM PLASTIC HAND: Brand: CARDINAL HEALTH

## (undated) DEVICE — STRETCH BANDAGE: Brand: CURITY

## (undated) DEVICE — SPONGE PVP SCRUB WING STERILE

## (undated) DEVICE — CHLORHEXIDINE 4PCT 4 OZ

## (undated) DEVICE — PREMIUM DRY TRAY LF: Brand: MEDLINE INDUSTRIES, INC.

## (undated) DEVICE — TUBING SUCTION 5MM X 12 FT

## (undated) DEVICE — PENCIL ELECTROSURG E-Z CLEAN -0035H

## (undated) DEVICE — PADDING CAST 3IN COTTON STRL

## (undated) DEVICE — BAG URINE DRAINAGE 4000ML CONTINUOUS IRR

## (undated) DEVICE — GAUZE SPONGES,16 PLY: Brand: CURITY

## (undated) DEVICE — CYSTOSCOPY PACK: Brand: CONVERTORS

## (undated) DEVICE — BASIC SINGLE BASIN 2-LF: Brand: MEDLINE INDUSTRIES, INC.

## (undated) DEVICE — U-DRAPE: Brand: CONVERTORS